# Patient Record
Sex: FEMALE | Race: WHITE | NOT HISPANIC OR LATINO | Employment: OTHER | URBAN - METROPOLITAN AREA
[De-identification: names, ages, dates, MRNs, and addresses within clinical notes are randomized per-mention and may not be internally consistent; named-entity substitution may affect disease eponyms.]

---

## 2017-12-09 ENCOUNTER — OFFICE VISIT - HEALTHEAST (OUTPATIENT)
Dept: FAMILY MEDICINE | Facility: CLINIC | Age: 67
End: 2017-12-09

## 2017-12-09 DIAGNOSIS — J01.90 ACUTE SINUSITIS: ICD-10-CM

## 2017-12-09 DIAGNOSIS — J20.9 ACUTE BRONCHITIS: ICD-10-CM

## 2017-12-20 ENCOUNTER — RECORDS - HEALTHEAST (OUTPATIENT)
Dept: ADMINISTRATIVE | Facility: OTHER | Age: 67
End: 2017-12-20

## 2018-11-08 ENCOUNTER — OFFICE VISIT - HEALTHEAST (OUTPATIENT)
Dept: FAMILY MEDICINE | Facility: CLINIC | Age: 68
End: 2018-11-08

## 2018-11-08 ENCOUNTER — RECORDS - HEALTHEAST (OUTPATIENT)
Dept: GENERAL RADIOLOGY | Facility: CLINIC | Age: 68
End: 2018-11-08

## 2018-11-08 DIAGNOSIS — J20.9 ACUTE BRONCHITIS, UNSPECIFIED: ICD-10-CM

## 2018-11-08 DIAGNOSIS — J32.9 SINUSITIS: ICD-10-CM

## 2018-11-08 DIAGNOSIS — J20.9 ACUTE BRONCHITIS: ICD-10-CM

## 2018-11-20 ENCOUNTER — AMBULATORY - HEALTHEAST (OUTPATIENT)
Dept: FAMILY MEDICINE | Facility: CLINIC | Age: 68
End: 2018-11-20

## 2018-11-20 ENCOUNTER — AMBULATORY - HEALTHEAST (OUTPATIENT)
Dept: NURSING | Facility: CLINIC | Age: 68
End: 2018-11-20

## 2018-11-20 DIAGNOSIS — Z23 NEED FOR VACCINATION: ICD-10-CM

## 2018-12-04 ENCOUNTER — OFFICE VISIT - HEALTHEAST (OUTPATIENT)
Dept: FAMILY MEDICINE | Facility: CLINIC | Age: 68
End: 2018-12-04

## 2018-12-04 DIAGNOSIS — R19.7 DIARRHEA, UNSPECIFIED TYPE: ICD-10-CM

## 2018-12-04 DIAGNOSIS — Z12.31 VISIT FOR SCREENING MAMMOGRAM: ICD-10-CM

## 2018-12-04 RX ORDER — CLOTRIMAZOLE AND BETAMETHASONE DIPROPIONATE 10; .64 MG/G; MG/G
CREAM TOPICAL
Qty: 15 G | Refills: 1 | Status: SHIPPED | OUTPATIENT
Start: 2018-12-04 | End: 2023-03-08

## 2018-12-05 ENCOUNTER — AMBULATORY - HEALTHEAST (OUTPATIENT)
Dept: LAB | Facility: CLINIC | Age: 68
End: 2018-12-05

## 2018-12-05 DIAGNOSIS — R19.7 DIARRHEA, UNSPECIFIED TYPE: ICD-10-CM

## 2018-12-05 LAB
C DIFF TOX B STL QL: POSITIVE
RIBOTYPE 027/NAP1/BI: ABNORMAL

## 2018-12-06 ENCOUNTER — COMMUNICATION - HEALTHEAST (OUTPATIENT)
Dept: FAMILY MEDICINE | Facility: CLINIC | Age: 68
End: 2018-12-06

## 2018-12-06 LAB
O+P STL MICRO: NORMAL
SHIGA TOXIN 1: NEGATIVE
SHIGA TOXIN 2: NEGATIVE

## 2018-12-08 LAB — BACTERIA SPEC CULT: NORMAL

## 2018-12-24 ENCOUNTER — HOSPITAL ENCOUNTER (OUTPATIENT)
Dept: MAMMOGRAPHY | Facility: CLINIC | Age: 68
Discharge: HOME | End: 2018-12-24
Attending: FAMILY MEDICINE

## 2018-12-24 DIAGNOSIS — Z12.31 VISIT FOR SCREENING MAMMOGRAM: ICD-10-CM

## 2019-03-04 ENCOUNTER — OFFICE VISIT - HEALTHEAST (OUTPATIENT)
Dept: FAMILY MEDICINE | Facility: CLINIC | Age: 69
End: 2019-03-04

## 2019-03-04 ENCOUNTER — HOSPITAL ENCOUNTER (OUTPATIENT)
Dept: LAB | Age: 69
Setting detail: SPECIMEN
Discharge: HOME | End: 2019-03-04

## 2019-03-04 DIAGNOSIS — Z01.818 PREOP GENERAL PHYSICAL EXAM: ICD-10-CM

## 2019-03-04 DIAGNOSIS — Z00.00 MEDICARE ANNUAL WELLNESS VISIT, INITIAL: ICD-10-CM

## 2019-03-04 DIAGNOSIS — Z80.3 FAMILY HISTORY OF BREAST CANCER: ICD-10-CM

## 2019-03-04 DIAGNOSIS — H26.9 CATARACT, UNSPECIFIED CATARACT TYPE, UNSPECIFIED LATERALITY: ICD-10-CM

## 2019-03-04 DIAGNOSIS — R73.01 IMPAIRED FASTING GLUCOSE: ICD-10-CM

## 2019-03-04 LAB
CHOLEST SERPL-MCNC: 231 MG/DL
FASTING STATUS PATIENT QL REPORTED: YES
FASTING STATUS PATIENT QL REPORTED: YES
GLUCOSE BLD-MCNC: 102 MG/DL (ref 70–125)
HBA1C MFR BLD: 5.6 % (ref 3.5–6)
HDLC SERPL-MCNC: 68 MG/DL
LDLC SERPL CALC-MCNC: 144 MG/DL
TRIGL SERPL-MCNC: 96 MG/DL

## 2019-03-04 ASSESSMENT — MIFFLIN-ST. JEOR: SCORE: 1340.08

## 2019-12-05 ENCOUNTER — AMBULATORY - HEALTHEAST (OUTPATIENT)
Dept: NURSING | Facility: CLINIC | Age: 69
End: 2019-12-05

## 2019-12-05 DIAGNOSIS — Z23 NEEDS FLU SHOT: ICD-10-CM

## 2021-05-31 VITALS — WEIGHT: 196 LBS | BODY MASS INDEX: 33.64 KG/M2

## 2021-06-02 VITALS — WEIGHT: 183 LBS | BODY MASS INDEX: 31.24 KG/M2 | HEIGHT: 64 IN

## 2021-06-02 VITALS — BODY MASS INDEX: 31.07 KG/M2 | WEIGHT: 181 LBS

## 2021-06-02 VITALS — WEIGHT: 187 LBS | BODY MASS INDEX: 32.1 KG/M2

## 2021-06-17 NOTE — PATIENT INSTRUCTIONS - HE
Patient Instructions by Sammie Pérez MD at 3/4/2019 12:50 PM     Author: Sammie Pérez MD Service: -- Author Type: Physician    Filed: 3/4/2019  1:20 PM Encounter Date: 3/4/2019 Status: Signed    : Sammie Pérez MD (Physician)         Patient Education   Understanding USDA MyPlate  The USDA (US Department of Agriculture) has guidelines to help you make healthy food choices. These are called MyPlate. MyPlate shows the food groups that make up healthy meals using the image of a place setting. Before you eat, think about the healthiest choices for what to put onto your plate or into your cup or bowl. To learn more about building a healthy plate, visit www.chooseCrossbordersplate.gov.       The Food Groups    Fruits: Any fruit or 100% fruit juice counts as part of the Fruit Group. Fruits may be fresh, canned, frozen, or dried, and may be whole, cut-up, or pureed. Make half your plate fruits and vegetables.    Vegetables: Any vegetable or 100% vegetable juice counts as a member of the Vegetable Group. Vegetables may be fresh, frozen, canned, or dried. They can be served raw or cooked and may be whole, cut-up, or mashed. Make half your plate fruits and vegetables.     Grains: All foods made from grains are part of the Grains Group. These include wheat, rice, oats, cornmeal, and barley such as bread, pasta, oatmeal, cereal, tortillas, and grits. Grains should be no more than a quarter of your plate. At least half of your grains should be whole grains.    Protein: This group includes meat, poultry, seafood, beans and peas, eggs, processed soy products (like tofu), nuts (including nut butters), and seeds. Make protein choices no more than a quarter of your plate. Meat and poultry choices should be lean or low fat.    Dairy: All fluid milk products and foods made from milk that contain calcium, like yogurt and cheese are part of the Dairy Group. (Foods that have little calcium, such as cream, butter, and cream  cheese, are not part of the group.) Most dairy choices should be low-fat or fat-free.    Oils: These are fats that are liquid at room temperature. They include canola, corn, olive, soybean, and sunflower oil. Foods that are mainly oil include mayonnaise, certain salad dressings, and soft margarines. You should have only 5 to 7 teaspoons of oils a day. You probably already get this much from the food you eat.  Use [employer] to Help Build Your Meals  The [employer] can help you plan and track your meals and activity. You can look up individual foods to see or compare their nutritional value. You can get guidelines for what and how much you should eat. You can compare your food choices. And you can assess personal physical activities and see ways you can improve. Go to www.wufoo.gov/[employer]/.    6043-8749 [employer]. [address] [place] [address]. All rights reserved. This information is not intended as a substitute for professional medical care. Always follow your healthcare professional's instructions.             Advance Directive  Patients advance directive was discussed and I am comfortable with the patients wishes.  Patient Education   Personalized Prevention Plan  You are due for the preventive services outlined below.  Your care team is available to assist you in scheduling these services.  If you have already completed any of these items, please share that information with your care team to update in your medical record.  Health Maintenance   Topic Date Due   ? ADVANCE DIRECTIVES DISCUSSED WITH PATIENT  07/28/1968   ? ZOSTER VACCINES (2 of 3) 04/09/2015   ? DXA SCAN  07/28/2016   ? PNEUMOCOCCAL POLYSACCHARIDE VACCINE AGE 65 AND OVER  07/28/2016   ? FALL RISK ASSESSMENT  12/04/2019   ? MAMMOGRAM  12/24/2020   ? TD 18+ HE  02/12/2025   ? COLONOSCOPY  04/14/2025   ? INFLUENZA VACCINE RULE BASED  Completed   ? PNEUMOCOCCAL CONJUGATE VACCINE FOR ADULTS (PCV13 OR PREVNAR)   Completed

## 2021-06-21 NOTE — PROGRESS NOTES
Assessment:     1. Acute bronchitis  XR Chest 2 Views    ipratropium-albuterol 0.5-2.5 mg/3 mL nebulizer solution 3 mL (DUO-NEB)    benzonatate (TESSALON) 200 MG capsule    albuterol (PROAIR HFA;PROVENTIL HFA;VENTOLIN HFA) 90 mcg/actuation inhaler   2. Sinusitis  amoxicillin-clavulanate (AUGMENTIN) 875-125 mg per tablet     Xr Chest 2 Views    Result Date: 11/8/2018  XR CHEST 2 VIEWS 11/8/2018 3:40 PM INDICATION: Cough x 2 weeks, r/o pneumonia. COMPARISON: None. FINDINGS: Negative chest. Subtle scoliosis.        Plan:     Differential diagnosis include but not limited to acute bronchitis, bacterial bronchitis, sinusitis.  X-ray was done to rule out pneumonia, negative chest x-ray, no infiltration noted.  I personally reviewed the x-ray.  Discussed with patient that we will treat her for sinusitis with Augmentin twice daily times 10 days.  For the acute bronchitis we will treat with DuoNeb in the clinic.  Lung sounds clear after the DuoNeb.  Will also use albuterol inhaler every 6 hours as needed for cough or wheezing.  Will also add Tessalon Perles to the treatment plan.  Patient is aware to take ibuprofen or Tylenol for pain or discomfort.  Monitor for worsening symptoms.  May follow-up with PCP if her symptoms does not resolve after treatment.  Patient verbalized understanding the plan of care.    Subjective:       68 y.o. female presents for evaluation of a cough times 2 weeks.  Patient reports that initially it was all in her chest now she feels like it seen in the sinus area.  She admits to feeling like her ears are stuffed and filled with pressure.  She admits to pain in the maxillary sinus area.  She has been using Robitussin 2-3 times a day for a few days which has not given her any symptom relief.  She is using an inhaler that she was prescribed last year for similar symptoms which gives her some minimal relief.  She denies a fever, no nausea, vomiting, diarrhea, shortness of breath.  She admits to recently  being exposed, her  is also here with similar symptoms.  She also has had recent air travel within the past 3 weeks.    The following portions of the patient's history were reviewed and updated as appropriate: allergies, current medications, past family history, past medical history, past social history, past surgical history and problem list.    Review of Systems  A 12 point comprehensive review of systems was negative except as noted.     Objective:      /84  Pulse 92  Temp 98  F (36.7  C) (Oral)   Resp 18  Wt 187 lb (84.8 kg)  SpO2 97%  BMI 32.1 kg/m2  General appearance: alert, appears stated age, cooperative and moderate distress  Head: Normocephalic, without obvious abnormality, atraumatic, sinuses tender to percussion  Eyes: conjunctivae/corneas clear. PERRL, EOM's intact. Fundi benign.  Ears: abnormal TM right ear - erythematous, bulging and air-fluid level and abnormal TM left ear - erythematous, bulging and air-fluid level  Nose: Nares normal. Septum midline. Mucosa normal. No drainage or sinus tenderness., moderate congestion, sinus tenderness bilateral, moderate maxillary sinus tenderness bilateral  Throat: abnormal findings: marked oropharyngeal erythema  Lungs: rhonchi bilaterally  Heart: regular rate and rhythm, S1, S2 normal, no murmur, click, rub or gallop  Extremities: extremities normal, atraumatic, no cyanosis or edema  Pulses: 2+ and symmetric  Skin: Skin color, texture, turgor normal. No rashes or lesions  Lymph nodes: Cervical, supraclavicular, and axillary nodes normal.  Neurologic: Grossly normal     This note has been dictated using voice recognition software. Any grammatical or context distortions are unintentional and inherent to the software

## 2021-06-22 NOTE — PROGRESS NOTES
ASSESSMENT/PLAN:  1. Diarrhea, unspecified type  Diarrhea with blood and mucus after taking a recent course of antibiotics.  She was also [facility] friend onto a history of C. difficile.  We will check C. difficile as well as stool cultures.  Recommend she avoid Imodium until we have the C. difficile back.  Encouraged her to start probiotics.  Further recommendations pending stool cultures.  - C. difficile Toxigenic by PCR; Future  - Culture, Stool; Future  - Ova and Parasite, Stool; Future    2. Visit for screening mammogram  - Mammo Screening Bilateral; Future      Patient Instructions   Start taking probiotics daily.     Complete the stool kits and return them to our clinic at your earliest convenience.    Avoid using Imodium until we get the stool cultures back.     Try to use nasal saline rinses and Flonase nasal spray to improve your sinus symptoms.       Orders Placed This Encounter   Procedures     C. difficile Toxigenic by PCR     Standing Status:   Future     Standing Expiration Date:   12/4/2019     Culture, Stool     Standing Status:   Future     Standing Expiration Date:   12/4/2019     Ova and Parasite, Stool     Standing Status:   Future     Standing Expiration Date:   12/4/2019     Mammo Screening Bilateral     Standing Status:   Future     Standing Expiration Date:   3/4/2020     Order Specific Question:   Patient's previous breast density:     Answer:   Scattered fibroglandular density [2]     Order Specific Question:   Can the procedure be changed per Radiologist protocol?     Answer:   Yes     Medications Discontinued During This Encounter   Medication Reason     albuterol (PROAIR HFA;PROVENTIL HFA;VENTOLIN HFA) 90 mcg/actuation inhaler Therapy completed     calcium carbonate-vitamin D2 500 mg(1,250mg) -200 unit tablet Therapy completed       Return in about 3 months (around 3/4/2019) for annual physical with PCP.    CHIEF COMPLAINT;  Chief Complaint   Patient presents with     Diarrhea      since start of abx, check ears and spot on leg       HISTORY OF PRESENT ILLNESS:  Nia is a 68 y.o. female presenting to the clinic today for diarrhea. She was in [place] three times in a 2 month time period. Her most recent trip was in October and she did return 10/25/18. Her  notes that they got pretty ill after returning from [place]. Her and her  both had acute bronchitis about 10-14 days after returning from their trip. She was put on amoxicillin, tessalon Perles, and an inhaler. She has previously taken amoxicillin and has not had any issues with it. She developed diarrhea 3 days after starting the antibiotics. She is going to the bathroom upwards of 8 times per day. She endorses cramping and chills when she has a bowel movement. She did try Imodium last week for 2 days, which did help improve the diarrhea, however the diarrhea did return. She denies nausea, vomiting, or hematochezia. She does note that she will pass mucous after she has had 7-8 episodes of diarrhea in one day. When she does pass mucous, there will be a little blood tinge present. She is trying to stay hydrated. She notes that whenever she eats something, she will have diarrhea almost immediately. She did visit a nursing home while she was in [place].     REVIEW OF SYSTEMS:  HEENT positive for sinus pressure. Skin is positive for erythematous area on upper right leg. All other systems are negative.    PFSH:  Her aunt passed away recently. Reviewed, as below.    TOBACCO USE:  Social History     Tobacco Use   Smoking Status Never Smoker   Smokeless Tobacco Never Used       VITALS:  Vitals:    12/04/18 1333   BP: 110/78   Pulse: 72   Resp: 20   Temp: 98  F (36.7  C)   Weight: 181 lb (82.1 kg)     Wt Readings from Last 3 Encounters:   12/04/18 181 lb (82.1 kg)   11/08/18 187 lb (84.8 kg)   12/09/17 196 lb (88.9 kg)     Body mass index is 31.07 kg/m .    PHYSICAL EXAM:  GENERAL APPEARANCE: Alert, cooperative, no  distress, appears stated age  HEAD: Normocephalic, without obvious abnormality, atraumatic  LUNGS: Clear to auscultation bilaterally, respirations unlabored  HEART: Regular rate and rhythm, S1 and S2 normal, no murmur, rub or gallop  ABDOMEN: Soft, non-tender, bowel sounds active all four quadrants,     no masses, no organomegaly  SKIN: 2 cm round scaling erythematous lesion with raised edges on mid right upper thigh  NEUROLOGIC: CNII-XII intact.     RECENT RESULTS  No results found for this or any previous visit (from the past 48 hour(s)).    ADDITIONAL HISTORY SUMMARIZED (2): None.  DECISION TO OBTAIN EXTRA INFORMATION (1): None.  RADIOLOGY TESTS (1): Mammogram ordered.  LABS (1): Labs ordered today.  MEDICINE TESTS (1): None.  INDEPENDENT REVIEW (2 each): None.    The visit lasted a total of 17 minutes face to face with the patient. Over 50% of the time was spent counseling and educating the patient about diarrhea.    ICass, am scribing for and in the presence of, Dr. Pabon.    I, Dr. Pabon, personally performed the services described in this documentation, as scribed by Cass Liu in my presence, and it is both accurate and complete.    Dragon dictation was used for this note.  Speech recognition errors are a possibility.    MEDICATIONS:  Current Outpatient Medications   Medication Sig Dispense Refill     clotrimazole-betamethasone (LOTRISONE) cream Apply to affected area 3 times daily. 15 g 1     No current facility-administered medications for this visit.        Total data points: 2

## 2021-06-24 NOTE — PROGRESS NOTES
Assessment and Plan:     1. Medicare annual wellness visit, initial  At today's visit, we discussed lifestyle interventions to promote self-management and wellness, including maintenance of a healthy weight, healthy diet, regular physical activity and exercise, and falls prevention.  We will obtain fasting lipids today.  Up-to-date with routine cancer screening, recommend mammogram yearly.  Encouraged her to forward as her advanced healthcare directive.  She will be due for PPS V 23 in November.  Consider Shingrix.  - Lipid Otoe FASTING    2. Impaired fasting glucose  Encourage healthy lifestyle habits.  We will check fasting glucose and fasting A1c today.  - Glucose  - Glycosylated Hemoglobin A1c    3. Family history of breast cancer  She would like to consider seeing a genetic counselor for consideration of BRCA gene testing.  She will let me know if she desires referral.    4. Preop general physical exam  No significant surgical cultures or contraindications identified.  She may proceed with cataract surgery as scheduled without further clinical clarification or evaluation and may proceed with choice of anesthesia.    5. Cataract, unspecified cataract type, unspecified laterality  Proceed with surgical treatment.    The patient's current medical problems were reviewed.    The following health maintenance schedule was reviewed with the patient and provided in printed form in the after visit summary:   Health Maintenance   Topic Date Due     ADVANCE DIRECTIVES DISCUSSED WITH PATIENT  07/28/1969     ZOSTER VACCINES (2 of 3) 04/09/2015     DXA SCAN  07/28/2016     PNEUMOCOCCAL POLYSACCHARIDE VACCINE AGE 65 AND OVER  07/28/2016     FALL RISK ASSESSMENT  12/04/2019     MAMMOGRAM  12/24/2020     TD 18+ HE  02/12/2025     COLONOSCOPY  04/14/2025     INFLUENZA VACCINE RULE BASED  Completed     PNEUMOCOCCAL CONJUGATE VACCINE FOR ADULTS (PCV13 OR PREVNAR)  Completed        Subjective:   Chief Complaint: Nia REEVES  Conner is an 68 y.o. female here for an Annual Wellness visit.   HPI: She is been doing quite well.  She will be needing cataract surgery later this month.  She is fasting today for follow-up of impaired fasting glucose.  She has been struggling to exercise this winter due to the weather and fear of falling when exercising outside by walking.  Overall feeling she is doing okay with diet but needing to increase her fruits and vegetables.  Unfortunately struggled with bronchitis earlier in the fall, followed by C. difficile colitis.  That has resolved.  Has had some intermittent ear plugging since the bronchitis, waxing and waning.  No family history of breast cancer in her paternal grandmother in her late 60s or 70s and recently in paternal aunt in her 90s.  Patient is wondering if she could have a breast cancer gene and therefore may qualify for increased surveillance or potentially even prophylactic mastectomy, interested in potentially pursuing this further.     Patient has had worsening of her cataract, requiring surgical treatment.  She has no previous history of anesthesia reactions or bleeding or clotting disorders and has no family history of same.  She would be agreeable to blood transfusion should one be required the surgery is incredibly real low risk for this.    Review of Systems:    Please see above.  The rest of the review of systems are negative for all systems.    Patient Care Team:  Sammie Pérez MD as PCP - General (Family Medicine)     Patient Active Problem List   Diagnosis     Impaired fasting glucose     Cataract     No past medical history on file.   No past surgical history on file.   Family History   Problem Relation Age of Onset     Breast cancer Paternal Grandmother      Cancer Mother      Kidney cancer Mother      Hypertension Father      Anxiety disorder Sister      Anxiety disorder Brother       Social History     Socioeconomic History     Marital status:      Spouse name:  "Not on file     Number of children: Not on file     Years of education: Not on file     Highest education level: Not on file   Occupational History     Not on file   Social Needs     Financial resource strain: Not on file     Food insecurity:     Worry: Not on file     Inability: Not on file     Transportation needs:     Medical: Not on file     Non-medical: Not on file   Tobacco Use     Smoking status: Never Smoker     Smokeless tobacco: Never Used   Substance and Sexual Activity     Alcohol use: Not on file     Drug use: Not on file     Sexual activity: Not on file   Lifestyle     Physical activity:     Days per week: Not on file     Minutes per session: Not on file     Stress: Not on file   Relationships     Social connections:     Talks on phone: Not on file     Gets together: Not on file     Attends Buddhism service: Not on file     Active member of club or organization: Not on file     Attends meetings of clubs or organizations: Not on file     Relationship status: Not on file     Intimate partner violence:     Fear of current or ex partner: Not on file     Emotionally abused: Not on file     Physically abused: Not on file     Forced sexual activity: Not on file   Other Topics Concern     Not on file   Social History Narrative     Not on file      Current Outpatient Medications   Medication Sig Dispense Refill     clotrimazole-betamethasone (LOTRISONE) cream Apply to affected area 3 times daily. 15 g 1     L. rhamnosus GG/inulin (CULTURELLE PROBIOTICS ORAL) Take by mouth.       No current facility-administered medications for this visit.       Objective:   Vital Signs:   Visit Vitals  /84   Pulse 88   Temp 97.9  F (36.6  C) (Oral)   Resp 20   Ht 5' 4\" (1.626 m)   Wt 183 lb (83 kg)   LMP 12/24/1999   SpO2 99%   BMI 31.41 kg/m         VisionScreening:  No exam data present     PHYSICAL EXAM  Physical Examination: General appearance - alert, well appearing, and in no distress, oriented to person, place, " and time and normal appearing weight  Mental status - alert, oriented to person, place, and time, normal mood, behavior, speech, dress, motor activity, and thought processes  Eyes - pupils equal and reactive, extraocular eye movements intact  Ears - bilateral TM's and external ear canals normal  Nose - normal and patent, no erythema, discharge or polyps  Mouth - mucous membranes moist, pharynx normal without lesions  Neck - supple, no significant adenopathy  Lymphatics - no palpable lymphadenopathy, no hepatosplenomegaly  Chest - clear to auscultation, no wheezes, rales or rhonchi, symmetric air entry  Heart - normal rate, regular rhythm, normal S1, S2, no murmurs, rubs, clicks or gallops  Abdomen - soft, nontender, nondistended, no masses or organomegaly  Breasts - breasts appear normal, no suspicious masses, no skin or nipple changes or axillary nodes  Neurological - alert, oriented, normal speech, no focal findings or movement disorder noted  Musculoskeletal - no joint tenderness, deformity or swelling  Extremities - peripheral pulses normal, no pedal edema, no clubbing or cyanosis  Skin - normal coloration and turgor, no rashes, no suspicious skin lesions noted      Assessment Results 3/4/2019   Activities of Daily Living No help needed   Instrumental Activities of Daily Living No help needed   Mini Cog Total Score 5   Some recent data might be hidden     A Mini-Cog score of 0-2 suggests the possibility of dementia, score of 3-5 suggests no dementia    Identified Health Risks:     The patient was counseled and encouraged to consider modifying their diet and eating habits. She was provided with information on recommended healthy diet options.  Patient's advanced directive was discussed and I am comfortable with the patient's wishes.

## 2021-06-24 NOTE — PROGRESS NOTES
Your fasting blood sugar is elevated into the prediabetes range.  This means that your body is beginning to have difficulty in managing sugar, but you do not yet have diabetes.  Your A1c, which measures your fasting blood sugar over the last 3 months, remains in the normal range.  Regular exercise, healthy eating (lots of fresh veggies and fruits, more lean meats and protein, and fewer sweets, baked goods, and bread products), and maintenance of a healthy weight can improve your blood sugar and reduce your risk of developing diabetes in the future.  We will follow these tests yearly.

## 2021-06-25 NOTE — PROGRESS NOTES
Progress Notes by Lin Li MD at 12/9/2017  1:50 PM     Author: Lin Li MD Service: -- Author Type: Physician    Filed: 12/9/2017  2:53 PM Encounter Date: 12/9/2017 Status: Signed    : Lin Li MD (Physician)       Provider wore a mask during this visit.   Subjective:   Nia Prieto is a 67 y.o. female  Roomed by: Heather S    Accompanied by Spouse    Refills needed? No    Do you have any forms that need to be filled out? No      Chief Complaint   Patient presents with   ? Cough     Cold/cough x 12 days    Started out in the chest and has moved to the nose and ears. Denies recent fever, CP, SOB. But admits a little frontal headache and sense of smell is down. Admits fatigue, and appetite has been down. Denies nausea, vomiting, diarrhea or belly pain. Admits congestion in her chest. Has tried Robittussin, Airborne, and advil. Says her breathing feels tight.   Review of Systems  Const - Resp - see HPI  No Known Allergies    Current Outpatient Prescriptions:   ?  calcium carbonate-vitamin D2 500 mg(1,250mg) -200 unit tablet, Take 1 tablet by mouth 2 (two) times a day., Disp: , Rfl:   Patient Active Problem List   Diagnosis   ? Impaired fasting glucose   ? Cataract     Medical History Reviewed  Objective:     Vitals:    12/09/17 1356   BP: 128/80   Patient Site: Right Arm   Patient Position: Sitting   Cuff Size: Adult Regular   Pulse: 90   Resp: 18   Temp: 98  F (36.7  C)   TempSrc: Oral   SpO2: 96%   Weight: 196 lb (88.9 kg)   Gen - Pt in NAD  Eyes - Conjunctiva non injected, no drainage  Face - non TTP over frontal sinus areas; non TTP over maxillary area  Ears - external canals - no induration, Right TM - not injected, Left TM - not injected   Nose - not congested, no nasal drainage  Pharynx - non injected, tonsils 1+ size  Neck - supple, no cervical adenopathy, no masses  Cor - RRR w/o murmur  Lungs - Fair -good air entry; diffuse expiratory wheezes, but no crackles noted on  auscultation - coarse  coughing noted on request  Skin - no lesions, no rashes noted     Assessment - Plan   Medical Decision Making -67-year-old woman presents with 12 days of URI symptoms that have gotten not improved.  Also admits some shortness of breath and chest slight chest tightness.  On exam patient did have some diffuse expiratory wheezes and coarse coughing.  After the albuterol nebulizer she said she could breathe a little better.  We will treat for an acute sinusitis because of patient's prolonged symptoms of a URI and albuterol inhaler with spacer for acute bronchitis.    1. Acute sinusitis  - amoxicillin-clavulanate (AUGMENTIN) 875-125 mg per tablet; Take 1 tablet by mouth 2 (two) times a day for 7 days.  Dispense: 14 tablet; Refill: 0    2. Acute bronchitis  - albuterol nebulizer solution 2.5 mg (PROVENTIL); Take 3 mL (2.5 mg total) by nebulization once.  - albuterol (PROAIR HFA;PROVENTIL HFA;VENTOLIN HFA) 90 mcg/actuation inhaler; Inhale 1-2 puffs every 4 (four) hours as needed for wheezing or shortness of breath (or coughing).  Dispense: 1 Inhaler; Refill: 0  - Spacer W/O Mask    At the conclusion of the encounter, assessment and plan were discussed.   All questions were answered.   The patient or guardian acknowledged understanding and was involved in the decision making regarding the overall care plan.    Patient Instructions     1. Keep well hydrated  2.  May alternate Tylenol every 6 hours with ibuprofen every 6 hours as needed for fever or pain  3. If symptoms not improved after completing antibiotics, follow up with primary  4. If are getting worse after 48 hours of antibiotics or you have any questions, call the clinic number - it's answered 24/7      Acute Bacterial Rhinosinusitis (ABRS)  Acute bacterial rhinosinusitis (ABRS) is an infection of your nasal cavity and sinuses. Its caused by bacteria. Acute means that youve had symptoms for less than 12 weeks.  Understanding your sinuses  The  nasal cavity is the large air-filled space behind your nose. The sinuses are a group of spaces formed by the bones of your face. They connect with your nasal cavity. ABRS causes the tissue lining these spaces to become inflamed. Mucus may not drain normally. This leads to facial pain and other symptoms.  What causes ABRS?  ABRS most often follows an upper respiratory infection caused by a virus. Bacteria then infect the lining of your nasal cavity and sinuses. But you can also get ABRS if you have:    Nasal allergies    Long-term nasal swelling and congestion not caused by allergies    Blockage in the nose  Symptoms of ABRS  The symptoms of ABRS may be different for each person, and can include:    Nasal congestion    Runny nose    Fluid draining from the nose down the throat (postnasal drip)    Headache    Cough    Pain in the sinuses    Thick, colored fluid from the nose (mucus)    Fever  Diagnosing ABRS  ABRS may be diagnosed if youve had an upper respiratory infection like a cold and cough for longer than 10 to 14 days. Your health care provider will ask about your symptoms and your medical history. The provider will check your vital signs, including your temperature. Youll have a physical exam. The health care provider will check your ears, nose, and throat. You likely wont need any tests. If ABRS comes back, you may have a culture or other tests.  Treatment for ABRS  Treatment may include:    Antibiotic medicine. This is for symptoms that last for at least 10 to 14 days.    Nasal corticosteroid medicine. Drops or spray used in the nose can lessen swelling and congestion.    Over-the-counter pain medicine. This is to lessen sinus pain and pressure.    Nasal decongestant medicine. Spray or drops may help to lessen congestion. Do not use them for more than a few days.    Salt wash (saline irrigation). This can help to loosen mucus.  Possible complications of ABRS  ABRS may come back or become long-term  (chronic).  In rare cases, ABRS may cause complications such as:     Inflamed tissue around the brain and spinal cord (meningitis)    Inflamed tissue around the eyes (orbital cellulitis)    Inflamed bones around the sinuses (osteitis)  These problems may need to be treated in a hospital with intravenous (IV) antibiotic medicine or surgery.  When to call the health care provider  Call your health care provider if you have any of the following:    Symptoms that dont get better, or get worse    Symptoms that dont get better after 3 to 5 days on antibiotics    Trouble seeing    Swelling around your eyes    Confusion or trouble staying awake   Date Last Reviewed: 3/3/2015    1975-6263 "[employer]. [address] [place] [address], [place] [address]. All rights reserved. This information is not intended as a substitute for professional medical care. Always follow your healthcare professional's instructions.      What Is Acute Bronchitis?  Acute or short-term bronchitis last for days or weeks. It occurs when the bronchial tubes (airways in the lungs) are irritated by a virus, bacteria, or allergen. This causes a cough that produces yellow or greenish mucus.  Inside healthy lungs    Air travels in and out of the lungs through the airways. The linings of these airways produce sticky mucus. This mucus traps particles that enter the lungs. Tiny structures called cilia then sweep the particles out of the airways.     Healthy airway: Airways are normally open. Air moves in and out easily.      Healthy cilia: Tiny, hairlike cilia sweep mucus and particles up and out of the airways.   Lungs with bronchitis  Bronchitis often occurs with a cold or the flu virus. The airways become inflamed (red and swollen). There is a deep hacking cough from the extra mucus. Other symptoms may include:    Wheezing    Chest discomfort    Shortness of breath    Mild fever  A second infection, this time due to bacteria, may then occur. And airways  irritated by allergens or smoke are more likely to get infected.        Inflamed airway: Inflammation and extra mucus narrow the airway, causing shortness of breath.      Impaired cilia: Extra mucus impairs cilia, causing congestion and wheezing. Smoking makes the problem worse.   Making a diagnosis  A physical exam, health history, and certain tests help your healthcare provider make the diagnosis.  Health history  Your healthcare provider will ask you about your symptoms.  The exam  Your provider listens to your chest for signs of congestion. He or she may also check your ears, nose, and throat.  Possible tests    A sputum test for bacteria. This requires a sample of mucus from the lungs.    A nasal or throat swab for bacterial infection.    A chest X-ray if your healthcare provider thinks you have pneumonia.    Tests to check for an underlying condition, such as allergies, asthma, or COPD. You may need to see a specialist for more lung function testing.  Treating a cough  The main treatment for bronchitis is easing symptoms. Avoiding smoke, allergens, and other things that trigger coughing can often help. If the infection is bacterial, you may be given antibiotics. During the illness, it's important to get plenty of sleep. To ease symptoms:    Dont smoke, and avoid secondhand smoke.    Use a humidifier, or breathe in steam from a hot shower. This may help loosen mucus.    Drink a lot of water and juice. They can soothe the throat and may help thin mucus.    Sit up or use extra pillows when in bed to help lessen coughing and congestion.    Ask your provider about using cough medicine, pain and fever medicine, or a decongestant.  Antibiotics  Most cases of bronchitis are caused by cold or flu viruses. Antibiotics dont treat viral illness. Taking antibiotics when they are not needed increases your risk of getting an infection later that is antibiotic-resistant. Your provider will prescribe antibiotics if the  infection is caused by bacteria. If they are prescribed:    Take the medicine until it is used up, even if symptoms have improved. If you dont, the bronchitis may come back.    Take them as directed. For instance, some medicines should be taken with food.    Ask your provider or pharmacist what side effects are common, and what to do about them.  Follow-up care  You should see your provider again in 2 to 3 weeks. By this time, symptoms should have improved. An infection that lasts longer may mean you have a more serious problem.  Prevention    Avoid tobacco smoke. If you smoke, quit. Stay away from smoky places. Ask friends and family not to smoke around you, or in your home or car.    Get checked for allergies.    Ask your provider about getting a yearly flu shot, and pneumococcal or pneumonia shots.    Wash your hands often. This helps reduce the chance of picking up viruses that cause colds and flu.  Call your healthcare provider if:    Symptoms worsen, or new symptoms develop.    Breathing problems worsen or  become severe.    Symptoms dont get better within a week, or within 3 days of taking antibiotics.   Date Last Reviewed: 6/18/2014 2000-2016 [employer] [address] [place] [address], [place] [address]. All rights reserved. This information is not intended as a substitute for professional medical care. Always follow your healthcare professional's instructions.

## 2021-06-26 ENCOUNTER — HEALTH MAINTENANCE LETTER (OUTPATIENT)
Age: 71
End: 2021-06-26

## 2021-10-16 ENCOUNTER — HEALTH MAINTENANCE LETTER (OUTPATIENT)
Age: 71
End: 2021-10-16

## 2022-07-17 ENCOUNTER — HEALTH MAINTENANCE LETTER (OUTPATIENT)
Age: 72
End: 2022-07-17

## 2022-09-25 ENCOUNTER — HEALTH MAINTENANCE LETTER (OUTPATIENT)
Age: 72
End: 2022-09-25

## 2023-01-17 ENCOUNTER — OFFICE VISIT (OUTPATIENT)
Dept: FAMILY MEDICINE | Facility: CLINIC | Age: 73
End: 2023-01-17
Payer: MEDICARE

## 2023-01-17 VITALS
BODY MASS INDEX: 29.52 KG/M2 | WEIGHT: 172 LBS | HEART RATE: 98 BPM | OXYGEN SATURATION: 95 % | DIASTOLIC BLOOD PRESSURE: 100 MMHG | RESPIRATION RATE: 16 BRPM | SYSTOLIC BLOOD PRESSURE: 148 MMHG | TEMPERATURE: 97.3 F

## 2023-01-17 DIAGNOSIS — L85.3 XEROSIS CUTIS: Primary | ICD-10-CM

## 2023-01-17 PROCEDURE — 99203 OFFICE O/P NEW LOW 30 MIN: CPT | Performed by: PHYSICIAN ASSISTANT

## 2023-01-17 RX ORDER — TRIAMCINOLONE ACETONIDE 1 MG/G
CREAM TOPICAL 2 TIMES DAILY
Qty: 45 G | Refills: 1 | Status: SHIPPED | OUTPATIENT
Start: 2023-01-17 | End: 2023-01-31

## 2023-01-17 NOTE — PROGRESS NOTES
Assessment & Plan:      Problem List Items Addressed This Visit    None  Visit Diagnoses     Xerosis cutis    -  Primary    Relevant Medications    triamcinolone (KENALOG) 0.1 % external cream        Medical Decision Making  Patient presents with on and off itchy rash of the waist, arms, and hands bilaterally for 2 weeks.  Symptoms appear consistent with dry skin.  No signs of cellulitis, tinea corporis, or allergic reaction.  Discussed treatment and symptomatic care.  Allergies and medication interactions reviewed.  Discussed signs of worsening symptoms and when to follow-up with PCP if no symptom improvement.     Subjective:      Nia Prieto is a 72 year old female here for evaluation of dry, itchy skin.  Onset of symptoms was 2 weeks ago.  Patient was recently treated with oral amoxicillin for a dental procedure.  Last dose of antibiotics was 10 days ago.  Symptoms started 2 days after patient completed her antibiotics.  Rash initially developed on the lower posterior waist bilaterally.  Patient has also had rash on the hands and arms bilaterally.  Itchiness will come and go.  Itchiness does seem to subside slightly after applying topical moisturizer.  No fevers.  No new soaps, lotions, or detergents.     The following portions of the patient's history were reviewed and updated as appropriate: allergies, current medications, and problem list.     Review of Systems  Pertinent items are noted in HPI.    Allergies  No Known Allergies    Family History   Problem Relation Age of Onset     Breast Cancer Paternal Grandmother      Cancer Mother      Kidney Cancer Mother      Hypertension Father      Anxiety Disorder Sister      Anxiety Disorder Brother        Social History     Tobacco Use     Smoking status: Never     Smokeless tobacco: Never   Substance Use Topics     Alcohol use: Not on file        Objective:      BP (!) 148/100   Pulse 98   Temp 97.3  F (36.3  C) (Oral)   Resp 16   Wt 78 kg (172 lb)    SpO2 95%   BMI 29.52 kg/m    General appearance - alert, well appearing, and in no distress and non-toxic  Skin - Dry/scaled erythematous tissue affecting the posterior arms, posterior waist, and hands bilaterally    The use of Dragon/[employer] dictation services was used to construct the content of this note; any grammatical errors are non-intentional. Please contact the author directly if you are in need of any clarification.

## 2023-03-07 ASSESSMENT — ENCOUNTER SYMPTOMS
BREAST MASS: 0
CONSTIPATION: 0
COUGH: 0
NAUSEA: 0
HEADACHES: 0
JOINT SWELLING: 0
PARESTHESIAS: 0
SHORTNESS OF BREATH: 0
FEVER: 0
ARTHRALGIAS: 0
ABDOMINAL PAIN: 0
FREQUENCY: 0
NERVOUS/ANXIOUS: 0
PALPITATIONS: 0
MYALGIAS: 0
HEARTBURN: 1
WEAKNESS: 0
CHILLS: 0
DYSURIA: 0
DIARRHEA: 0
DIZZINESS: 0
EYE PAIN: 0
HEMATURIA: 0
SORE THROAT: 0
HEMATOCHEZIA: 0

## 2023-03-07 ASSESSMENT — ACTIVITIES OF DAILY LIVING (ADL): CURRENT_FUNCTION: NO ASSISTANCE NEEDED

## 2023-03-09 ENCOUNTER — OFFICE VISIT (OUTPATIENT)
Dept: FAMILY MEDICINE | Facility: CLINIC | Age: 73
End: 2023-03-09
Payer: MEDICARE

## 2023-03-09 VITALS
DIASTOLIC BLOOD PRESSURE: 94 MMHG | BODY MASS INDEX: 29.16 KG/M2 | HEART RATE: 90 BPM | WEIGHT: 170.8 LBS | HEIGHT: 64 IN | SYSTOLIC BLOOD PRESSURE: 140 MMHG | TEMPERATURE: 97.2 F | RESPIRATION RATE: 16 BRPM | OXYGEN SATURATION: 99 %

## 2023-03-09 DIAGNOSIS — Z11.59 NEED FOR HEPATITIS C SCREENING TEST: ICD-10-CM

## 2023-03-09 DIAGNOSIS — Z00.00 MEDICARE ANNUAL WELLNESS VISIT, SUBSEQUENT: Primary | ICD-10-CM

## 2023-03-09 DIAGNOSIS — L50.3 DERMATOGRAPHIA: ICD-10-CM

## 2023-03-09 DIAGNOSIS — R73.01 IMPAIRED FASTING GLUCOSE: ICD-10-CM

## 2023-03-09 DIAGNOSIS — Z78.0 ASYMPTOMATIC POSTMENOPAUSAL STATUS: ICD-10-CM

## 2023-03-09 DIAGNOSIS — L29.9 ITCHING: ICD-10-CM

## 2023-03-09 DIAGNOSIS — Z13.220 LIPID SCREENING: ICD-10-CM

## 2023-03-09 LAB
ALBUMIN SERPL BCG-MCNC: 4.6 G/DL (ref 3.5–5.2)
ALP SERPL-CCNC: 59 U/L (ref 35–104)
ALT SERPL W P-5'-P-CCNC: 7 U/L (ref 10–35)
ANION GAP SERPL CALCULATED.3IONS-SCNC: 14 MMOL/L (ref 7–15)
AST SERPL W P-5'-P-CCNC: 21 U/L (ref 10–35)
BASOPHILS # BLD AUTO: 0 10E3/UL (ref 0–0.2)
BASOPHILS NFR BLD AUTO: 0 %
BILIRUB SERPL-MCNC: 0.3 MG/DL
BUN SERPL-MCNC: 12.3 MG/DL (ref 8–23)
CALCIUM SERPL-MCNC: 9.9 MG/DL (ref 8.8–10.2)
CHLORIDE SERPL-SCNC: 103 MMOL/L (ref 98–107)
CHOLEST SERPL-MCNC: 217 MG/DL
CREAT SERPL-MCNC: 0.81 MG/DL (ref 0.51–0.95)
DEPRECATED HCO3 PLAS-SCNC: 24 MMOL/L (ref 22–29)
EOSINOPHIL # BLD AUTO: 0.1 10E3/UL (ref 0–0.7)
EOSINOPHIL NFR BLD AUTO: 1 %
ERYTHROCYTE [DISTWIDTH] IN BLOOD BY AUTOMATED COUNT: 13.4 % (ref 10–15)
GFR SERPL CREATININE-BSD FRML MDRD: 77 ML/MIN/1.73M2
GLUCOSE SERPL-MCNC: 112 MG/DL (ref 70–99)
HBA1C MFR BLD: 5.5 % (ref 0–5.6)
HCT VFR BLD AUTO: 41.3 % (ref 35–47)
HDLC SERPL-MCNC: 72 MG/DL
HGB BLD-MCNC: 13.9 G/DL (ref 11.7–15.7)
IMM GRANULOCYTES # BLD: 0 10E3/UL
IMM GRANULOCYTES NFR BLD: 0 %
LDLC SERPL CALC-MCNC: 129 MG/DL
LYMPHOCYTES # BLD AUTO: 1.2 10E3/UL (ref 0.8–5.3)
LYMPHOCYTES NFR BLD AUTO: 20 %
MCH RBC QN AUTO: 29.8 PG (ref 26.5–33)
MCHC RBC AUTO-ENTMCNC: 33.7 G/DL (ref 31.5–36.5)
MCV RBC AUTO: 88 FL (ref 78–100)
MONOCYTES # BLD AUTO: 0.4 10E3/UL (ref 0–1.3)
MONOCYTES NFR BLD AUTO: 7 %
NEUTROPHILS # BLD AUTO: 4.5 10E3/UL (ref 1.6–8.3)
NEUTROPHILS NFR BLD AUTO: 72 %
NONHDLC SERPL-MCNC: 145 MG/DL
PLATELET # BLD AUTO: 282 10E3/UL (ref 150–450)
POTASSIUM SERPL-SCNC: 5 MMOL/L (ref 3.4–5.3)
PROT SERPL-MCNC: 7.6 G/DL (ref 6.4–8.3)
RBC # BLD AUTO: 4.67 10E6/UL (ref 3.8–5.2)
SODIUM SERPL-SCNC: 141 MMOL/L (ref 136–145)
TRIGL SERPL-MCNC: 78 MG/DL
WBC # BLD AUTO: 6.3 10E3/UL (ref 4–11)

## 2023-03-09 PROCEDURE — G0439 PPPS, SUBSEQ VISIT: HCPCS | Performed by: FAMILY MEDICINE

## 2023-03-09 PROCEDURE — 83036 HEMOGLOBIN GLYCOSYLATED A1C: CPT | Performed by: FAMILY MEDICINE

## 2023-03-09 PROCEDURE — 36415 COLL VENOUS BLD VENIPUNCTURE: CPT | Performed by: FAMILY MEDICINE

## 2023-03-09 PROCEDURE — 80053 COMPREHEN METABOLIC PANEL: CPT | Performed by: FAMILY MEDICINE

## 2023-03-09 PROCEDURE — 86803 HEPATITIS C AB TEST: CPT | Performed by: FAMILY MEDICINE

## 2023-03-09 PROCEDURE — 99213 OFFICE O/P EST LOW 20 MIN: CPT | Mod: 25 | Performed by: FAMILY MEDICINE

## 2023-03-09 PROCEDURE — 80061 LIPID PANEL: CPT | Performed by: FAMILY MEDICINE

## 2023-03-09 PROCEDURE — 85025 COMPLETE CBC W/AUTO DIFF WBC: CPT | Performed by: FAMILY MEDICINE

## 2023-03-09 ASSESSMENT — ENCOUNTER SYMPTOMS
CONSTIPATION: 0
HEADACHES: 0
DIARRHEA: 0
COUGH: 0
BREAST MASS: 0
DIZZINESS: 0
SHORTNESS OF BREATH: 0
SORE THROAT: 0
FEVER: 0
HEARTBURN: 1
ABDOMINAL PAIN: 0
PALPITATIONS: 0
DYSURIA: 0
WEAKNESS: 0
FREQUENCY: 0
NERVOUS/ANXIOUS: 0
MYALGIAS: 0
CHILLS: 0
HEMATURIA: 0
ARTHRALGIAS: 0
PARESTHESIAS: 0
JOINT SWELLING: 0
EYE PAIN: 0
HEMATOCHEZIA: 0
NAUSEA: 0

## 2023-03-09 ASSESSMENT — PAIN SCALES - GENERAL: PAINLEVEL: NO PAIN (0)

## 2023-03-09 ASSESSMENT — ACTIVITIES OF DAILY LIVING (ADL): CURRENT_FUNCTION: NO ASSISTANCE NEEDED

## 2023-03-09 NOTE — PATIENT INSTRUCTIONS
Begin cetirizine (Zyrtec) 10 mg daily to help with skin itching and dermatographia some.  Continue to use a good lotion regularly.  Check your water softener to ensure it is functioning properly.  Let us know if you are having new symptoms or things are not improving.  Zyrtec may also help with the postnasal drainage you are experiencing.    I do not feel you need further evaluation of the episode of syncope (fainting) that you experienced in September, but be sure to let us know if you develop additional symptoms.    I recommend that you receive the pneumonia shot, PCV 20.  Check your insurance coverage as this is often best covered at a pharmacy.      Patient Education   Personalized Prevention Plan  You are due for the preventive services outlined below.  Your care team is available to assist you in scheduling these services.  If you have already completed any of these items, please share that information with your care team to update in your medical record.  Health Maintenance Due   Topic Date Due    Osteoporosis Screening  Never done    ANNUAL REVIEW OF HM ORDERS  Never done    Hepatitis C Screening  Never done    Pneumococcal Vaccine (2 - PPSV23 if available, else PCV20) 11/20/2019    Annual Wellness Visit  03/04/2020    Mammogram  12/24/2020    Zoster (Shingles) Vaccine (3 of 3) 11/01/2022

## 2023-03-09 NOTE — PROGRESS NOTES
"SUBJECTIVE:   Nia is a 72 year old who presents for Preventive Visit.    Patient has been advised of split billing requirements and indicates understanding: Yes  Are you in the first 12 months of your Medicare coverage?  No    Here with her  José Miguel.  Bothered by significantly itchy skin this winter, seen in urgent care and diagnosed with dry skin.  She is using good lotion, using a humidifier, does not seem to be helping a whole lot.  Triamcinolone was not helpful.  Also noting some postnasal drainage in the back of her throat that sometimes will cause gurgling in her ears.  Notes in September she was at a restaurant, it was very hot, she had an alcoholic beverage which is rare for her, suddenly felt lightheaded, passed out briefly, then vomited.  EMS brought her to [facility] where she received IV fluids and had negative evaluation and was fine.  We reviewed these records today.  She has not had any ongoing symptoms.  No regular exercise.  Overall eating healthy.  Immunizations reviewed, she is due for PCV 20.  She is agreeable to hepatitis C screening.  She is due for follow-up of impaired fasting glucose.  She has a mammogram scheduled, has not previously had a bone density scan, up-to-date with colonoscopy.    Healthy Habits:     In general, how would you rate your overall health?  Good    Frequency of exercise:  2-3 days/week    Duration of exercise:  15-30 minutes    Do you usually eat at least 4 servings of fruit and vegetables a day, include whole grains    & fiber and avoid regularly eating high fat or \"junk\" foods?  Yes    Taking medications regularly:  Yes    Medication side effects:  None    Ability to successfully perform activities of daily living:  No assistance needed    Home Safety:  No safety concerns identified    Hearing Impairment:  No hearing concerns    In the past 6 months, have you been bothered by leaking of urine?  No    In general, how would you rate your overall mental or " emotional health?  Excellent      PHQ-2 Total Score: 0    Additional concerns today:  No      Have you ever done Advance Care Planning? (For example, a Health Directive, POLST, or a discussion with a medical provider or your loved ones about your wishes): Yes, advance care planning is on file.      Fall risk  Fallen 2 or more times in the past year?: No  Any fall with injury in the past year?: No    Cognitive Screening   1) Repeat 3 items (Leader, Season, Table)      2) Clock draw:   NORMAL  3) 3 item recall: Recalls 3 objects  Results: 3 items recalled: COGNITIVE IMPAIRMENT LESS LIKELY    Mini-CogTM Copyright S Elis. Licensed by the author for use in [facility]; reprinted with permission (soob@[facility]). All rights reserved.        Reviewed and updated as needed this visit by clinical staff    Allergies  Meds              Reviewed and updated as needed this visit by Provider                 Social History     Tobacco Use     Smoking status: Never     Smokeless tobacco: Never   Substance Use Topics     Alcohol use: Not on file         Alcohol Use 3/7/2023   Prescreen: >3 drinks/day or >7 drinks/week? No         Current providers sharing in care for this patient include:   Patient Care Team:  Sammie Pérez MD as PCP - General (Family Practice)    The following health maintenance items are reviewed in Epic and correct as of today:  Health Maintenance   Topic Date Due     DEXA  Never done     ANNUAL REVIEW OF HM ORDERS  Never done     HEPATITIS C SCREENING  Never done     Pneumococcal Vaccine: 65+ Years (2 - PPSV23 if available, else PCV20) 11/20/2019     MEDICARE ANNUAL WELLNESS VISIT  03/04/2020     MAMMO SCREENING  12/24/2020     ZOSTER IMMUNIZATION (3 of 3) 11/01/2022     LIPID  03/04/2024     ADVANCE CARE PLANNING  03/04/2024     FALL RISK ASSESSMENT  03/09/2024     DTAP/TDAP/TD IMMUNIZATION (2 - Td or Tdap) 02/12/2025     COLORECTAL CANCER SCREENING  04/15/2025     PHQ-2 (once per calendar  year)  Completed     INFLUENZA VACCINE  Completed     COVID-19 Vaccine  Completed     IPV IMMUNIZATION  Aged Out     MENINGITIS IMMUNIZATION  Aged Out     Lab work is in process  Labs reviewed in EPIC  BP Readings from Last 3 Encounters:   03/09/23 (!) 140/94   01/17/23 (!) 148/100    Wt Readings from Last 3 Encounters:   03/09/23 77.5 kg (170 lb 12.8 oz)   01/17/23 78 kg (172 lb)   03/04/19 83 kg (183 lb)                  Patient Active Problem List   Diagnosis     Impaired fasting glucose     No past surgical history on file.    Social History     Tobacco Use     Smoking status: Never     Smokeless tobacco: Never   Substance Use Topics     Alcohol use: Not on file     Family History   Problem Relation Age of Onset     Breast Cancer Paternal Grandmother      Cancer Mother      Kidney Cancer Mother      Hypertension Father      Anxiety Disorder Sister      Anxiety Disorder Brother          Current Outpatient Medications   Medication Sig Dispense Refill     L. rhamnosus GG/inulin (CULTURELLE PROBIOTICS ORAL) [L. RHAMNOSUS GG/INULIN (CULTURELLE PROBIOTICS ORAL)] Take by mouth.       No Known Allergies  Recent Labs   Lab Test 03/04/19  1358 02/18/15  0840   A1C 5.6  --    * 127   HDL 68 66   TRIG 96 73            Review of Systems   Constitutional: Negative for chills and fever.   HENT: Negative for congestion, ear pain, hearing loss and sore throat.    Eyes: Negative for pain and visual disturbance.   Respiratory: Negative for cough and shortness of breath.    Cardiovascular: Negative for chest pain, palpitations and peripheral edema.   Gastrointestinal: Positive for heartburn. Negative for abdominal pain, constipation, diarrhea, hematochezia and nausea.   Breasts:  Negative for tenderness, breast mass and discharge.   Genitourinary: Negative for dysuria, frequency, genital sores, hematuria, pelvic pain, urgency, vaginal bleeding and vaginal discharge.   Musculoskeletal: Negative for arthralgias, joint swelling  "and myalgias.   Skin: Positive for rash.   Neurological: Negative for dizziness, weakness, headaches and paresthesias.   Psychiatric/Behavioral: Negative for mood changes. The patient is not nervous/anxious.        OBJECTIVE:   BP (!) 148/92   Pulse 90   Temp 97.2  F (36.2  C) (Oral)   Resp 16   Ht 1.619 m (5' 3.75\")   Wt 77.5 kg (170 lb 12.8 oz)   SpO2 99%   BMI 29.55 kg/m   Estimated body mass index is 29.55 kg/m  as calculated from the following:    Height as of this encounter: 1.619 m (5' 3.75\").    Weight as of this encounter: 77.5 kg (170 lb 12.8 oz).  Physical Exam  Physical Examination: General appearance - alert, well appearing, and in no distress, oriented to person, place, and time and normal appearing weight  Mental status - alert, oriented to person, place, and time, normal mood, behavior, speech, dress, motor activity, and thought processes  Eyes - pupils equal and reactive, extraocular eye movements intact  Ears - bilateral TM's and external ear canals normal  Nose - normal and patent, no erythema, discharge or polyps  Mouth - mucous membranes moist, pharynx normal without lesions  Neck - supple, no significant adenopathy  Lymphatics - no palpable lymphadenopathy, no hepatosplenomegaly  Chest - clear to auscultation, no wheezes, rales or rhonchi, symmetric air entry  Heart - normal rate, regular rhythm, normal S1, S2, no murmurs, rubs, clicks or gallops  Abdomen - soft, nontender, nondistended, no masses or organomegaly  Breasts - breasts appear normal, no suspicious masses, no skin or nipple changes or axillary nodes  Neurological - alert, oriented, normal speech, no focal findings or movement disorder noted  Musculoskeletal - no joint tenderness, deformity or swelling  Extremities - peripheral pulses normal, no pedal edema, no clubbing or cyanosis  Skin - normal coloration and turgor, skin is normal in appearance and texture.  I note a few linear welts on her upper extremities bilaterally.  " "Dermatographia is him is demonstrated during visit.        ASSESSMENT / PLAN:     Medicare annual wellness visit, subsequent  At today's visit, we discussed lifestyle interventions to promote self-management and wellness, including maintenance of a healthy weight, healthy diet, regular physical activity and exercise, and falls prevention.  We will screen for diabetes and dyslipidemia.  Order placed for bone density scan to screen for osteoporosis.  We will screen for hepatitis C infection.  Advised PCV 20, she will check insurance coverage.  - Lipid panel reflex to direct LDL Fasting; Future    Need for hepatitis C screening test  - Hepatitis C Screen Reflex to HCV RNA Quant and Genotype; Future    Lipid screening  - Lipid panel reflex to direct LDL Fasting; Future    Asymptomatic postmenopausal status  - DEXA HIP/PELVIS/SPINE - Future; Future    Impaired fasting glucose  Encourage efforts at healthy lifestyle habits especially reintroducing regular exercise.  We will check fasting glucose and A1c.  - Hemoglobin A1c; Future  - Comprehensive metabolic panel; Future    Dermatographia  We will check for underlying abnormalities.  Advised initiation of Zyrtec.  - Comprehensive metabolic panel; Future  - CBC with Platelets & Differential; Future    Itching  We will check for underlying contributing factors like liver or kidney dysfunction, will assess for hematologic abnormalities.  Advised use of good lotion, initiation of Zyrtec as above, and encouraged him to have the new water softener checked.  - Comprehensive metabolic panel; Future  - CBC with Platelets & Differential; Future     Patient has been advised of split billing requirements and indicates understanding: Yes      COUNSELING:  Reviewed preventive health counseling, as reflected in patient instructions      BMI:   Estimated body mass index is 29.55 kg/m  as calculated from the following:    Height as of this encounter: 1.619 m (5' 3.75\").    Weight as of " this encounter: 77.5 kg (170 lb 12.8 oz).   Weight management plan: Discussed healthy diet and exercise guidelines      She reports that she has never smoked. She has never used smokeless tobacco.      Appropriate preventive services were discussed with this patient, including applicable screening as appropriate for cardiovascular disease, diabetes, osteopenia/osteoporosis, and glaucoma.  As appropriate for age/gender, discussed screening for colorectal cancer, prostate cancer, breast cancer, and cervical cancer. Checklist reviewing preventive services available has been given to the patient.    Reviewed patients plan of care and provided an AVS. The Basic Care Plan (routine screening as documented in Health Maintenance) for Nia meets the Care Plan requirement. This Care Plan has been established and reviewed with the Patient and spouse.          Sammie Pérez MD  [facility]    Identified Health Risks:

## 2023-03-10 LAB — HCV AB SERPL QL IA: NONREACTIVE

## 2023-03-15 ENCOUNTER — HOSPITAL ENCOUNTER (OUTPATIENT)
Dept: MAMMOGRAPHY | Facility: CLINIC | Age: 73
Discharge: HOME | End: 2023-03-15
Attending: FAMILY MEDICINE | Admitting: FAMILY MEDICINE
Payer: MEDICARE

## 2023-03-15 DIAGNOSIS — Z12.31 VISIT FOR SCREENING MAMMOGRAM: ICD-10-CM

## 2023-03-15 PROCEDURE — 77067 SCR MAMMO BI INCL CAD: CPT

## 2023-03-20 ENCOUNTER — ANCILLARY PROCEDURE (OUTPATIENT)
Dept: BONE DENSITY | Facility: CLINIC | Age: 73
End: 2023-03-20
Attending: FAMILY MEDICINE
Payer: MEDICARE

## 2023-03-20 DIAGNOSIS — Z78.0 ASYMPTOMATIC POSTMENOPAUSAL STATUS: ICD-10-CM

## 2023-03-20 PROCEDURE — 77080 DXA BONE DENSITY AXIAL: CPT | Mod: TC | Performed by: RADIOLOGY

## 2023-03-23 ENCOUNTER — HOSPITAL ENCOUNTER (OUTPATIENT)
Dept: MAMMOGRAPHY | Facility: CLINIC | Age: 73
Discharge: HOME | End: 2023-03-23
Attending: FAMILY MEDICINE
Payer: MEDICARE

## 2023-03-23 DIAGNOSIS — N64.89 BREAST ASYMMETRY: ICD-10-CM

## 2023-03-23 PROCEDURE — 77061 BREAST TOMOSYNTHESIS UNI: CPT | Mod: RT

## 2023-03-23 PROCEDURE — 76642 ULTRASOUND BREAST LIMITED: CPT | Mod: RT

## 2023-03-23 NOTE — PROGRESS NOTES
Radiologist, Dr Blanca Martinez, recommends right breast ultrasound guided biopsy.     Pt is requesting appt after she returns from vacation and Dr Martinez agreed pt can wait until she returns. I scheduled pt at [facility], [address] , [address], [place], [place]. [phone].  Appt: Thursday, 4/20/23, arrival at 12:45pm for 1:00pm appt.     I reviewed the procedure with patient and her , who accompanied pt to today's appointment, and gave patient written pre and post biopsy handouts to take home.     Pt and  verbalized understanding of procedure and appt location, date, and time. Calls welcomed.    Genie Mckeon, RN, BSN, [facility]  [facility] [place]

## 2023-03-23 NOTE — LETTER
Nia Prieto  [address] [address]            March 23, 2023      Date of Exam: 3/23/23      Dear Nia:    Thank you for your recent visit.    Breast Imaging Result: Based on your recent breast imaging, you have a suspicious area that usually requires a biopsy, at which time a small tissue sample would be taken from your breast.      If you have already made these arrangements, please disregard this letter.    A report of your breast imaging results was sent to: Sammie Pérez    Your breast imaging will become part of your medical file here at [facility] for at least 10 years. You are responsible for informing any new health care team or breast imaging facility of the date and location of this examination.    We appreciate the opportunity to participate in your health care.    Sincerely,  Blanca Martinez DO   [facility]

## 2023-04-20 ENCOUNTER — HOSPITAL ENCOUNTER (OUTPATIENT)
Dept: MAMMOGRAPHY | Facility: CLINIC | Age: 73
Discharge: HOME | End: 2023-04-20
Attending: FAMILY MEDICINE
Payer: MEDICARE

## 2023-04-20 DIAGNOSIS — R92.8 OTHER ABNORMAL AND INCONCLUSIVE FINDINGS ON DIAGNOSTIC IMAGING OF BREAST: ICD-10-CM

## 2023-04-20 DIAGNOSIS — N64.89 BREAST ASYMMETRY: ICD-10-CM

## 2023-04-20 PROCEDURE — 999N000065 MA POST PROCEDURE RIGHT

## 2023-04-20 PROCEDURE — 250N000009 HC RX 250: Performed by: FAMILY MEDICINE

## 2023-04-20 PROCEDURE — 19083 BX BREAST 1ST LESION US IMAG: CPT | Mod: RT

## 2023-04-20 PROCEDURE — 88305 TISSUE EXAM BY PATHOLOGIST: CPT | Mod: TC | Performed by: FAMILY MEDICINE

## 2023-04-20 RX ORDER — CETIRIZINE HYDROCHLORIDE 10 MG/1
10 TABLET ORAL DAILY
COMMUNITY

## 2023-04-20 RX ADMIN — LIDOCAINE HYDROCHLORIDE 10 ML: 10 SOLUTION INTRAVENOUS at 13:22

## 2023-04-20 NOTE — PROGRESS NOTES
Nia arrived at [facility], accompanied by her . I escorted pt to the Breast Center consult room. Pt was identified using full name and . Wristband was placed on pt wrist. Pt was able to state which procedure and correct side breast biopsy was occurring today. RN reviewed the consent form with the pt and pt was given the consent form to review before signing.     RN reviewed post breast biopsy care. Pt acknowledged understanding of post biopsy care. Pt was given written post breast biopsy care handout to take home.    I explained results are expected in 3-5 business days and Breast Center RN will call pt at number pt provided today.     Pt had no questions or concerns.     I escorted pt to the changing room and pt changed into gown. Pt placed personal belongings in a locker and pt has the reyes. I escorted pt to US room and pt sat on chair. I offered pt the aroma therapy of Calming Blend oil, and a warm blanket, and pt accepted both. I notified Order Mapper Tech, Brett Byers, pt was ready and waiting in US room and that pt reported to me that she has fainted in the past when she is nervous.     Genie Mckeon, RN, BSN, CBCN  [facility]

## 2023-04-27 ENCOUNTER — TELEPHONE (OUTPATIENT)
Dept: MAMMOGRAPHY | Facility: CLINIC | Age: 73
End: 2023-04-27
Payer: MEDICARE

## 2023-04-27 NOTE — TELEPHONE ENCOUNTER
"I spoke with Dr Carvajal, Pathologist, today, regarding pt's 4/20/23 pending pathology from breast biopsy performed at [facility]. The specimens are being sent to [facility] today for further consultation. When this occurs results are usually available from [place] in approx 7-10 business days.     I called Nia regarding the above status of pathology results. I reassured pt the Breast Center RNs monitor for return of results and will get results to pt once they become available. Pt appreciative of call and the reassurance it's being monitored for return of results.     Pt reports she is healing well from breast biopsy, stating \"no problems\". Mild tenderness, no swelling, and wearing supportive bra. I reassured pt and instructed pt to see PCP if symptoms worsen or new symptoms of concern appear. Pt verbalizes understanding and agrees with plan.    Genie Mckeon, RN, BSN, [facility]  [facility]  "

## 2023-05-03 ENCOUNTER — TELEPHONE (OUTPATIENT)
Dept: MAMMOGRAPHY | Facility: CLINIC | Age: 73
End: 2023-05-03
Payer: MEDICARE

## 2023-05-03 LAB
PATH REPORT.COMMENTS IMP SPEC: ABNORMAL
PATH REPORT.COMMENTS IMP SPEC: YES
PATH REPORT.FINAL DX SPEC: ABNORMAL
PATH REPORT.GROSS SPEC: ABNORMAL
PATH REPORT.MICROSCOPIC SPEC OTHER STN: ABNORMAL
PATH REPORT.MICROSCOPIC SPEC OTHER STN: ABNORMAL
PATH REPORT.RELEVANT HX SPEC: ABNORMAL
PHOTO IMAGE: ABNORMAL

## 2023-05-03 PROCEDURE — 88342 IMHCHEM/IMCYTCHM 1ST ANTB: CPT | Mod: 26 | Performed by: PATHOLOGY

## 2023-05-03 PROCEDURE — 88305 TISSUE EXAM BY PATHOLOGIST: CPT | Mod: 26 | Performed by: PATHOLOGY

## 2023-05-03 PROCEDURE — 88341 IMHCHEM/IMCYTCHM EA ADD ANTB: CPT | Mod: 26 | Performed by: PATHOLOGY

## 2023-05-03 PROCEDURE — 88360 TUMOR IMMUNOHISTOCHEM/MANUAL: CPT | Mod: 26 | Performed by: PATHOLOGY

## 2023-05-03 NOTE — TELEPHONE ENCOUNTER
Following review of pathology and breast imaging by Dr Blanca Martinez, I telephoned patient to inform patient of malignant pathology from 4/20/23, right breast biopsy performed at [facility]. Pt placed call on speaker phone so pt's  could participate in call. We discussed breast anatomy, pathology findings, and next steps. Patient's questions were answered to the best of my ability.     Patient is scheduled to meet with Dr Lillian Marie -breast surgeon, and Dr Lennie Herrera- medical oncologist, on Tuesday 5/9/23, arriving at 1:30pm for 1:45pm/2:15pm appts respectively, at [facility]. Patient verbalized understanding of appointment details, location, and visitor policy of two people being allowed to accompany pt to appt.     I provided emotional support and encouragement. Pt verbalized understanding of information given and acceptance of plan. Calls welcomed.    I will route this note to ordering provider, Dr Sammie Pérez.    Genie Mckeon, RN, BSN, [facility]

## 2023-05-08 NOTE — PROGRESS NOTES
History:  Christel is a 72 year old female who I'm asked to see by Dr. Pérez for evaluation of a right breast cancer.  She presents with her , José Miguel.  This was picked up by screening mammogram.  This was a new asymmetry compared to mammogram from 2018.  She underwent diagnostic work-up.  A needle biopsy was done which shows at least a grade I microinvasive carcinoma.  It is estrogen receptor positive, progesterone receptor positive, and HER-2 negative.  She denies having any breast symptoms such as palpable masses, skin changes, or nipple discharge.  Since her needle biopsy, she has had bruising and fullness deep to the bruise.    Past medical history:  Denies    Past surgical history:  Bilateral cataract extraction    Medications:     cetirizine (ZYRTEC) 10 MG tablet, Take 10 mg by mouth daily, Disp: , Rfl:      L. rhamnosus GG/inulin (CULTURELLE PROBIOTICS ORAL), [L. RHAMNOSUS GG/INULIN (CULTURELLE PROBIOTICS ORAL)] Take by mouth. (Patient not taking: Reported on 5/9/2023), Disp: , Rfl:     Allergies:  No Known Allergies    Social History:  Denies alcohol, tobacco, illicit drug use.  Is a retired .  No children.    Family History:  She has a paternal aunt and paternal grandmother with history of breast cancer.  Mother had kidney cancer.  She has 2 siblings without a history of cancer.    Review of systems:  General ROS: No complaints or constitutional symptoms  Skin: No complaints or symptoms   Hematologic/Lymphatic: No symptoms or complaints  Psychiatric: No symptoms or complaints  Endocrine: No excessive fatigue, no hypermetabolic symptoms reported  Respiratory ROS: No cough, shortness of breath, or wheezing  Cardiovascular ROS: No chest pain or dyspnea on exertion  Breast ROS: Bruising since biopsy  Gastrointestinal ROS: No abdominal pain, nausea, diarrhea, or constipation  Musculoskeletal ROS: No recent injuries reported  Neurological ROS: No focal neurologic defects reported.      Physical  "exam:  Ht 1.619 m (5' 3.74\")   BMI 29.56 kg/m    General: Alert, cooperative, appears stated age   Skin: Skin color, texture, turgor normal, no rashes or lesions   Lymphatic: No obvious adenopathy, no swelling   Eyes: No scleral icterus, pupils equal  HENT: No traumatic injury to the head or face, no gross abnormalities  Lungs: Normal respiratory effort, breath sounds equal bilaterally  Heart: Regular rate and rhythm  Breasts: Right breast smaller than the left.  Right breast 12:00 zone 2 with 1 cm fullness deep to the bruise.  Otherwise no visible or palpable abnormality bilaterally.  Abdomen: Soft, non-distended and non-tender to palpation  Neurologic: Grossly intact    Imaging:  Pertinent images personally reviewed by myself and discussed with the patient.    Radiology reports:  EXAM: MA DIAGNOSTIC RIGHT W/ TELMA, US BREAST RIGHT LIMITED 1-3 QUADRANTS  LOCATION: [facility]  DATE/TIME: 3/23/2023 10:46 AM     INDICATION: Abnormal screening mammogram. 72-year-old female presents for imaging follow-up for right breast asymmetry demonstrated on recent screening mammography.  COMPARISON: 03/15/2023, 12/24/2018, 04/26/2016, 09/22/2015, 03/13/2015, 03/09/2015     MAMMOGRAPHIC FINDINGS: Right full-field digital diagnostic mammogram performed. There are scattered areas of fibroglandular density.Breast tomosynthesis was used in interpretation.   There is an irregular mass with obscured margins within the right breast at the 1:00 position, posterior depth. This finding correlates with the asymmetry demonstrated on recent screening mammography. Recommend targeted right breast ultrasound for further evaluation. The remaining right breast parenchyma is unremarkable.     ULTRASOUND FINDINGS: Sonographic evaluation of the right upper breast was performed. At the 11:00 position, 4 cm from the nipple there is a subtle, 0.6 x 0.5 x 0.8 cm irregular, hypoechoic mass with indistinct margins and mild " associated internal   vascularity. This finding correlates with the mass demonstrated on mammography.  Sonographic evaluation of the right axilla was performed demonstrating a few nonenlarged and morphologically normal lymph nodes demonstrating retained fatty damaris and thin cortices.                                                                    IMPRESSION:  1.  Irregular, hypoechoic mass within the right breast at the 11:00 position correlates with the mammographic finding of concern. Recommend ultrasound-guided biopsy of this mass for definitive pathologic diagnosis.  2.  No right axillary lymphadenopathy.     ACR BI-RADS Category 4: Suspicious.    My interpretation:  Mass seen within right breast upper outer quadrant.  Clip in good location.    Pathology:  RIGHT BREAST, 11 O'CLOCK POSITION, 4 CM FROM NIPPLE, ULTRASOUND-GUIDED NEEDLE CORE BIOPSY:  1.          MICROINVASIVE DUCTAL CARCINOMA (LESS THAN 1 MM IN GREATEST DIMENSION:                (TOO SMALL TO GRADE)  2.          DUCTAL CARCINOMA IN-SITU (DCIS): PRESENT                a.           NUCLEAR ndGndRndAndDndEnd:nd nd2nd OF 3                b.          PATTERNS: SOLID PAPILLARY TYPE  3.          ADDITIONAL FINDINGS: MICROINVASIVE COMPONENT DEMONSTRATED ON                   P63 AND CALPONIN IMMUNOSTAINS (ABSENCE OF MYOEPITHELIAL CELLS)  4.          BREAST CANCER PROGNOSTIC MARKER ANALYSIS:                a.           ESTROGEN RECEPTOR: POSITIVE (95% OF CELLS, STRONG STAINING)                b.          PROGESTERONE RECEPTOR: POSITIVE (95% OF CELLS, STRONG STAINING)                c.           HER-2/GEORGE: NEGATIVE (ABSENCE OF MEMBRANE STAINING)    IMPRESSION:  Right breast microinvasive ductal carcinoma    - Grade I, T1, N0, ER/NV+, HER2-    PLAN:   Discussed the surgical options for treatment of breast cancer which generally are a lumpectomy (partial mastectomy) combined with radiation versus a mastectomy.  Explained that the survival benefit is the same for both.  The  difference is in local recurrence risk.  The patient is a good candidate for a lumpectomy.  It would require preoperative localization.  Discussed SLN biopsy.  The procedure and rationale were explained.  Discussed that at this point we do not know yet whether or not she will need chemotherapy and we may not know until we get all of the results of surgery back.  Sometimes the need for chemotherapy is dependent upon an Oncotype score.  Since the tumor is estrogen receptor positive, she will be a candidate for endocrine therapy.    After our discussion, all questions were answered to satisfaction.  She is interested in pursuing genetic testing.  If she had a positive result, she would be interested in pursuing bilateral mastectomy without reconstruction.  If it came back negative then she would be interested in lumpectomy.  Therefore, orders have been placed for the breast actionable panel.  We will have her blood drawn today.  I will call her in about 3 weeks once I have the results.  In anticipation of undergoing surgery, I discussed the risks and benefits of surgery along with the expected recovery time and restrictions.  She would then follow-up with myself about 1-2 weeks after surgery to review final pathology and next steps.    Lillian Marie DO  General Surgeon  [facility]  [facility]  [address] [address]  Office: [phone]  Employed by - [facility] [facility]

## 2023-05-08 NOTE — TELEPHONE ENCOUNTER
RECORDS STATUS - BREAST    RECORDS REQUESTED FROM: Epic   DATE REQUESTED:    NOTES DETAILS STATUS   OFFICE NOTE from referring provider Epic 03/09/23: Dr. Sammie Pérez   MEDICATION LIST [facility]    LABS     PATHOLOGY REPORTS  (Tissue diagnosis, Stage, ER/SD percentage positive and intensity of staining, HER2 IHC, FISH, and all biopsies from breast and any distant metastasis)                 Report in Epic 04/20/23: [id number]   IMAGING (NEED IMAGES & REPORT)     MAMMO PACS 04/20/23-03/09/15   ULTRASOUND PACS 04/20/23-03/13/15: US Breast   BONE SCAN PACS 03/20/23

## 2023-05-09 ENCOUNTER — PATIENT OUTREACH (OUTPATIENT)
Dept: ONCOLOGY | Facility: CLINIC | Age: 73
End: 2023-05-09
Payer: MEDICARE

## 2023-05-09 ENCOUNTER — LAB (OUTPATIENT)
Dept: INFUSION THERAPY | Facility: CLINIC | Age: 73
End: 2023-05-09
Attending: INTERNAL MEDICINE
Payer: MEDICARE

## 2023-05-09 ENCOUNTER — OFFICE VISIT (OUTPATIENT)
Dept: SURGERY | Facility: CLINIC | Age: 73
End: 2023-05-09
Attending: SURGERY
Payer: MEDICARE

## 2023-05-09 ENCOUNTER — OFFICE VISIT (OUTPATIENT)
Dept: ONCOLOGY | Facility: CLINIC | Age: 73
End: 2023-05-09
Attending: INTERNAL MEDICINE
Payer: MEDICARE

## 2023-05-09 ENCOUNTER — PRE VISIT (OUTPATIENT)
Dept: ONCOLOGY | Facility: CLINIC | Age: 73
End: 2023-05-09
Payer: MEDICARE

## 2023-05-09 VITALS — BODY MASS INDEX: 29.56 KG/M2 | HEIGHT: 64 IN

## 2023-05-09 VITALS — HEIGHT: 64 IN | BODY MASS INDEX: 29.56 KG/M2

## 2023-05-09 DIAGNOSIS — C50.911 DUCTAL CARCINOMA IN SITU (DCIS) OF RIGHT BREAST WITH MICROINVASIVE COMPONENT (H): Primary | ICD-10-CM

## 2023-05-09 DIAGNOSIS — C50.911: ICD-10-CM

## 2023-05-09 DIAGNOSIS — C50.911 DUCTAL CARCINOMA IN SITU (DCIS) OF RIGHT BREAST WITH MICROINVASIVE COMPONENT (H): ICD-10-CM

## 2023-05-09 LAB
INTERPRETATION: NORMAL
LAB PDF RESULT: NORMAL
SIGNIFICANT RESULTS: NORMAL
SPECIMEN DESCRIPTION: NORMAL
TEST DETAILS, MDL: NORMAL

## 2023-05-09 PROCEDURE — G0463 HOSPITAL OUTPT CLINIC VISIT: HCPCS

## 2023-05-09 PROCEDURE — G0463 HOSPITAL OUTPT CLINIC VISIT: HCPCS | Mod: 27 | Performed by: INTERNAL MEDICINE

## 2023-05-09 PROCEDURE — 36415 COLL VENOUS BLD VENIPUNCTURE: CPT

## 2023-05-09 PROCEDURE — 99205 OFFICE O/P NEW HI 60 MIN: CPT | Performed by: INTERNAL MEDICINE

## 2023-05-09 PROCEDURE — 99204 OFFICE O/P NEW MOD 45 MIN: CPT | Performed by: SURGERY

## 2023-05-09 ASSESSMENT — PAIN SCALES - GENERAL: PAINLEVEL: NO PAIN (0)

## 2023-05-09 NOTE — PROGRESS NOTES
Writer received referral to Cancer Risk Management/Genetic Counseling.    Referred for: breast cancer     Referral reviewed for appropriate plan, and sent to New Patient Scheduling for completion.    Mery Garcia, RN, BSN  Oncology New Patient Nurse Navigator   [facility]  [phone]

## 2023-05-09 NOTE — LETTER
5/9/2023         RE: Nia Prieto  [address] [facility] [place] [address]        Dear Colleague,    Thank you for referring your patient, Nia Prieto, to the [facility] CANCER [facility]. Please see a copy of my visit note below.    [facility] Hematology and Oncology Consult Note    Patient: Nia Prieto  MRN: [id number]  Date of Service: May 9, 2023       Reason for Visit    Chief Complaint   Patient presents with     Oncology Clinic Visit     New Consult,  Breast Cancer, R,         Assessment/Plan    #.  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  ER positive, LA positive, HER2 negative.       Discussed about clinical course and the biposy result in detail.    I discussed that microinvasive ductal carcinoma is defined that the invasion of less than 1 mm to the basement membrane.  It is determined as stage I early stage breast cancer, however, its prognosis is similar to DCIS and treatment is similar to DCIS.  I explained to her that final pathology might change based on larger area or to evaluate.  I discussed the general treatment goal of early-stage breast cancer which is for cure.  I discussed overview of early stage breast cancer treatment including surgery, +/- adjuvant radiation therapy depending on the type of surgery and final pathology, and adjuvant endocrine therapy.  Adjuvant chemotherapy is not indicated if the final pathology showed DCIS and microinvasive cancer only.  Oncotype test will not be necessary if the final pathology is the same.  However if there is invasive cancer, Oncotype testing can be considered to evaluate the indication for adjuvant chemotherapy.    She will be meeting with Dr. Marie to discuss about surgical options.    I discussed that adjuvant endocrine therapy will be indicated given estrogen receptor positive breast cancer.  I discussed the overview of adjuvant endocrine therapy options,  side effects and duration of therapy.                Follow-up with me in a couple weeks after completion of surgery.    Discussed and coordinated plan of care with Dr. Marie in the multidisciplinary breast clinic.      ECOG Performance 0 - Independent    Encounter Diagnoses:    Problem List Items Addressed This Visit        Oncology Diagnoses    Ductal carcinoma in situ of breast with microinvasive component, right (H)         ______________________________________________________________________________    Staging History   Cancer Staging   No matching staging information was found for the patient.      History    Ms. Nia Prieto is a very pleasant 72 year old female presented today accompanied by her , José Miguel.    She was found a new symmetry by screening mammogram compared to the one prior from 2018.  Subsequent work-up showed microinvasive carcinoma, ER positive, MT positive and HER2 negative.  She does not have any palpable masses, pain.  She does not have headache.  She does not have appetite changes.  No unintentional weight loss.    She is very healthy.  No chronic medical issues.    She does not smoke.  She does not drink alcohol.  She is a retired schoolteacher.  She is .  No children.  Menopause in her late 50s    Family history is significant for breast cancer in her paternal grandmother and paternal grand aunt.  Her mother had metastatic cancer suspected from renal cell but details were unknown.    Review of systems.  Apart from describing in history, the remainder of comprehensive ROS was negative.    Past History    No past medical history on file.  No past surgical history on file.  Family History   Problem Relation Age of Onset     Breast Cancer Paternal Grandmother      Cancer Mother      Kidney Cancer Mother      Hypertension Father      Anxiety Disorder Sister      Anxiety Disorder Brother      Social History     Socioeconomic History     Marital status:    Tobacco Use  "    Smoking status: Never     Smokeless tobacco: Never   Vaping Use     Vaping status: Never Used       Allergies    No Known Allergies    Physical Exam    Ht 1.619 m (5' 3.74\")   BMI 29.56 kg/m      General: alert, awake, not in acute distress  HEENT: Head: Normal, normocephalic, atraumatic.  Eye: Normal external eye, conjunctiva, lids cornea, MOY.  Nose: Normal external nose, mucus membranes and septum.  Pharynx: Normal buccal mucosa. Normal pharynx.  Neck / Thyroid: Supple, no masses, nodes, nodules or enlargement.  Lymphatics: No abnormally enlarged lymph nodes.  Chest: Normal chest wall and respirations. Clear to auscultation.  Heart: S1 S2 RRR, no murmur.   Abdomen: abdomen is soft without significant tenderness, masses, organomegaly or guarding  Extremities: normal strength, tone, and muscle mass  Skin: normal. no rash or abnormalities  CNS: non focal.    Lab Results    No results found for this or any previous visit (from the past 168 hour(s)).    Imaging Results    US Breast Biopsy Core Needle Right    Addendum Date: 5/3/2023    Pathology shows MICROINVASIVE DUCTAL CARCINOMA and DCIS. This is consistent with imaging findings. A verbal report was given to the patient. Surgical consultation is recommended.     Result Date: 5/3/2023  US Breast Biopsy Core Needle Right MA Post Procedure Right INDICATION: Right breast mass. PROCEDURE: Informed consent was obtained from the patient. Ultrasound was used to localize the mass at the 11 o'clock position of the right breast, 4 cm from the nipple.  The skin was marked, then prepped and draped in a sterile fashion. 1% lidocaine was used for local anesthesia. Under direct sonographic guidance, a 12-gauge coaxial needle was used to obtain 3 core biopsies.  A biopsy marking clip was then placed.  Digital mammograms performed in a separate room, using separate equipment, to document clip placement. The mammogram showed the clip to be in good position. The patient " "tolerated this well; there are no immediate complications.    IMPRESSION: 1. Ultrasound-guided biopsy of mass in the right breast. Pathology pending. 2. Post procedure mammogram for marker placement.     MA Post Procedure Right    Addendum Date: 5/3/2023    Pathology shows MICROINVASIVE DUCTAL CARCINOMA and DCIS. This is consistent with imaging findings. A verbal report was given to the patient. Surgical consultation is recommended.     Result Date: 5/3/2023  US Breast Biopsy Core Needle Right MA Post Procedure Right INDICATION: Right breast mass. PROCEDURE: Informed consent was obtained from the patient. Ultrasound was used to localize the mass at the 11 o'clock position of the right breast, 4 cm from the nipple.  The skin was marked, then prepped and draped in a sterile fashion. 1% lidocaine was used for local anesthesia. Under direct sonographic guidance, a 12-gauge coaxial needle was used to obtain 3 core biopsies.  A biopsy marking clip was then placed.  Digital mammograms performed in a separate room, using separate equipment, to document clip placement. The mammogram showed the clip to be in good position. The patient tolerated this well; there are no immediate complications.    IMPRESSION: 1. Ultrasound-guided biopsy of mass in the right breast. Pathology pending. 2. Post procedure mammogram for marker placement.     60 minutes spent on the date of the encounter doing chart review, history and exam, documentation and further activities as noted above.    Signed by: Lennie Herrera MD       Oncology Rooming Note    May 9, 2023 1:46 PM   Nia Prieto is a 72 year old female who presents for:    Chief Complaint   Patient presents with     Oncology Clinic Visit     New Consult,  Breast Cancer, R,     Initial Vitals: Ht 1.619 m (5' 3.74\")   BMI 29.56 kg/m   Estimated body mass index is 29.56 kg/m  as calculated from the following:    Height as of this encounter: 1.619 m (5' 3.74\").    Weight as of 3/9/23: " 77.5 kg (170 lb 12.8 oz). Body surface area is 1.87 meters squared.  No Pain (0) Comment: Data Unavailable   No LMP recorded. Patient is premenopausal.  Allergies reviewed: Yes  Medications reviewed: Yes    Medications: Medication refills not needed today.  Pharmacy name entered into [facility]:    [id number] - [place], [place] - [address] AT [facility] [address]  [facility] [id number] [place] [facility] [place] [place] - [address]    Clinical concerns: New Consult      Darlin Caal MA                        Again, thank you for allowing me to participate in the care of your patient.        Sincerely,        Lennie Herrera MD

## 2023-05-09 NOTE — LETTER
5/9/2023         RE: Nia Prieto  [address] [facility] [address]        Dear Colleague,    Thank you for referring your patient, Nia Prieto, to the [facility] BREAST CENTER. Please see a copy of my visit note below.    History:  Christel is a 72 year old female who I'm asked to see by Dr. Pérez for evaluation of a right breast cancer.  She presents with her , José Miguel.  This was picked up by screening mammogram.  This was a new asymmetry compared to mammogram from 2018.  She underwent diagnostic work-up.  A needle biopsy was done which shows at least a grade I microinvasive carcinoma.  It is estrogen receptor positive, progesterone receptor positive, and HER-2 negative.  She denies having any breast symptoms such as palpable masses, skin changes, or nipple discharge.  Since her needle biopsy, she has had bruising and fullness deep to the bruise.    Past medical history:  Denies    Past surgical history:  Bilateral cataract extraction    Medications:     cetirizine (ZYRTEC) 10 MG tablet, Take 10 mg by mouth daily, Disp: , Rfl:      L. rhamnosus GG/inulin (CULTURELLE PROBIOTICS ORAL), [L. RHAMNOSUS GG/INULIN (CULTURELLE PROBIOTICS ORAL)] Take by mouth. (Patient not taking: Reported on 5/9/2023), Disp: , Rfl:     Allergies:  No Known Allergies    Social History:  Denies alcohol, tobacco, illicit drug use.  Is a retired .  No children.    Family History:  She has a paternal aunt and paternal grandmother with history of breast cancer.  Mother had kidney cancer.  She has 2 siblings without a history of cancer.    Review of systems:  General ROS: No complaints or constitutional symptoms  Skin: No complaints or symptoms   Hematologic/Lymphatic: No symptoms or complaints  Psychiatric: No symptoms or complaints  Endocrine: No excessive fatigue, no hypermetabolic symptoms reported  Respiratory ROS: No cough, shortness of breath, or wheezing  Cardiovascular ROS: No  "chest pain or dyspnea on exertion  Breast ROS: Bruising since biopsy  Gastrointestinal ROS: No abdominal pain, nausea, diarrhea, or constipation  Musculoskeletal ROS: No recent injuries reported  Neurological ROS: No focal neurologic defects reported.      Physical exam:  Ht 1.619 m (5' 3.74\")   BMI 29.56 kg/m    General: Alert, cooperative, appears stated age   Skin: Skin color, texture, turgor normal, no rashes or lesions   Lymphatic: No obvious adenopathy, no swelling   Eyes: No scleral icterus, pupils equal  HENT: No traumatic injury to the head or face, no gross abnormalities  Lungs: Normal respiratory effort, breath sounds equal bilaterally  Heart: Regular rate and rhythm  Breasts: Right breast smaller than the left.  Right breast 12:00 zone 2 with 1 cm fullness deep to the bruise.  Otherwise no visible or palpable abnormality bilaterally.  Abdomen: Soft, non-distended and non-tender to palpation  Neurologic: Grossly intact    Imaging:  Pertinent images personally reviewed by myself and discussed with the patient.    Radiology reports:  EXAM: MA DIAGNOSTIC RIGHT W/ TELMA, US BREAST RIGHT LIMITED 1-3 QUADRANTS  LOCATION: [facility]  DATE/TIME: 3/23/2023 10:46 AM     INDICATION: Abnormal screening mammogram. 72-year-old female presents for imaging follow-up for right breast asymmetry demonstrated on recent screening mammography.  COMPARISON: 03/15/2023, 12/24/2018, 04/26/2016, 09/22/2015, 03/13/2015, 03/09/2015     MAMMOGRAPHIC FINDINGS: Right full-field digital diagnostic mammogram performed. There are scattered areas of fibroglandular density.Breast tomosynthesis was used in interpretation.   There is an irregular mass with obscured margins within the right breast at the 1:00 position, posterior depth. This finding correlates with the asymmetry demonstrated on recent screening mammography. Recommend targeted right breast ultrasound for further evaluation. The remaining right breast " parenchyma is unremarkable.     ULTRASOUND FINDINGS: Sonographic evaluation of the right upper breast was performed. At the 11:00 position, 4 cm from the nipple there is a subtle, 0.6 x 0.5 x 0.8 cm irregular, hypoechoic mass with indistinct margins and mild associated internal   vascularity. This finding correlates with the mass demonstrated on mammography.  Sonographic evaluation of the right axilla was performed demonstrating a few nonenlarged and morphologically normal lymph nodes demonstrating retained fatty damaris and thin cortices.                                                                    IMPRESSION:  1.  Irregular, hypoechoic mass within the right breast at the 11:00 position correlates with the mammographic finding of concern. Recommend ultrasound-guided biopsy of this mass for definitive pathologic diagnosis.  2.  No right axillary lymphadenopathy.     ACR BI-RADS Category 4: Suspicious.    My interpretation:  Mass seen within right breast upper outer quadrant.  Clip in good location.    Pathology:  RIGHT BREAST, 11 O'CLOCK POSITION, 4 CM FROM NIPPLE, ULTRASOUND-GUIDED NEEDLE CORE BIOPSY:  1.          MICROINVASIVE DUCTAL CARCINOMA (LESS THAN 1 MM IN GREATEST DIMENSION:                (TOO SMALL TO GRADE)  2.          DUCTAL CARCINOMA IN-SITU (DCIS): PRESENT                a.           NUCLEAR ndGndRndAndDndEnd:nd nd2nd OF 3                b.          PATTERNS: SOLID PAPILLARY TYPE  3.          ADDITIONAL FINDINGS: MICROINVASIVE COMPONENT DEMONSTRATED ON                   P63 AND CALPONIN IMMUNOSTAINS (ABSENCE OF MYOEPITHELIAL CELLS)  4.          BREAST CANCER PROGNOSTIC MARKER ANALYSIS:                a.           ESTROGEN RECEPTOR: POSITIVE (95% OF CELLS, STRONG STAINING)                b.          PROGESTERONE RECEPTOR: POSITIVE (95% OF CELLS, STRONG STAINING)                c.           HER-2/GEORGE: NEGATIVE (ABSENCE OF MEMBRANE STAINING)    IMPRESSION:  Right breast microinvasive ductal carcinoma    - Grade I, T1,  "N0, ER/WV+, HER2-    PLAN:   Discussed the surgical options for treatment of breast cancer which generally are a lumpectomy (partial mastectomy) combined with radiation versus a mastectomy.  Explained that the survival benefit is the same for both.  The difference is in local recurrence risk.  The patient is a good candidate for a lumpectomy.  It would require preoperative localization.  Discussed SLN biopsy.  The procedure and rationale were explained.  Discussed that at this point we do not know yet whether or not she will need chemotherapy and we may not know until we get all of the results of surgery back.  Sometimes the need for chemotherapy is dependent upon an Oncotype score.  Since the tumor is estrogen receptor positive, she will be a candidate for endocrine therapy.    After our discussion, all questions were answered to satisfaction.  She is interested in pursuing genetic testing.  If she had a positive result, she would be interested in pursuing bilateral mastectomy without reconstruction.  If it came back negative then she would be interested in lumpectomy.  Therefore, orders have been placed for the breast actionable panel.  We will have her blood drawn today.  I will call her in about 3 weeks once I have the results.  In anticipation of undergoing surgery, I discussed the risks and benefits of surgery along with the expected recovery time and restrictions.  She would then follow-up with myself about 1-2 weeks after surgery to review final pathology and next steps.    Lillian Marie DO  General Surgeon  [facility]  Breast [address] [address]  Office: [phone]  Employed by - [employer]          Urology Rooming Note    May 9, 2023 1:39 PM   Nia Prieto is a 72 year old female who presents for:    Chief Complaint   Patient presents with     Oncology Clinic Visit     Initial Vitals: Ht 1.619 m (5' 3.74\")   " "BMI 29.56 kg/m   Estimated body mass index is 29.56 kg/m  as calculated from the following:    Height as of this encounter: 1.619 m (5' 3.74\").    Weight as of 3/9/23: 77.5 kg (170 lb 12.8 oz). Body surface area is 1.87 meters squared.  Data Unavailable Comment: Data Unavailable     Allergies reviewed: Yes  Medications reviewed: Yes    Medications: Medication refills not needed today.  Pharmacy name entered into [facility]:    [id number] - [place], [place] - [address] AT [facility] & [facility] [address] [address] [place], [place] - [address] MARILYN Caal MA      Again, thank you for allowing me to participate in the care of your patient.        Sincerely,        Lillian Marie, DO    "

## 2023-05-09 NOTE — PROGRESS NOTES
"Oncology Rooming Note    May 9, 2023 1:46 PM   Nia Prieto is a 72 year old female who presents for:    Chief Complaint   Patient presents with     Oncology Clinic Visit     New Consult,  Breast Cancer, R,     Initial Vitals: Ht 1.619 m (5' 3.74\")   BMI 29.56 kg/m   Estimated body mass index is 29.56 kg/m  as calculated from the following:    Height as of this encounter: 1.619 m (5' 3.74\").    Weight as of 3/9/23: 77.5 kg (170 lb 12.8 oz). Body surface area is 1.87 meters squared.  No Pain (0) Comment: Data Unavailable   No LMP recorded. Patient is premenopausal.  Allergies reviewed: Yes  Medications reviewed: Yes    Medications: Medication refills not needed today.  Pharmacy name entered into [facility]:    [id number] - [place], [place] - [address] AT [facility] [address] [facility] [address]  [facility] [address] [place] [facility] [place], [place] - [address]    Clinical concerns: New Consult      Darlin Caal MA                    "

## 2023-05-09 NOTE — PROGRESS NOTES
[facility] Hematology and Oncology Consult Note    Patient: Nia Prieto  MRN: [id number]  Date of Service: May 9, 2023       Reason for Visit    Chief Complaint   Patient presents with     Oncology Clinic Visit     New Consult,  Breast Cancer, R,         Assessment/Plan    #.  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  ER positive, MN positive, HER2 negative.       Discussed about clinical course and the biposy result in detail.    I discussed that microinvasive ductal carcinoma is defined that the invasion of less than 1 mm to the basement membrane.  It is determined as stage I early stage breast cancer, however, its prognosis is similar to DCIS and treatment is similar to DCIS.  I explained to her that final pathology might change based on larger area or to evaluate.  I discussed the general treatment goal of early-stage breast cancer which is for cure.  I discussed overview of early stage breast cancer treatment including surgery, +/- adjuvant radiation therapy depending on the type of surgery and final pathology, and adjuvant endocrine therapy.  Adjuvant chemotherapy is not indicated if the final pathology showed DCIS and microinvasive cancer only.  Oncotype test will not be necessary if the final pathology is the same.  However if there is invasive cancer, Oncotype testing can be considered to evaluate the indication for adjuvant chemotherapy.    She will be meeting with Dr. Marie to discuss about surgical options.    I discussed that adjuvant endocrine therapy will be indicated given estrogen receptor positive breast cancer.  I discussed the overview of adjuvant endocrine therapy options, side effects and duration of therapy.                Follow-up with me in a couple weeks after completion of surgery.    Discussed and coordinated plan of care with Dr. Marie in the multidisciplinary breast clinic.      ECOG Performance 0 - Independent    Encounter  "Diagnoses:    Problem List Items Addressed This Visit        Oncology Diagnoses    Ductal carcinoma in situ of breast with microinvasive component, right (H)         ______________________________________________________________________________    Staging History   Cancer Staging   No matching staging information was found for the patient.      History    Ms. Nia Prieto is a very pleasant 72 year old female presented today accompanied by her , José Miguel.    She was found a new symmetry by screening mammogram compared to the one prior from 2018.  Subsequent work-up showed microinvasive carcinoma, ER positive, AK positive and HER2 negative.  She does not have any palpable masses, pain.  She does not have headache.  She does not have appetite changes.  No unintentional weight loss.    She is very healthy.  No chronic medical issues.    She does not smoke.  She does not drink alcohol.  She is a retired schoolteacher.  She is .  No children.  Menopause in her late 50s    Family history is significant for breast cancer in her paternal grandmother and paternal grand aunt.  Her mother had metastatic cancer suspected from renal cell but details were unknown.    Review of systems.  Apart from describing in history, the remainder of comprehensive ROS was negative.    Past History    No past medical history on file.  No past surgical history on file.  Family History   Problem Relation Age of Onset     Breast Cancer Paternal Grandmother      Cancer Mother      Kidney Cancer Mother      Hypertension Father      Anxiety Disorder Sister      Anxiety Disorder Brother      Social History     Socioeconomic History     Marital status:    Tobacco Use     Smoking status: Never     Smokeless tobacco: Never   Vaping Use     Vaping status: Never Used       Allergies    No Known Allergies    Physical Exam    Ht 1.619 m (5' 3.74\")   BMI 29.56 kg/m      General: alert, awake, not in acute distress  HEENT: Head: Normal, " normocephalic, atraumatic.  Eye: Normal external eye, conjunctiva, lids cornea, MOY.  Nose: Normal external nose, mucus membranes and septum.  Pharynx: Normal buccal mucosa. Normal pharynx.  Neck / Thyroid: Supple, no masses, nodes, nodules or enlargement.  Lymphatics: No abnormally enlarged lymph nodes.  Chest: Normal chest wall and respirations. Clear to auscultation.  Heart: S1 S2 RRR, no murmur.   Abdomen: abdomen is soft without significant tenderness, masses, organomegaly or guarding  Extremities: normal strength, tone, and muscle mass  Skin: normal. no rash or abnormalities  CNS: non focal.    Lab Results    No results found for this or any previous visit (from the past 168 hour(s)).    Imaging Results    US Breast Biopsy Core Needle Right    Addendum Date: 5/3/2023    Pathology shows MICROINVASIVE DUCTAL CARCINOMA and DCIS. This is consistent with imaging findings. A verbal report was given to the patient. Surgical consultation is recommended.     Result Date: 5/3/2023  US Breast Biopsy Core Needle Right MA Post Procedure Right INDICATION: Right breast mass. PROCEDURE: Informed consent was obtained from the patient. Ultrasound was used to localize the mass at the 11 o'clock position of the right breast, 4 cm from the nipple.  The skin was marked, then prepped and draped in a sterile fashion. 1% lidocaine was used for local anesthesia. Under direct sonographic guidance, a 12-gauge coaxial needle was used to obtain 3 core biopsies.  A biopsy marking clip was then placed.  Digital mammograms performed in a separate room, using separate equipment, to document clip placement. The mammogram showed the clip to be in good position. The patient tolerated this well; there are no immediate complications.    IMPRESSION: 1. Ultrasound-guided biopsy of mass in the right breast. Pathology pending. 2. Post procedure mammogram for marker placement.     MA Post Procedure Right    Addendum Date: 5/3/2023    Pathology shows  MICROINVASIVE DUCTAL CARCINOMA and DCIS. This is consistent with imaging findings. A verbal report was given to the patient. Surgical consultation is recommended.     Result Date: 5/3/2023  US Breast Biopsy Core Needle Right MA Post Procedure Right INDICATION: Right breast mass. PROCEDURE: Informed consent was obtained from the patient. Ultrasound was used to localize the mass at the 11 o'clock position of the right breast, 4 cm from the nipple.  The skin was marked, then prepped and draped in a sterile fashion. 1% lidocaine was used for local anesthesia. Under direct sonographic guidance, a 12-gauge coaxial needle was used to obtain 3 core biopsies.  A biopsy marking clip was then placed.  Digital mammograms performed in a separate room, using separate equipment, to document clip placement. The mammogram showed the clip to be in good position. The patient tolerated this well; there are no immediate complications.    IMPRESSION: 1. Ultrasound-guided biopsy of mass in the right breast. Pathology pending. 2. Post procedure mammogram for marker placement.     60 minutes spent on the date of the encounter doing chart review, history and exam, documentation and further activities as noted above.    Signed by: Lennie Herrera MD

## 2023-05-09 NOTE — PROGRESS NOTES
"Urology Rooming Note    May 9, 2023 1:39 PM   Nia Priteo is a 72 year old female who presents for:    Chief Complaint   Patient presents with     Oncology Clinic Visit     Initial Vitals: Ht 1.619 m (5' 3.74\")   BMI 29.56 kg/m   Estimated body mass index is 29.56 kg/m  as calculated from the following:    Height as of this encounter: 1.619 m (5' 3.74\").    Weight as of 3/9/23: 77.5 kg (170 lb 12.8 oz). Body surface area is 1.87 meters squared.  Data Unavailable Comment: Data Unavailable     Allergies reviewed: Yes  Medications reviewed: Yes    Medications: Medication refills not needed today.  Pharmacy name entered into [facility]:    [id number] - [place], [place] - [address] AT [facility] [address] [facility] [address] [address] [place], [place] - [address] [place]  "

## 2023-05-15 LAB — INTERPRETATION: NORMAL

## 2023-05-23 ENCOUNTER — LAB (OUTPATIENT)
Dept: LAB | Facility: CLINIC | Age: 73
End: 2023-05-23
Payer: MEDICARE

## 2023-05-23 ENCOUNTER — APPOINTMENT (OUTPATIENT)
Dept: LAB | Facility: CLINIC | Age: 73
End: 2023-05-23
Payer: MEDICARE

## 2023-05-23 DIAGNOSIS — C50.911 DUCTAL CARCINOMA IN SITU (DCIS) OF RIGHT BREAST WITH MICROINVASIVE COMPONENT (H): Primary | ICD-10-CM

## 2023-05-23 PROCEDURE — 81162 BRCA1&2 GEN FULL SEQ DUP/DEL: CPT | Performed by: SURGERY

## 2023-05-23 PROCEDURE — G0452 MOLECULAR PATHOLOGY INTERPR: HCPCS | Mod: 26 | Performed by: PATHOLOGY

## 2023-05-24 ENCOUNTER — PREP FOR PROCEDURE (OUTPATIENT)
Dept: SURGERY | Facility: CLINIC | Age: 73
End: 2023-05-24
Payer: MEDICARE

## 2023-05-24 DIAGNOSIS — C50.911 DUCTAL CARCINOMA IN SITU (DCIS) OF RIGHT BREAST WITH MICROINVASIVE COMPONENT (H): Primary | ICD-10-CM

## 2023-05-24 RX ORDER — CEFAZOLIN SODIUM/WATER 2 G/20 ML
2 SYRINGE (ML) INTRAVENOUS
Status: CANCELLED | OUTPATIENT
Start: 2023-06-08

## 2023-05-25 ENCOUNTER — TELEPHONE (OUTPATIENT)
Dept: SURGERY | Facility: CLINIC | Age: 73
End: 2023-05-25
Payer: MEDICARE

## 2023-06-02 NOTE — TELEPHONE ENCOUNTER
I talked with Nia and finalized all details for surgery on June 8th with Dr. Marie. I let her know I will send a confirmation letter to her MyChart. She's in agreement with the plan.

## 2023-06-05 ENCOUNTER — OFFICE VISIT (OUTPATIENT)
Dept: FAMILY MEDICINE | Facility: CLINIC | Age: 73
End: 2023-06-05
Payer: MEDICARE

## 2023-06-05 VITALS
BODY MASS INDEX: 28.12 KG/M2 | SYSTOLIC BLOOD PRESSURE: 137 MMHG | WEIGHT: 164.7 LBS | OXYGEN SATURATION: 99 % | DIASTOLIC BLOOD PRESSURE: 88 MMHG | TEMPERATURE: 98.1 F | HEART RATE: 91 BPM | HEIGHT: 64 IN | RESPIRATION RATE: 12 BRPM

## 2023-06-05 DIAGNOSIS — C50.911 DUCTAL CARCINOMA IN SITU (DCIS) OF RIGHT BREAST WITH MICROINVASIVE COMPONENT (H): ICD-10-CM

## 2023-06-05 DIAGNOSIS — Z01.818 PREOP GENERAL PHYSICAL EXAM: Primary | ICD-10-CM

## 2023-06-05 DIAGNOSIS — L30.9 ECZEMA, UNSPECIFIED TYPE: ICD-10-CM

## 2023-06-05 DIAGNOSIS — J30.1 SEASONAL ALLERGIC RHINITIS DUE TO POLLEN: ICD-10-CM

## 2023-06-05 DIAGNOSIS — R73.01 IMPAIRED FASTING GLUCOSE: ICD-10-CM

## 2023-06-05 LAB — HGB BLD-MCNC: 14 G/DL (ref 11.7–15.7)

## 2023-06-05 PROCEDURE — 36415 COLL VENOUS BLD VENIPUNCTURE: CPT | Performed by: FAMILY MEDICINE

## 2023-06-05 PROCEDURE — 99214 OFFICE O/P EST MOD 30 MIN: CPT | Performed by: FAMILY MEDICINE

## 2023-06-05 PROCEDURE — 85018 HEMOGLOBIN: CPT | Performed by: FAMILY MEDICINE

## 2023-06-05 RX ORDER — HYDROCORTISONE VALERATE CREAM 2 MG/G
CREAM TOPICAL 2 TIMES DAILY PRN
Qty: 30 G | Refills: 1 | Status: SHIPPED | OUTPATIENT
Start: 2023-06-05 | End: 2023-07-12

## 2023-06-05 ASSESSMENT — PAIN SCALES - GENERAL: PAINLEVEL: NO PAIN (0)

## 2023-06-05 NOTE — PROGRESS NOTES
[facility]  [address] [facility] [address]  [facility] [address]  Phone: [phone]  Fax: [phone]  Primary Provider: Pamela Calix  Pre-op Performing Provider: PAMELA CALIX      PREOPERATIVE EVALUATION:  Today's date: 6/5/2023    Nia Prieto is a 72 year old female who presents for a preoperative evaluation.       View : No data to display.              Surgical Information:  Surgery/Procedure: LUMPECTOMY  Surgery Location: [facility]   Surgeon: DR MEEKS  Surgery Date: 06/08/2023  Time of Surgery: 9:00AM   Where patient plans to recover: At home with family  Fax number for surgical facility: Note does not need to be faxed, will be available electronically in Epic.    Assessment & Plan     The proposed surgical procedure is considered LOW risk.    Preop general physical exam  No significant surgical risks or anesthesia risks identified.  She may proceed with surgery as scheduled without further clinical clarification or evaluation and may proceed with choice of anesthesia.  - Hemoglobin; Future    Ductal carcinoma in situ (DCIS) of right breast with microinvasive component (H)  To proceed with lumpectomy as scheduled.    Impaired fasting glucose  A1c normal at 5.5% in March.  Not requiring additional intervention or monitoring.    Eczema, unspecified type  Prescription provided for hydrocortisone.  This is very mild, no open sores.  May proceed with surgery.  - hydrocortisone (WESTCORT) 0.2 % external cream; Apply topically 2 times daily as needed (eczema)    Seasonal allergic rhinitis due to pollen  She will continue cetirizine before and after surgery.          - No identified additional risk factors other than previously addressed    Antiplatelet or Anticoagulation Medication Instructions:   - Patient is on no antiplatelet or anticoagulation medications.    Additional Medication Instructions:  Patient is to take all scheduled medications on the day of  surgery    RECOMMENDATION:  APPROVAL GIVEN to proceed with proposed procedure, without further diagnostic evaluation.            Subjective     HPI related to upcoming procedure: Abnormality identified on routine screening mammogram, biopsy indicating right breast DCIS with microinvasion, requiring surgical resection.  ER positive, anticipating endocrine therapy postoperatively.  She is having some struggles with mild rash on her left wrist that is itchy, no open sores.  Triamcinolone was not helpful in the past.  Taking Zyrtec for mild allergic rhinitis.  History of impaired fasting glucose with baseline A1c at 5.5% in March.        6/1/2023     8:13 PM   Preop Questions   1. Have you ever had a heart attack or stroke? No   2. Have you ever had surgery on your heart or blood vessels, such as a stent placement, a coronary artery bypass, or surgery on an artery in your head, neck, heart, or legs? No   3. Do you have chest pain with activity? No   4. Do you have a history of  heart failure? No   5. Do you currently have a cold, bronchitis or symptoms of other infection? No   6. Do you have a cough, shortness of breath, or wheezing? No   7. Do you or anyone in your family have previous history of blood clots? No   8. Do you or does anyone in your family have a serious bleeding problem such as prolonged bleeding following surgeries or cuts? No   9. Have you ever had problems with anemia or been told to take iron pills? No   10. Have you had any abnormal blood loss such as black, tarry or bloody stools, or abnormal vaginal bleeding? No   11. Have you ever had a blood transfusion? No   12. Are you willing to have a blood transfusion if it is medically needed before, during, or after your surgery? Yes   13. Have you or any of your relatives ever had problems with anesthesia? No   14. Do you have sleep apnea, excessive snoring or daytime drowsiness? No   15. Do you have any artifical heart valves or other implanted medical  devices like a pacemaker, defibrillator, or continuous glucose monitor? No   16. Do you have artificial joints? No   17. Are you allergic to latex? No       Health Care Directive:  Patient does not have a Health Care Directive or Living Will: Patient states has Advance Directive and will bring in a copy to clinic.    Preoperative Review of :   reviewed - no record of controlled substances prescribed.        Review of Systems  Constitutional, neuro, ENT, endocrine, pulmonary, cardiac, gastrointestinal, genitourinary, musculoskeletal, integument and psychiatric systems are negative, except as otherwise noted.    Patient Active Problem List    Diagnosis Date Noted     Ductal carcinoma in situ (DCIS) of right breast with microinvasive component (H) 05/09/2023     Priority: Medium     Impaired fasting glucose 02/19/2015     Priority: Medium      No past medical history on file.  Past Surgical History:   Procedure Laterality Date     CATARACT EXTRACTION Bilateral      Current Outpatient Medications   Medication Sig Dispense Refill     cetirizine (ZYRTEC) 10 MG tablet Take 10 mg by mouth daily       hydrocortisone (WESTCORT) 0.2 % external cream Apply topically 2 times daily as needed (eczema) 30 g 1     L. rhamnosus GG/inulin (CULTURELLE PROBIOTICS ORAL)          No Known Allergies     Social History     Tobacco Use     Smoking status: Never     Smokeless tobacco: Never   Vaping Use     Vaping status: Never Used   Substance Use Topics     Alcohol use: Not on file     Family History   Problem Relation Age of Onset     Cancer Mother      Kidney Cancer Mother      Hypertension Father      Anxiety Disorder Sister      Anxiety Disorder Brother      Breast Cancer Paternal Grandmother      Anesthesia Reaction No family hx of      History   Drug Use Not on file         Objective     /88 (BP Location: Right arm, Patient Position: Sitting, Cuff Size: Adult Regular)   Pulse 91   Temp 98.1  F (36.7  C) (Oral)   Resp 12  "  Ht 1.616 m (5' 3.62\")   Wt 74.7 kg (164 lb 11.2 oz)   SpO2 99%   BMI 28.61 kg/m      Physical Exam    GENERAL APPEARANCE: healthy, alert and no distress     EYES: EOMI, PERRL     HENT: ear canals and TM's normal and nose and mouth without ulcers or lesions     NECK: no adenopathy, no asymmetry, masses, or scars and thyroid normal to palpation     RESP: lungs clear to auscultation - no rales, rhonchi or wheezes     CV: regular rates and rhythm, normal S1 S2, no S3 or S4 and no murmur, click or rub     ABDOMEN:  soft, nontender, no HSM or masses and bowel sounds normal     MS: extremities normal- no gross deformities noted, no evidence of inflammation in joints, FROM in all extremities.     SKIN: Mild eczematous changes left wrist, no open sores     NEURO: Normal strength and tone, sensory exam grossly normal, mentation intact and speech normal     PSYCH: mentation appears normal. and affect normal/bright     LYMPHATICS: No cervical adenopathy    Recent Labs   Lab Test 03/09/23  0914   HGB 13.9         POTASSIUM 5.0   CR 0.81   A1C 5.5        Diagnostics:  Labs pending at this time.  Results will be reviewed when available.   No EKG required for low risk surgery (cataract, skin procedure, breast biopsy, etc).  No EKG required, no history of coronary heart disease, significant arrhythmia, peripheral arterial disease or other structural heart disease.    Revised Cardiac Risk Index (RCRI):  The patient has the following serious cardiovascular risks for perioperative complications:   - No serious cardiac risks = 0 points     RCRI Interpretation: 0 points: Class I (very low risk - 0.4% complication rate)           Signed Electronically by: Sammie Pérez MD  Copy of this evaluation report is provided to requesting physician.      "

## 2023-06-05 NOTE — H&P (VIEW-ONLY)
[facility]  [address] [facility] [address]  [facility] [address]  Phone: [phone]  Fax: [phone]  Primary Provider: Pamela Calix  Pre-op Performing Provider: PAMELA CALIX      PREOPERATIVE EVALUATION:  Today's date: 6/5/2023    Nia Prieto is a 72 year old female who presents for a preoperative evaluation.       View : No data to display.              Surgical Information:  Surgery/Procedure: LUMPECTOMY  Surgery Location: [facility]   Surgeon: DR MEEKS  Surgery Date: 06/08/2023  Time of Surgery: 9:00AM   Where patient plans to recover: At home with family  Fax number for surgical facility: Note does not need to be faxed, will be available electronically in Epic.    Assessment & Plan     The proposed surgical procedure is considered LOW risk.    Preop general physical exam  No significant surgical risks or anesthesia risks identified.  She may proceed with surgery as scheduled without further clinical clarification or evaluation and may proceed with choice of anesthesia.  - Hemoglobin; Future    Ductal carcinoma in situ (DCIS) of right breast with microinvasive component (H)  To proceed with lumpectomy as scheduled.    Impaired fasting glucose  A1c normal at 5.5% in March.  Not requiring additional intervention or monitoring.    Eczema, unspecified type  Prescription provided for hydrocortisone.  This is very mild, no open sores.  May proceed with surgery.  - hydrocortisone (WESTCORT) 0.2 % external cream; Apply topically 2 times daily as needed (eczema)    Seasonal allergic rhinitis due to pollen  She will continue cetirizine before and after surgery.          - No identified additional risk factors other than previously addressed    Antiplatelet or Anticoagulation Medication Instructions:   - Patient is on no antiplatelet or anticoagulation medications.    Additional Medication Instructions:  Patient is to take all scheduled medications on the day of  surgery    RECOMMENDATION:  APPROVAL GIVEN to proceed with proposed procedure, without further diagnostic evaluation.            Subjective     HPI related to upcoming procedure: Abnormality identified on routine screening mammogram, biopsy indicating right breast DCIS with microinvasion, requiring surgical resection.  ER positive, anticipating endocrine therapy postoperatively.  She is having some struggles with mild rash on her left wrist that is itchy, no open sores.  Triamcinolone was not helpful in the past.  Taking Zyrtec for mild allergic rhinitis.  History of impaired fasting glucose with baseline A1c at 5.5% in March.        6/1/2023     8:13 PM   Preop Questions   1. Have you ever had a heart attack or stroke? No   2. Have you ever had surgery on your heart or blood vessels, such as a stent placement, a coronary artery bypass, or surgery on an artery in your head, neck, heart, or legs? No   3. Do you have chest pain with activity? No   4. Do you have a history of  heart failure? No   5. Do you currently have a cold, bronchitis or symptoms of other infection? No   6. Do you have a cough, shortness of breath, or wheezing? No   7. Do you or anyone in your family have previous history of blood clots? No   8. Do you or does anyone in your family have a serious bleeding problem such as prolonged bleeding following surgeries or cuts? No   9. Have you ever had problems with anemia or been told to take iron pills? No   10. Have you had any abnormal blood loss such as black, tarry or bloody stools, or abnormal vaginal bleeding? No   11. Have you ever had a blood transfusion? No   12. Are you willing to have a blood transfusion if it is medically needed before, during, or after your surgery? Yes   13. Have you or any of your relatives ever had problems with anesthesia? No   14. Do you have sleep apnea, excessive snoring or daytime drowsiness? No   15. Do you have any artifical heart valves or other implanted medical  devices like a pacemaker, defibrillator, or continuous glucose monitor? No   16. Do you have artificial joints? No   17. Are you allergic to latex? No       Health Care Directive:  Patient does not have a Health Care Directive or Living Will: Patient states has Advance Directive and will bring in a copy to clinic.    Preoperative Review of :   reviewed - no record of controlled substances prescribed.        Review of Systems  Constitutional, neuro, ENT, endocrine, pulmonary, cardiac, gastrointestinal, genitourinary, musculoskeletal, integument and psychiatric systems are negative, except as otherwise noted.    Patient Active Problem List    Diagnosis Date Noted     Ductal carcinoma in situ (DCIS) of right breast with microinvasive component (H) 05/09/2023     Priority: Medium     Impaired fasting glucose 02/19/2015     Priority: Medium      No past medical history on file.  Past Surgical History:   Procedure Laterality Date     CATARACT EXTRACTION Bilateral      Current Outpatient Medications   Medication Sig Dispense Refill     cetirizine (ZYRTEC) 10 MG tablet Take 10 mg by mouth daily       hydrocortisone (WESTCORT) 0.2 % external cream Apply topically 2 times daily as needed (eczema) 30 g 1     L. rhamnosus GG/inulin (CULTURELLE PROBIOTICS ORAL)          No Known Allergies     Social History     Tobacco Use     Smoking status: Never     Smokeless tobacco: Never   Vaping Use     Vaping status: Never Used   Substance Use Topics     Alcohol use: Not on file     Family History   Problem Relation Age of Onset     Cancer Mother      Kidney Cancer Mother      Hypertension Father      Anxiety Disorder Sister      Anxiety Disorder Brother      Breast Cancer Paternal Grandmother      Anesthesia Reaction No family hx of      History   Drug Use Not on file         Objective     /88 (BP Location: Right arm, Patient Position: Sitting, Cuff Size: Adult Regular)   Pulse 91   Temp 98.1  F (36.7  C) (Oral)   Resp 12  "  Ht 1.616 m (5' 3.62\")   Wt 74.7 kg (164 lb 11.2 oz)   SpO2 99%   BMI 28.61 kg/m      Physical Exam    GENERAL APPEARANCE: healthy, alert and no distress     EYES: EOMI, PERRL     HENT: ear canals and TM's normal and nose and mouth without ulcers or lesions     NECK: no adenopathy, no asymmetry, masses, or scars and thyroid normal to palpation     RESP: lungs clear to auscultation - no rales, rhonchi or wheezes     CV: regular rates and rhythm, normal S1 S2, no S3 or S4 and no murmur, click or rub     ABDOMEN:  soft, nontender, no HSM or masses and bowel sounds normal     MS: extremities normal- no gross deformities noted, no evidence of inflammation in joints, FROM in all extremities.     SKIN: Mild eczematous changes left wrist, no open sores     NEURO: Normal strength and tone, sensory exam grossly normal, mentation intact and speech normal     PSYCH: mentation appears normal. and affect normal/bright     LYMPHATICS: No cervical adenopathy    Recent Labs   Lab Test 03/09/23  0914   HGB 13.9         POTASSIUM 5.0   CR 0.81   A1C 5.5        Diagnostics:  Labs pending at this time.  Results will be reviewed when available.   No EKG required for low risk surgery (cataract, skin procedure, breast biopsy, etc).  No EKG required, no history of coronary heart disease, significant arrhythmia, peripheral arterial disease or other structural heart disease.    Revised Cardiac Risk Index (RCRI):  The patient has the following serious cardiovascular risks for perioperative complications:   - No serious cardiac risks = 0 points     RCRI Interpretation: 0 points: Class I (very low risk - 0.4% complication rate)           Signed Electronically by: Sammie Pérez MD  Copy of this evaluation report is provided to requesting physician.      "

## 2023-06-07 ENCOUNTER — ANCILLARY PROCEDURE (OUTPATIENT)
Dept: MAMMOGRAPHY | Facility: CLINIC | Age: 73
End: 2023-06-07
Attending: SURGERY
Payer: MEDICARE

## 2023-06-07 ENCOUNTER — ANESTHESIA EVENT (OUTPATIENT)
Dept: SURGERY | Facility: HOSPITAL | Age: 73
End: 2023-06-07
Payer: MEDICARE

## 2023-06-07 ENCOUNTER — DOCUMENTATION ONLY (OUTPATIENT)
Dept: OTHER | Facility: CLINIC | Age: 73
End: 2023-06-07
Payer: MEDICARE

## 2023-06-07 DIAGNOSIS — N63.0 BREAST MASS IN FEMALE: ICD-10-CM

## 2023-06-07 PROCEDURE — 999N000065 MA POST PROCEDURE RIGHT

## 2023-06-07 PROCEDURE — A4648 IMPLANTABLE TISSUE MARKER: HCPCS

## 2023-06-07 PROCEDURE — 250N000009 HC RX 250: Performed by: SURGERY

## 2023-06-07 PROCEDURE — 272N000431 US BREAST LOCALIZER PLACEMENT 1ST LESION RIGHT

## 2023-06-07 RX ADMIN — LIDOCAINE HYDROCHLORIDE 10 ML: 10 INJECTION, SOLUTION INFILTRATION; PERINEURAL at 08:55

## 2023-06-08 ENCOUNTER — HOSPITAL ENCOUNTER (OUTPATIENT)
Facility: HOSPITAL | Age: 73
Discharge: HOME | End: 2023-06-08
Attending: SURGERY | Admitting: SURGERY
Payer: MEDICARE

## 2023-06-08 ENCOUNTER — HOSPITAL ENCOUNTER (OUTPATIENT)
Dept: NUCLEAR MEDICINE | Facility: HOSPITAL | Age: 73
Discharge: HOME | End: 2023-06-08
Attending: SURGERY | Admitting: SURGERY
Payer: MEDICARE

## 2023-06-08 ENCOUNTER — ANESTHESIA (OUTPATIENT)
Dept: SURGERY | Facility: HOSPITAL | Age: 73
End: 2023-06-08
Payer: MEDICARE

## 2023-06-08 VITALS
WEIGHT: 165 LBS | HEIGHT: 64 IN | SYSTOLIC BLOOD PRESSURE: 156 MMHG | DIASTOLIC BLOOD PRESSURE: 79 MMHG | OXYGEN SATURATION: 98 % | TEMPERATURE: 97.5 F | RESPIRATION RATE: 16 BRPM | BODY MASS INDEX: 28.17 KG/M2 | HEART RATE: 75 BPM

## 2023-06-08 DIAGNOSIS — C50.911 DUCTAL CARCINOMA IN SITU (DCIS) OF RIGHT BREAST WITH MICROINVASIVE COMPONENT (H): ICD-10-CM

## 2023-06-08 DIAGNOSIS — G89.18 POSTOPERATIVE PAIN: Primary | ICD-10-CM

## 2023-06-08 PROCEDURE — 999N000141 HC STATISTIC PRE-PROCEDURE NURSING ASSESSMENT: Performed by: SURGERY

## 2023-06-08 PROCEDURE — 19301 PARTIAL MASTECTOMY: CPT | Mod: RT | Performed by: SURGERY

## 2023-06-08 PROCEDURE — 250N000009 HC RX 250: Performed by: NURSE ANESTHETIST, CERTIFIED REGISTERED

## 2023-06-08 PROCEDURE — 38525 BIOPSY/REMOVAL LYMPH NODES: CPT | Mod: 51 | Performed by: SURGERY

## 2023-06-08 PROCEDURE — 258N000003 HC RX IP 258 OP 636: Performed by: NURSE ANESTHETIST, CERTIFIED REGISTERED

## 2023-06-08 PROCEDURE — 250N000011 HC RX IP 250 OP 636: Performed by: NURSE ANESTHETIST, CERTIFIED REGISTERED

## 2023-06-08 PROCEDURE — 343N000001 HC RX 343: Performed by: SURGERY

## 2023-06-08 PROCEDURE — 710N000012 HC RECOVERY PHASE 2, PER MINUTE: Performed by: SURGERY

## 2023-06-08 PROCEDURE — 999N000127 HC STATISTIC PERIPHERAL IV START W US GUIDANCE

## 2023-06-08 PROCEDURE — A9520 TC99 TILMANOCEPT DIAG 0.5MCI: HCPCS | Performed by: SURGERY

## 2023-06-08 PROCEDURE — 250N000011 HC RX IP 250 OP 636: Performed by: SURGERY

## 2023-06-08 PROCEDURE — 38792 RA TRACER ID OF SENTINL NODE: CPT

## 2023-06-08 PROCEDURE — 370N000017 HC ANESTHESIA TECHNICAL FEE, PER MIN: Performed by: SURGERY

## 2023-06-08 PROCEDURE — 360N000075 HC SURGERY LEVEL 2, PER MIN: Performed by: SURGERY

## 2023-06-08 PROCEDURE — 272N000001 HC OR GENERAL SUPPLY STERILE: Performed by: SURGERY

## 2023-06-08 PROCEDURE — C1760 CLOSURE DEV, VASC: HCPCS | Performed by: SURGERY

## 2023-06-08 PROCEDURE — 88307 TISSUE EXAM BY PATHOLOGIST: CPT | Mod: TC | Performed by: SURGERY

## 2023-06-08 PROCEDURE — 250N000009 HC RX 250: Performed by: SURGERY

## 2023-06-08 PROCEDURE — 250N000013 HC RX MED GY IP 250 OP 250 PS 637: Performed by: ANESTHESIOLOGY

## 2023-06-08 PROCEDURE — 258N000003 HC RX IP 258 OP 636: Performed by: ANESTHESIOLOGY

## 2023-06-08 RX ORDER — DEXAMETHASONE SODIUM PHOSPHATE 4 MG/ML
INJECTION, SOLUTION INTRA-ARTICULAR; INTRALESIONAL; INTRAMUSCULAR; INTRAVENOUS; SOFT TISSUE PRN
Status: DISCONTINUED | OUTPATIENT
Start: 2023-06-08 | End: 2023-06-08

## 2023-06-08 RX ORDER — ONDANSETRON 4 MG/1
4 TABLET, ORALLY DISINTEGRATING ORAL EVERY 30 MIN PRN
Status: DISCONTINUED | OUTPATIENT
Start: 2023-06-08 | End: 2023-06-08 | Stop reason: HOSPADM

## 2023-06-08 RX ORDER — OXYCODONE HYDROCHLORIDE 5 MG/1
5 TABLET ORAL
Status: COMPLETED | OUTPATIENT
Start: 2023-06-08 | End: 2023-06-08

## 2023-06-08 RX ORDER — LIDOCAINE HYDROCHLORIDE 10 MG/ML
INJECTION, SOLUTION INFILTRATION; PERINEURAL PRN
Status: DISCONTINUED | OUTPATIENT
Start: 2023-06-08 | End: 2023-06-08

## 2023-06-08 RX ORDER — BUPIVACAINE HYDROCHLORIDE 2.5 MG/ML
INJECTION, SOLUTION INFILTRATION; PERINEURAL PRN
Status: DISCONTINUED | OUTPATIENT
Start: 2023-06-08 | End: 2023-06-08 | Stop reason: HOSPADM

## 2023-06-08 RX ORDER — ACETAMINOPHEN 325 MG/1
650 TABLET ORAL ONCE
Status: COMPLETED | OUTPATIENT
Start: 2023-06-08 | End: 2023-06-08

## 2023-06-08 RX ORDER — FENTANYL CITRATE 50 UG/ML
INJECTION, SOLUTION INTRAMUSCULAR; INTRAVENOUS PRN
Status: DISCONTINUED | OUTPATIENT
Start: 2023-06-08 | End: 2023-06-08

## 2023-06-08 RX ORDER — ONDANSETRON 2 MG/ML
4 INJECTION INTRAMUSCULAR; INTRAVENOUS EVERY 30 MIN PRN
Status: DISCONTINUED | OUTPATIENT
Start: 2023-06-08 | End: 2023-06-08 | Stop reason: HOSPADM

## 2023-06-08 RX ORDER — OXYCODONE HYDROCHLORIDE 5 MG/1
10 TABLET ORAL
Status: DISCONTINUED | OUTPATIENT
Start: 2023-06-08 | End: 2023-06-08 | Stop reason: HOSPADM

## 2023-06-08 RX ORDER — TRAMADOL HYDROCHLORIDE 50 MG/1
50 TABLET ORAL EVERY 6 HOURS PRN
Qty: 6 TABLET | Refills: 0 | Status: SHIPPED | OUTPATIENT
Start: 2023-06-08 | End: 2023-06-11

## 2023-06-08 RX ORDER — LIDOCAINE 40 MG/G
CREAM TOPICAL
Status: DISCONTINUED | OUTPATIENT
Start: 2023-06-08 | End: 2023-06-08 | Stop reason: HOSPADM

## 2023-06-08 RX ORDER — KETAMINE HYDROCHLORIDE 10 MG/ML
INJECTION INTRAMUSCULAR; INTRAVENOUS PRN
Status: DISCONTINUED | OUTPATIENT
Start: 2023-06-08 | End: 2023-06-08

## 2023-06-08 RX ORDER — CEFAZOLIN SODIUM/WATER 2 G/20 ML
2 SYRINGE (ML) INTRAVENOUS
Status: COMPLETED | OUTPATIENT
Start: 2023-06-08 | End: 2023-06-08

## 2023-06-08 RX ORDER — ONDANSETRON 2 MG/ML
INJECTION INTRAMUSCULAR; INTRAVENOUS PRN
Status: DISCONTINUED | OUTPATIENT
Start: 2023-06-08 | End: 2023-06-08

## 2023-06-08 RX ORDER — GLYCOPYRROLATE 0.2 MG/ML
INJECTION, SOLUTION INTRAMUSCULAR; INTRAVENOUS PRN
Status: DISCONTINUED | OUTPATIENT
Start: 2023-06-08 | End: 2023-06-08

## 2023-06-08 RX ORDER — SODIUM CHLORIDE, SODIUM LACTATE, POTASSIUM CHLORIDE, CALCIUM CHLORIDE 600; 310; 30; 20 MG/100ML; MG/100ML; MG/100ML; MG/100ML
INJECTION, SOLUTION INTRAVENOUS CONTINUOUS
Status: DISCONTINUED | OUTPATIENT
Start: 2023-06-08 | End: 2023-06-08 | Stop reason: HOSPADM

## 2023-06-08 RX ORDER — LIDOCAINE HYDROCHLORIDE 10 MG/ML
INJECTION, SOLUTION INFILTRATION; PERINEURAL PRN
Status: DISCONTINUED | OUTPATIENT
Start: 2023-06-08 | End: 2023-06-08 | Stop reason: HOSPADM

## 2023-06-08 RX ORDER — PROPOFOL 10 MG/ML
INJECTION, EMULSION INTRAVENOUS CONTINUOUS PRN
Status: DISCONTINUED | OUTPATIENT
Start: 2023-06-08 | End: 2023-06-08

## 2023-06-08 RX ADMIN — KETAMINE HYDROCHLORIDE 10 MG: 10 INJECTION, SOLUTION INTRAMUSCULAR; INTRAVENOUS at 11:32

## 2023-06-08 RX ADMIN — ONDANSETRON 4 MG: 2 INJECTION INTRAMUSCULAR; INTRAVENOUS at 11:33

## 2023-06-08 RX ADMIN — PHENYLEPHRINE HYDROCHLORIDE 100 MCG: 10 INJECTION INTRAVENOUS at 11:34

## 2023-06-08 RX ADMIN — Medication 2 G: at 11:15

## 2023-06-08 RX ADMIN — ACETAMINOPHEN 650 MG: 325 TABLET ORAL at 11:02

## 2023-06-08 RX ADMIN — SODIUM CHLORIDE, POTASSIUM CHLORIDE, SODIUM LACTATE AND CALCIUM CHLORIDE 100 ML/HR: 600; 310; 30; 20 INJECTION, SOLUTION INTRAVENOUS at 09:38

## 2023-06-08 RX ADMIN — DEXAMETHASONE SODIUM PHOSPHATE 10 MG: 4 INJECTION, SOLUTION INTRA-ARTICULAR; INTRALESIONAL; INTRAMUSCULAR; INTRAVENOUS; SOFT TISSUE at 11:18

## 2023-06-08 RX ADMIN — KETAMINE HYDROCHLORIDE 10 MG: 10 INJECTION, SOLUTION INTRAMUSCULAR; INTRAVENOUS at 11:51

## 2023-06-08 RX ADMIN — LIDOCAINE HYDROCHLORIDE 5 ML: 10 INJECTION, SOLUTION INFILTRATION; PERINEURAL at 11:08

## 2023-06-08 RX ADMIN — TILMANOCEPT 0.5 MILLICURIE: KIT at 09:15

## 2023-06-08 RX ADMIN — MIDAZOLAM 2 MG: 1 INJECTION INTRAMUSCULAR; INTRAVENOUS at 11:05

## 2023-06-08 RX ADMIN — KETAMINE HYDROCHLORIDE 10 MG: 10 INJECTION, SOLUTION INTRAMUSCULAR; INTRAVENOUS at 11:35

## 2023-06-08 RX ADMIN — GLYCOPYRROLATE 0.2 MG: 0.2 INJECTION INTRAMUSCULAR; INTRAVENOUS at 11:09

## 2023-06-08 RX ADMIN — PROPOFOL 125 MCG/KG/MIN: 10 INJECTION, EMULSION INTRAVENOUS at 11:09

## 2023-06-08 RX ADMIN — OXYCODONE HYDROCHLORIDE 5 MG: 5 TABLET ORAL at 13:07

## 2023-06-08 RX ADMIN — FENTANYL CITRATE 25 MCG: 50 INJECTION, SOLUTION INTRAMUSCULAR; INTRAVENOUS at 11:25

## 2023-06-08 ASSESSMENT — ACTIVITIES OF DAILY LIVING (ADL)
ADLS_ACUITY_SCORE: 35

## 2023-06-08 NOTE — INTERVAL H&P NOTE
Patient seen in preop with her .  LOCalizer tag placed by radiology yesterday.  Denies new medical history since she was last seen.  All questions answered regarding procedure.  Consent obtained.  To the OR for right breast tag localized lumpectomy and sentinel lymph node biopsy.    Lillian Marie DO  General Surgeon  [facility]  Breast Center - [address] [address]?  Office: [phone]  Employed by - [facility]

## 2023-06-08 NOTE — OR NURSING
Red rash developed after tourniquets was on patient  while placing iv starts. Noted on left lower am and upper right bicep. Pt reports excema history . This rash was immediatly after tourniquet removed. Pt will follow up with allergist and primary

## 2023-06-08 NOTE — OP NOTE
Name:  Niadayo Meyerjamel  PCP:  Sammie Pérez  Procedure Date:  6/8/2023      RIGHT BREAST TAG LOCALIZED LUMPECTOMY WITH SENTINEL LYMPH NODE BIOPSY       Pre-Procedure Diagnosis:  Ductal carcinoma in situ (DCIS) of right breast with microinvasive component     Post-Procedure Diagnosis:    Same    Anesthesia Type:    MAC with local    Estimated Blood Loss:   5 mL    Specimens:    Right lumpectomy  Right sentinel lymph node #1 and #2    Complications:    None apparent    Findings:  Tag and previous biopsy clip excised    Leckrone Node Biopsy for Breast Cancer - Right  Operation performed with curative intent Yes   Tracer(s) used to identify sentinel nodes in the upfront surgery (non-neoadjuvant) setting Radioactive tracer   Tracer(s) used to identify sentinel nodes in the neoadjuvant setting N/A   All nodes (colored or non-colored) present at the end of a dye-filled lymphatic channel were removed N/A   All significantly radioactive nodes were removed Yes   All palpably suspicious nodes were removed N/A   Biopsy-proven positive nodes marked with clips prior to chemotherapy were identified and removed N/A       Indication for Procedure:  This is a 72-year-old female who recently had a screening mammogram.  A new asymmetry was identified within the right breast.  Diagnostic work-up revealed a microinvasive carcinoma.  She has elected for breast preservation therapy for treatment.    Operative Report:    On the day prior to surgery, a LOCalizer tag was placed by breast radiology.  This imaging was reviewed.  The breast was injected in preop with Lymphoseek by nuclear medicine for sentinel lymph node injection.  Informed consent was obtained, and the risks and benefits of the procedure were discussed.  The patient was brought back to the operative suite and placed in the supine position.  MAC anesthesia was provided by the department of anesthesia.  The right breast and axilla were prepped and draped in the usual sterile  fashion.  After infiltration with a combination of 1% lidocaine and quarter percent Marcaine a curvilinear shaped incision was made over the lesion in the breast at 10:00 zone 2.  This was carried down through the subcutaneous tissues and the LOCalizer probe was utilized to maintain a good distance away from the tumor.  I started out about 1 cm away from the tag.  I went around the tag and clip widely with electrocautery.  The tag did end up being seen anteriorly.  Tissue ended up being removed over it and secured back to the lumpectomy specimen.  Posteriorly the margin was chest wall.  Once removed, the specimen was marked for orientation and the tag was confirmed to be out of the breast.  The specimen was then sent to pathology for permanent sectioning.  A new clip was left within the central aspect of the lumpectomy cavity.  A curvilinear incision was then made in the lower portion of the axilla, after local infiltration, and carried down deep into the axillary fat pad.  The clavipectoral fascia was incised and the axilla was palpated for abnormalities.  No abnormal lymph nodes were palpated.  Using the gamma probe I identified 2 sentinel lymph nodes, with a count of 901 100.  These were removed with electrocautery and sent for permanent sectioning.  There was no significant remaining gamma activity.  Both wounds are inspected for hemostasis and closed with 3-0 Vicryl to the subcutaneous tissues, followed by interrupted 4-0 Vicryl for the deep dermis, and a subcuticular stitch of 4-0 Monocryl to the skin.  Dermabond was then placed over the incisions.    Instrument, sponge, and needle counts were correct at closure and at the conclusion of the case.     Disposition:  The patient tolerated the procedure well.  They were transferred to the postanesthesia care unit in stable condition.      Lillian Marie DO  General Surgeon  [address]  [place] [address]  Office: [phone]  Employed by [facility]

## 2023-06-08 NOTE — ANESTHESIA PREPROCEDURE EVALUATION
Anesthesia Pre-Procedure Evaluation    Patient: Nia Prieto   MRN: [id number] : 1950        Procedure : Procedure(s):  BREAST TAG LOCALIZED LUMPECTOMY WITH SENTINEL LYMPH NODE BIOPSY          Past Medical History:   Diagnosis Date     Diabetes (H)      Ductal carcinoma in situ (DCIS) of breast with microinvasive component (H)     right     Eczema      Hypertension      Seasonal allergic rhinitis       Past Surgical History:   Procedure Laterality Date     CATARACT EXTRACTION Bilateral      COLONOSCOPY       EYE SURGERY        No Known Allergies   Social History     Tobacco Use     Smoking status: Never     Smokeless tobacco: Never   Vaping Use     Vaping status: Never Used   Substance Use Topics     Alcohol use: Not Currently      Wt Readings from Last 1 Encounters:   23 74.8 kg (165 lb)        Anesthesia Evaluation            ROS/MED HX  ENT/Pulmonary:  - neg pulmonary ROS     Neurologic:  - neg neurologic ROS     Cardiovascular:     (+) hypertension-----    METS/Exercise Tolerance: >4 METS    Hematologic:  - neg hematologic  ROS     Musculoskeletal:  - neg musculoskeletal ROS     GI/Hepatic:  - neg GI/hepatic ROS     Renal/Genitourinary:  - neg Renal ROS     Endo:     (+) type II DM,     Psychiatric/Substance Use:  - neg psychiatric ROS     Infectious Disease:  - neg infectious disease ROS     Malignancy: Comment: Ductal carcinoma in situ (DCIS) of right breast with microinvasive component   (+) Malignancy, History of Breast.    Other:  - neg other ROS          Physical Exam    Airway  airway exam normal      Mallampati: II       Respiratory Devices and Support         Dental           Cardiovascular   cardiovascular exam normal       Rhythm and rate: regular and normal     Pulmonary   pulmonary exam normal        breath sounds clear to auscultation           OUTSIDE LABS:  CBC:   Lab Results   Component Value Date    WBC 6.3 2023    HGB 14.0 2023    HGB 13.9 2023    HCT 41.3  03/09/2023     03/09/2023     BMP:   Lab Results   Component Value Date     03/09/2023    POTASSIUM 5.0 03/09/2023    CHLORIDE 103 03/09/2023    CO2 24 03/09/2023    BUN 12.3 03/09/2023    CR 0.81 03/09/2023     (H) 03/09/2023     03/04/2019     COAGS: No results found for: PTT, INR, FIBR  POC: No results found for: BGM, HCG, HCGS  HEPATIC:   Lab Results   Component Value Date    ALBUMIN 4.6 03/09/2023    PROTTOTAL 7.6 03/09/2023    ALT 7 (L) 03/09/2023    AST 21 03/09/2023    ALKPHOS 59 03/09/2023    BILITOTAL 0.3 03/09/2023     OTHER:   Lab Results   Component Value Date    A1C 5.5 03/09/2023    KRISTY 9.9 03/09/2023       Anesthesia Plan    ASA Status:  3      Anesthesia Type: MAC.   Induction: Intravenous, Propofol.   Maintenance: TIVA.        Consents    Anesthesia Plan(s) and associated risks, benefits, and realistic alternatives discussed. Questions answered and patient/representative(s) expressed understanding.    - Discussed:     - Discussed with:  Patient      - Extended Intubation/Ventilatory Support Discussed: No.      - Patient is DNR/DNI Status: No    Use of blood products discussed: No .     Postoperative Care    Pain management: IV analgesics, Oral pain medications.   PONV prophylaxis: Ondansetron (or other 5HT-3), Dexamethasone or Solumedrol     Comments:    Other Comments: Planned anesthetic: MAC  FiO2 less than 30%  Propofol ggt  Decadron/zofran            Kaushik Lindsay MD

## 2023-06-08 NOTE — ANESTHESIA POSTPROCEDURE EVALUATION
Patient: Nia Prieto    Procedure: Procedure(s):  BREAST TAG LOCALIZED LUMPECTOMY WITH SENTINEL LYMPH NODE BIOPSY       Anesthesia Type:  MAC    Note:  Disposition: Outpatient   Postop Pain Control: Uneventful            Sign Out: Well controlled pain   PONV: No   Neuro/Psych: Uneventful            Sign Out: Acceptable/Baseline neuro status   Airway/Respiratory: Uneventful            Sign Out: Acceptable/Baseline resp. status   CV/Hemodynamics: Uneventful            Sign Out: Acceptable CV status; No obvious hypovolemia; No obvious fluid overload   Other NRE: NONE   DID A NON-ROUTINE EVENT OCCUR? No           Last vitals:  Vitals Value Taken Time   /79 06/08/23 1315   Temp 36.4  C (97.5  F) 06/08/23 1310   Pulse 78 06/08/23 1318   Resp     SpO2 99 % 06/08/23 1318   Vitals shown include unvalidated device data.    Electronically Signed By: Kaushik Lindsay MD  June 8, 2023  2:37 PM

## 2023-06-08 NOTE — ANESTHESIA CARE TRANSFER NOTE
Patient: Nia Prieto    Procedure: Procedure(s):  BREAST TAG LOCALIZED LUMPECTOMY WITH SENTINEL LYMPH NODE BIOPSY       Diagnosis: Ductal carcinoma in situ (DCIS) of right breast with microinvasive component (H) [C50.911]  Diagnosis Additional Information: No value filed.    Anesthesia Type:   MAC     Note:    Oropharynx: oropharynx clear of all foreign objects and spontaneously breathing  Level of Consciousness: awake  Oxygen Supplementation: room air    Independent Airway: airway patency satisfactory and stable  Dentition: dentition unchanged  Vital Signs Stable: post-procedure vital signs reviewed and stable  Report to RN Given: handoff report given  Patient transferred to: Phase II  Comments: Patient is wide awake and alert in phase 2.   Room air. VSS.   Handoff Report: Identifed the Patient, Identified the Reponsible Provider, Reviewed the pertinent medical history, Discussed the surgical course, Reviewed Intra-OP anesthesia mangement and issues during anesthesia, Set expectations for post-procedure period and Allowed opportunity for questions and acknowledgement of understanding      Vitals:  Vitals Value Taken Time   /64 06/08/23 1230   Temp     Pulse 91 06/08/23 1231   Resp     SpO2 96 % 06/08/23 1231   Vitals shown include unvalidated device data.    Electronically Signed By: JUAN FRANCISCO Tariq CRNA  June 8, 2023  12:34 PM

## 2023-06-08 NOTE — OR NURSING
Discharge criteria met; patient alert and oriented x4; able to ambulate to bathroom and void with minimal assist; pain tolerable.  Discharge instructions reviewed with patient and ; both verbalized understanding.  Patient transported to main entrance via wheelchair to be picked up by  and taken home.

## 2023-06-13 PROCEDURE — 88341 IMHCHEM/IMCYTCHM EA ADD ANTB: CPT | Mod: 26 | Performed by: PATHOLOGY

## 2023-06-13 PROCEDURE — 88360 TUMOR IMMUNOHISTOCHEM/MANUAL: CPT | Mod: 26 | Performed by: PATHOLOGY

## 2023-06-13 PROCEDURE — 88307 TISSUE EXAM BY PATHOLOGIST: CPT | Mod: 26 | Performed by: PATHOLOGY

## 2023-06-13 PROCEDURE — 88342 IMHCHEM/IMCYTCHM 1ST ANTB: CPT | Mod: 26 | Performed by: PATHOLOGY

## 2023-06-13 NOTE — PROGRESS NOTES
"History:  Nia Prieto is s/p a right lumpectomy with SLN biopsy on June 8.  She is doing well.  Has been taking a little bit of Tylenol before bed to help her sleep.  She has been going without a bra because that is how she is most comfortable.    Physical exam:  BREAST: Ecchymosis to the right upper outer quadrant and axillary incisions.  Glue over each incision.  No signs of infection.  Firm healing ridge under each incision without significant hematoma or seroma.    Pathology:  A) BREAST, RIGHT, LUMPECTOMY:  -INVASIVE DUCTAL CARCINOMA, VALERIA GRADE 2  -TUMOR SIZE 5.3 x 5 MM   -MARGINS NEGATIVE FOR CARCINOMA (CLOSEST MARGIN, ANTERIOR AT LESS THAN 1 MM; NEXT CLOSEST MARGIN 7 MM, INFERIOR; GREATER THAN 10 MM REMAINING MARGINS)  -FOCAL DUCTAL CARCINOMA IN SITU, SOLID PAPILLARY TYPE, EIC NEGATIVE  -MARGINS NEGATIVE FOR DCIS (CLOSEST MARGIN, ANTERIOR AT 1 MM; NEXT CLOSEST MARGIN 9 MM, INFERIOR; GREATER THAN 10 MM REMAINING MARGINS)  -PREVIOUS BIOPSY SITE PRESENT  -COLUMNAR CELL CHANGE, USUAL AND FLORID DUCTAL HYPERPLASIA, NON-PROLIFERATIVE FIBROCYSTIC CHANGE  -ER/NM/HER2 IMMUNOHISTOCHEMICAL STAINS ARE PENDING ON BLOCK A23 AND RESULTS WILL BE REPORTED IN AN ADDENDUM  -SEE SYNOPTIC REPORT  -SEE COMMENT     B) SENTINEL LYMPH NODE, RIGHT #1, BIOPSY:  -ONE LYMPH NODE NEGATIVE FOR METASTATIC CARCINOMA (0/1)     C) SENTINEL LYMPH NODE, \"RIGHT\" #2, BIOPSY:  -ONE LYMPH NODE NEGATIVE FOR METASTATIC CARCINOMA (0/1)  -SEE COMMENT    ASSESSMENT:  Nia Prieto is s/p right lumpectomy with SLN for an invasive ductal carcinoma    - Grade II, T1, N0, ER/NM+, HER2- --> pathologic prognostic stage IA    PLAN:  Okay for all activities as tolerated.  Pathology was discussed.  Referral placed for radiation oncology.  She will be following back up with Dr. Herrera on June 22.  Continue with yearly mammograms.  150 minutes of aerobic exercise/week with 2 days of strength training is recommended     - This can improve survival and " decrease recurrence risk  Return to the breast clinic in 1 year, or earlier as needed    Lillian Marie DO  General Surgeon  [facility]  Breast [place] - [address] [address]  Office: [phone]  Employed by - [employer]

## 2023-06-14 ENCOUNTER — OFFICE VISIT (OUTPATIENT)
Dept: SURGERY | Facility: CLINIC | Age: 73
End: 2023-06-14
Attending: SURGERY
Payer: MEDICARE

## 2023-06-14 DIAGNOSIS — C50.911 INVASIVE DUCTAL CARCINOMA OF BREAST, RIGHT (H): Primary | ICD-10-CM

## 2023-06-14 LAB
PATH REPORT.ADDENDUM SPEC: ABNORMAL
PATH REPORT.COMMENTS IMP SPEC: ABNORMAL
PATH REPORT.COMMENTS IMP SPEC: YES
PATH REPORT.FINAL DX SPEC: ABNORMAL
PATH REPORT.GROSS SPEC: ABNORMAL
PATH REPORT.MICROSCOPIC SPEC OTHER STN: ABNORMAL
PATH REPORT.RELEVANT HX SPEC: ABNORMAL
PATHOLOGY SYNOPTIC REPORT: ABNORMAL
PHOTO IMAGE: ABNORMAL

## 2023-06-14 PROCEDURE — G0463 HOSPITAL OUTPT CLINIC VISIT: HCPCS | Performed by: SURGERY

## 2023-06-14 PROCEDURE — 99024 POSTOP FOLLOW-UP VISIT: CPT | Performed by: SURGERY

## 2023-06-14 NOTE — NURSING NOTE
Nia presents to [facility] for a post op appointment with Dr. Marie .  She is accompanied by her  for appointment.  RN assessment and EMR update. She states she is doing well, minimal pain.  She has good ROM, reviewed ROM exercises with her.  Patient met with Dr. Marie .  See dictation for details of visit.  She will plan to be referred onto Medical Oncology and Radiation Oncology and will plan to follow up with Dr. Marie  in one year with bilateral mammograms.  Invited calls sooner if she has any questions.  RN time 10 mins.

## 2023-06-14 NOTE — LETTER
"    6/14/2023         RE: Nia Prieto  [address] [facility] [address]        Dear Colleague,    Thank you for referring your patient, Nia Prieto, to the [facility] [place]. Please see a copy of my visit note below.    History:  Nia Prieto is s/p a right lumpectomy with SLN biopsy on June 8.  She is doing well.  Has been taking a little bit of Tylenol before bed to help her sleep.  She has been going without a bra because that is how she is most comfortable.    Physical exam:  BREAST: Ecchymosis to the right upper outer quadrant and axillary incisions.  Glue over each incision.  No signs of infection.  Firm healing ridge under each incision without significant hematoma or seroma.    Pathology:  A) BREAST, RIGHT, LUMPECTOMY:  -INVASIVE DUCTAL CARCINOMA, VALERIA GRADE 2  -TUMOR SIZE 5.3 x 5 MM   -MARGINS NEGATIVE FOR CARCINOMA (CLOSEST MARGIN, ANTERIOR AT LESS THAN 1 MM; NEXT CLOSEST MARGIN 7 MM, INFERIOR; GREATER THAN 10 MM REMAINING MARGINS)  -FOCAL DUCTAL CARCINOMA IN SITU, SOLID PAPILLARY TYPE, EIC NEGATIVE  -MARGINS NEGATIVE FOR DCIS (CLOSEST MARGIN, ANTERIOR AT 1 MM; NEXT CLOSEST MARGIN 9 MM, INFERIOR; GREATER THAN 10 MM REMAINING MARGINS)  -PREVIOUS BIOPSY SITE PRESENT  -COLUMNAR CELL CHANGE, USUAL AND FLORID DUCTAL HYPERPLASIA, NON-PROLIFERATIVE FIBROCYSTIC CHANGE  -ER/LA/HER2 IMMUNOHISTOCHEMICAL STAINS ARE PENDING ON BLOCK A23 AND RESULTS WILL BE REPORTED IN AN ADDENDUM  -SEE SYNOPTIC REPORT  -SEE COMMENT     B) SENTINEL LYMPH NODE, RIGHT #1, BIOPSY:  -ONE LYMPH NODE NEGATIVE FOR METASTATIC CARCINOMA (0/1)     C) SENTINEL LYMPH NODE, \"RIGHT\" #2, BIOPSY:  -ONE LYMPH NODE NEGATIVE FOR METASTATIC CARCINOMA (0/1)  -SEE COMMENT    ASSESSMENT:  Nia Prieto is s/p right lumpectomy with SLN for an invasive ductal carcinoma    - Grade II, T1, N0, ER/LA+, HER2- --> pathologic prognostic stage IA    PLAN:  Okay for all activities as tolerated.  Pathology was " discussed.  Referral placed for radiation oncology.  She will be following back up with Dr. Herrera on June 22.  Continue with yearly mammograms.  150 minutes of aerobic exercise/week with 2 days of strength training is recommended     - This can improve survival and decrease recurrence risk  Return to the breast clinic in 1 year, or earlier as needed    Lillian Marie DO  General Surgeon  [facility]  Breast Center [address] [address]  Office: [phone]  Employed by - [facility]        Again, thank you for allowing me to participate in the care of your patient.        Sincerely,        Lillian Marie, DO

## 2023-06-21 ENCOUNTER — PATIENT OUTREACH (OUTPATIENT)
Dept: ONCOLOGY | Facility: CLINIC | Age: 73
End: 2023-06-21
Payer: MEDICARE

## 2023-06-21 NOTE — PROGRESS NOTES
New Patient Oncology Nurse Navigator Note     Referring provider: Dr. Lillian Marie     Referring Clinic/Organization: [facility] Surgery     Referred to (specialty:) Radiation Oncology      Date Referral Received: June 14, 2023  Original order entered for [place] and not indicated until [place] staff called patient that she prefers [facility]     Evaluation for:  Breast cancer     Clinical History (per Nurse review of records provided):      Nia Prieto is s/p right lumpectomy with SLN for an invasive ductal carcinoma    - Grade II, T1, N0, ER/RI+, HER2- --> pathologic prognostic stage IA     Records Location: See Bookmarked material     Follow up appointment with Dr. Herrera on 6/22.   Surgery on 6/8/23.    Writer received referral, reviewed for appropriate plan, and referral transferred to New Patient Scheduling for completion.

## 2023-06-22 ENCOUNTER — ONCOLOGY VISIT (OUTPATIENT)
Dept: ONCOLOGY | Facility: CLINIC | Age: 73
End: 2023-06-22
Attending: INTERNAL MEDICINE
Payer: MEDICARE

## 2023-06-22 VITALS
HEART RATE: 93 BPM | OXYGEN SATURATION: 97 % | SYSTOLIC BLOOD PRESSURE: 128 MMHG | DIASTOLIC BLOOD PRESSURE: 88 MMHG | BODY MASS INDEX: 28.36 KG/M2 | TEMPERATURE: 98.3 F | RESPIRATION RATE: 16 BRPM | WEIGHT: 165.2 LBS

## 2023-06-22 DIAGNOSIS — C50.911 INVASIVE DUCTAL CARCINOMA OF BREAST, STAGE 1, RIGHT (H): Primary | ICD-10-CM

## 2023-06-22 PROCEDURE — 99214 OFFICE O/P EST MOD 30 MIN: CPT | Performed by: INTERNAL MEDICINE

## 2023-06-22 PROCEDURE — G0463 HOSPITAL OUTPT CLINIC VISIT: HCPCS | Performed by: INTERNAL MEDICINE

## 2023-06-22 RX ORDER — ANASTROZOLE 1 MG/1
1 TABLET ORAL DAILY
Qty: 30 TABLET | Refills: 1 | Status: SHIPPED | OUTPATIENT
Start: 2023-06-22 | End: 2023-10-02

## 2023-06-22 ASSESSMENT — PAIN SCALES - GENERAL: PAINLEVEL: NO PAIN (1)

## 2023-06-22 NOTE — PROGRESS NOTES
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 Invasive ductal carcinoma of the upper outer quadrant of the right breast.  Grade 2.  ER positive, MD positive, HER2 negative.    #.  Low bone density     Reviewed the pathology result in detail with the patient.  She has stage I invasive breast cancer.  Her tumor is just above 5 mm in size.  Histologic features did not show any high risk histologic features.  I do not think Oncotype DX will add any benefit in her care.  She will not consider chemotherapy either.  I recommended adjuvant endocrine therapy for hormone receptor positive, HER2 negative invasive breast cancer management.  She agreed.  I discussed anastrozole information, schedule and duration of therapy for 5 years.  I advised her to start anastrozole about 1-2 weeks after completion of radiation.   I advised her to continue to calcium/vitamin D and weightbearing exercises.  We will continue to monitor her bone density scan closely while she is on anastrozole/aromatase inhibitor therapy.   She is going to meet with radiation oncologist next week to determine risk, benefits and schedule of radiation therapy.   Follow-up with me about 2-3 months after starting anastrozole.    Encounter Diagnoses:    Problem List Items Addressed This Visit    None  Visit Diagnoses     Invasive ductal carcinoma of breast,  stage 1, right (H)    -  Primary    Relevant Medications    anastrozole (ARIMIDEX) 1 MG tablet             CC: Sammie Pérez MD   ______________________________________________________________________________  Diagnosis/ Treatment to date  6/2023- Invasive ductal carcinoma of the upper outer quadrant of the right breast.    Grade 2.   5.3 x 5 mm, Negative margins   There is associated DCIS with close margin.   pT1b (sn)N0   ER positive (%), DE positive (%), HER2 negative (1+ by IHC).      6/8/2023- right breast lumpectomy and right axillary sentinel lymph node biopsy (Dr. Marie)  7/2023-anticipate to start adjuvant radiation followed by adjuvant anastrozole.    History of Present Illness    Ms. Nia Prieto presented today for follow-up after surgery.  She has recovering from surgery expectantly.  She does not have any concerns today.    Review of systems  Apart from describing in HPI, the remainder of comprehensive ROS was negative.    Past History    Past Medical History:   Diagnosis Date     Diabetes (H)      Ductal carcinoma in situ (DCIS) of breast with microinvasive component (H)     right     Eczema      Hypertension      Seasonal allergic rhinitis        Past Surgical History:   Procedure Laterality Date     CATARACT EXTRACTION Bilateral      COLONOSCOPY       EYE SURGERY       LAPAROSCOPIC CHOLECYSTECTOMY N/A 7/5/2023    Procedure: CHOLECYSTECTOMY, LAPAROSCOPIC;  Surgeon: Giorgi Stephens MD;  Location: [facility] OR     LUMPECTOMY BREAST Right 6/8/2023    Procedure: BREAST TAG LOCALIZED LUMPECTOMY WITH SENTINEL LYMPH NODE BIOPSY;  Surgeon: Lillian Marie DO;  Location: [facility]       Physical Exam    /88   Pulse 93   Temp 98.3  F (36.8  C)   Resp 16   Wt 74.9 kg (165 lb 3.2 oz)   SpO2 97%   BMI 28.36 kg/m      General: alert, awake, not in acute distress  HEENT: Head: Normal, normocephalic, atraumatic.  Eye: Normal external eye, conjunctiva, lids cornea,  MOY.  Neck / Thyroid: Supple, no masses, nodes, nodules or enlargement.  Lymphatics: No abnormally enlarged lymph nodes.  Chest: Normal chest wall and respirations. Clear to auscultation.  Breasts: Healing postlumpectomy scar in the right breast.  Heart: S1 S2 RRR, no murmur.   Abdomen: abdomen is soft without significant tenderness, masses, organomegaly or guarding  Extremities: normal strength, tone, and muscle mass  Skin: normal. no rash or abnormalities  CNS: non focal.    Lab Results    Recent Results (from the past 168 hour(s))   ECG 12-LEAD WITH MUSE (LHE)   Result Value Ref Range    Systolic Blood Pressure 117 mmHg    Diastolic Blood Pressure 67 mmHg    Ventricular Rate 80 BPM    Atrial Rate 80 BPM    NE Interval 120 ms    QRS Duration 90 ms     ms    QTc 442 ms    P Axis 60 degrees    R AXIS 55 degrees    T Axis 57 degrees    Interpretation ECG       Sinus rhythm  Normal ECG  No previous ECGs available  Confirmed by SEE ED PROVIDER NOTE FOR, ECG INTERPRETATION ([id number]),  PETEY BROWN ([id number]) on 7/5/2023 7:57:36 AM     Comprehensive metabolic panel   Result Value Ref Range    Sodium 135 (L) 136 - 145 mmol/L    Potassium 4.4 3.5 - 5.0 mmol/L    Chloride 101 98 - 107 mmol/L    Carbon Dioxide (CO2) 24 22 - 31 mmol/L    Anion Gap 10 5 - 18 mmol/L    Urea Nitrogen 10 8 - 28 mg/dL    Creatinine 0.72 0.60 - 1.10 mg/dL    Calcium 9.9 8.5 - 10.5 mg/dL    Glucose 157 (H) 70 - 125 mg/dL    Alkaline Phosphatase 57 45 - 120 U/L    AST 12 0 - 40 U/L    ALT <9 0 - 45 U/L    Protein Total 7.5 6.0 - 8.0 g/dL    Albumin 4.0 3.5 - 5.0 g/dL    Bilirubin Total 0.5 0.0 - 1.0 mg/dL    GFR Estimate 89 >60 mL/min/1.73m2   Lipase   Result Value Ref Range    Lipase 11 0 - 52 U/L   Troponin I   Result Value Ref Range    Troponin I <0.01 0.00 - 0.29 ng/mL   CBC with platelets and differential   Result Value Ref Range    WBC Count 14.3 (H) 4.0 - 11.0 10e3/uL    RBC Count 4.68 3.80 - 5.20 10e6/uL    Hemoglobin 13.6 11.7 -  15.7 g/dL    Hematocrit 42.2 35.0 - 47.0 %    MCV 90 78 - 100 fL    MCH 29.1 26.5 - 33.0 pg    MCHC 32.2 31.5 - 36.5 g/dL    RDW 13.2 10.0 - 15.0 %    Platelet Count 254 150 - 450 10e3/uL    % Neutrophils 87 %    % Lymphocytes 4 %    % Monocytes 9 %    % Eosinophils 0 %    % Basophils 0 %    % Immature Granulocytes 0 %    NRBCs per 100 WBC 0 <1 /100    Absolute Neutrophils 12.3 (H) 1.6 - 8.3 10e3/uL    Absolute Lymphocytes 0.6 (L) 0.8 - 5.3 10e3/uL    Absolute Monocytes 1.3 0.0 - 1.3 10e3/uL    Absolute Eosinophils 0.0 0.0 - 0.7 10e3/uL    Absolute Basophils 0.0 0.0 - 0.2 10e3/uL    Absolute Immature Granulocytes 0.1 <=0.4 10e3/uL    Absolute NRBCs 0.0 10e3/uL   Comprehensive metabolic panel   Result Value Ref Range    Sodium 138 136 - 145 mmol/L    Potassium 4.6 3.5 - 5.0 mmol/L    Chloride 103 98 - 107 mmol/L    Carbon Dioxide (CO2) 25 22 - 31 mmol/L    Anion Gap 10 5 - 18 mmol/L    Urea Nitrogen 11 8 - 28 mg/dL    Creatinine 0.67 0.60 - 1.10 mg/dL    Calcium 9.6 8.5 - 10.5 mg/dL    Glucose 120 70 - 125 mg/dL    Alkaline Phosphatase 51 45 - 120 U/L    AST 35 0 - 40 U/L    ALT 18 0 - 45 U/L    Protein Total 6.4 6.0 - 8.0 g/dL    Albumin 3.0 (L) 3.5 - 5.0 g/dL    Bilirubin Total 0.4 0.0 - 1.0 mg/dL    GFR Estimate >90 >60 mL/min/1.73m2   CBC with platelets   Result Value Ref Range    WBC Count 12.1 (H) 4.0 - 11.0 10e3/uL    RBC Count 4.30 3.80 - 5.20 10e6/uL    Hemoglobin 12.5 11.7 - 15.7 g/dL    Hematocrit 38.5 35.0 - 47.0 %    MCV 90 78 - 100 fL    MCH 29.1 26.5 - 33.0 pg    MCHC 32.5 31.5 - 36.5 g/dL    RDW 13.4 10.0 - 15.0 %    Platelet Count 219 150 - 450 10e3/uL       Imaging    US Abdomen Limited (RUQ)    Result Date: 7/5/2023  EXAM: US ABDOMEN LIMITED LOCATION: [facility] DATE: 7/5/2023 INDICATION: ? cholecystitis.   abd pain COMPARISON: None. TECHNIQUE: Limited abdominal ultrasound. FINDINGS: GALLBLADDER: Multiple gallstones and sludge are seen within the gallbladder with a  positive sonographic Gould's sign reported during the exam. Pericholecystic fluid and gallbladder wall thickening are noted, with the gallbladder wall measuring 6 mm in diameter. BILE DUCTS: No biliary dilatation. The common duct measures 4 mm. LIVER: Normal parenchyma with smooth contour. No focal mass. RIGHT KIDNEY: No hydronephrosis. PANCREAS: The visualized portions are normal. No ascites.     IMPRESSION: 1.  Cholelithiasis with gallbladder wall thickening up to 6 mm in diameter, pericholecystic fluid and a positive sonographic Gould's sign reported during the exam, consistent with acute cholecystitis.     CTA Chest Abdomen Pelvis w Contrast    Result Date: 7/5/2023  EXAM: CTA CHEST ABDOMEN PELVIS W CONTRAST LOCATION: [facility] DATE: 7/5/2023 INDICATION: Chest pain. Evaluate for dissection. COMPARISON: Chest x-ray 2 views 11/08/2018 at 1539 hours. TECHNIQUE: CT angiogram chest abdomen pelvis during arterial phase of injection of IV contrast. 2D and 3D MIP reconstructions were performed by the CT technologist. Dose reduction techniques were used. CONTRAST: 90 mL Isovue 370 IV. FINDINGS: CT ANGIOGRAM CHEST, ABDOMEN, AND PELVIS: Mildly atherosclerotic thoracic arch. Ectatic ascending thoracic segment measuring 3.8 x 3.8 cm (image 39, series 6). No aneurysm or dissection in the chest, abdomen and pelvis. Normal caliber great vessels arising from the thoracic arch. Mesenteric and bilateral renal arteries are widely patent. Common iliac arteries and downstream branches are widely patent to the lesser trochanters. No pulmonary emboli on either side. LUNGS AND PLEURA: Mild scarring in the apices. Tiny calcified granuloma right upper lobe posteriorly. Mild diffuse thickening of the bronchi. Subtle mosaic attenuation bilaterally suggests small airways or small vessel disease. No pleural effusion on either side. MEDIASTINUM/AXILLAE: Normal cardiac size. No pericardial effusion. No suspicious  adenopathy. Moderate hiatal hernia behind the heart. Trachea is midline. CORONARY ARTERY CALCIFICATION: None. HEPATOBILIARY: Multiple gallstones within the gallbladder which is distended. Mild wall thickening and adjacent soft tissue stranding, worrisome for ongoing acute cholecystitis. Recommend targeted ultrasound of the gallbladder for further assessment. Diffusely fatty infiltrated liver without discrete lesion. Small amount of perihepatic ascites. PANCREAS: Normal. SPLEEN: Normal. ADRENAL GLANDS: Normal. KIDNEYS/BLADDER: No urinary tract calculi. Both kidneys are well perfused without hydronephrosis or hydroureter. Partially distended normal urinary bladder. BOWEL: Colonic diverticulosis, more apparent distally at the redundant rectosigmoid, without acute inflammation. Normal appendix. Moderate hiatal hernia. No mechanical bowel obstruction or free gas. LYMPH NODES: No suspicious abdominopelvic adenopathy. PELVIC ORGANS: Retroverted fibroid uterus. No adenopathy or free fluid. Rectosigmoid diverticulosis. Normal appendix. MUSCULOSKELETAL: Mild degenerative changes both shoulders, spine and joints of the pelvis. Mild right convex thoracic curve.     IMPRESSION: 1.  Mildly atherosclerotic thoracic arch. Ectatic ascending segment. No aortic aneurysm or dissection in the chest, abdomen and pelvis. Downstream branches are widely patent. 2.  No pulmonary emboli on either side. Mild scarring in the apices. Evidence of remote granulomatous disease. Mild diffuse thickening of the bronchi. Subtle mosaic attenuation bilaterally suggests small airways or small vessel disease. No adenopathy or effusion. 3.  The gallbladder is distended containing multiple stones. Wall thickening and adjacent soft tissue stranding, worrisome for ongoing acute cholecystitis. Recommend targeted ultrasound of the gallbladder for further assessment. Diffusely fatty infiltrated liver without discrete lesion. Small amount of perihepatic ascites.  4.  Colonic diverticulosis, more apparent distally at the rectosigmoid, without acute inflammation, mechanical obstruction, free gas or free fluid. Moderate hiatal hernia. Normal appendix. 5.  No urinary tract calculi or obstruction. Retroverted fibroid uterus. 6.  Mild degenerative changes both shoulders, spine and joints of the pelvis. Mild right convex thoracic curve. NOTE: ABNORMAL REPORT THE DICTATION ABOVE DESCRIBES AN ABNORMALITY FOR WHICH FOLLOW-UP IS NEEDED.     30 minutes spent on the date of the encounter doing chart review, history and exam, documentation, communication of the treatment plan with the care team and further activities as noted above.    Signed by: Lennie Herrera MD

## 2023-06-22 NOTE — PROGRESS NOTES
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    Weight as of this encounter: 74.9 kg (165 lb 3.2 oz). Body surface area is 1.84 meters squared.  No Pain (1) Comment: Data Unavailable   No LMP recorded. Patient is postmenopausal.  Allergies reviewed: Yes  Medications reviewed: Yes    Medications: Medication refills not needed today.  Pharmacy name entered into [facility]: [id number] [place] [address]    Clinical concerns: BP high with machine. Lower with manual.      Radha Berman RN              "

## 2023-06-22 NOTE — LETTER
"    6/22/2023         RE: Nia Prieto  [address]  [facility] [address]        Dear Colleague,    Thank you for referring your patient, Nia Prieto, to the [facility]. Please see a copy of my visit note below.    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    Weight as of this encounter: 74.9 kg (165 lb 3.2 oz). Body surface area is 1.84 meters squared.  No Pain (1) Comment: Data Unavailable   No LMP recorded. Patient is postmenopausal.  Allergies reviewed: Yes  Medications reviewed: Yes    Medications: Medication refills not needed today.  Pharmacy name entered into [employer]: [id number] [place] [facility] [place], [place] - [address]    Clinical concerns: BP high with machine. Lower with manual.      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 Invasive ductal carcinoma of the upper outer quadrant of the right breast.  Grade 2.  ER positive, UT positive, HER2 negative.    #.  Low bone density     Reviewed the pathology result in detail with the patient.  She has stage I invasive breast cancer.  Her tumor is just above 5 mm in size.  Histologic features did not show any high risk histologic features.  I do not think Oncotype DX will add any benefit in her care.  She will not consider chemotherapy either.  I recommended adjuvant endocrine therapy for hormone receptor positive, HER2 negative invasive breast cancer management.  She agreed.  I discussed anastrozole information, schedule and duration of therapy for 5 years.  I advised her to start anastrozole about 1-2 weeks after completion of radiation.   I advised her to continue to calcium/vitamin D and weightbearing exercises.  We will continue to monitor her bone density scan closely while she is on anastrozole/aromatase inhibitor therapy.   She is going to meet with radiation oncologist next week to determine risk, benefits and schedule of radiation therapy.   Follow-up with me about 2-3 months after starting anastrozole.    Encounter Diagnoses:    Problem List Items Addressed This Visit    None  Visit Diagnoses     Invasive ductal carcinoma of breast, stage 1, right (H)    -  Primary    Relevant Medications    anastrozole (ARIMIDEX) 1 MG tablet             CC: Sammie Pérez MD   ______________________________________________________________________________  Diagnosis/ Treatment to date  6/2023- Invasive ductal carcinoma of the upper outer quadrant of the right breast.    Grade 2.   5.3 x 5 mm, Negative margins   There is associated DCIS with close margin.   pT1b (sn)N0   ER positive (%), UT positive (%), HER2 negative (1+ by IHC).      6/8/2023- right breast lumpectomy and right axillary sentinel lymph node biopsy (Dr. Marie)  7/2023-anticipate to start adjuvant radiation  followed by adjuvant anastrozole.    History of Present Illness    Ms. Nia Prieto presented today for follow-up after surgery.  She has recovering from surgery expectantly.  She does not have any concerns today.    Review of systems  Apart from describing in HPI, the remainder of comprehensive ROS was negative.    Past History    Past Medical History:   Diagnosis Date     Diabetes (H)      Ductal carcinoma in situ (DCIS) of breast with microinvasive component (H)     right     Eczema      Hypertension      Seasonal allergic rhinitis        Past Surgical History:   Procedure Laterality Date     CATARACT EXTRACTION Bilateral      COLONOSCOPY       EYE SURGERY       LAPAROSCOPIC CHOLECYSTECTOMY N/A 7/5/2023    Procedure: CHOLECYSTECTOMY, LAPAROSCOPIC;  Surgeon: Giorgi Stephens MD;  Location: [facility] OR     LUMPECTOMY BREAST Right 6/8/2023    Procedure: BREAST TAG LOCALIZED LUMPECTOMY WITH SENTINEL LYMPH NODE BIOPSY;  Surgeon: Lillian Marie DO;  Location: [facility]       Physical Exam    /88   Pulse 93   Temp 98.3  F (36.8  C)   Resp 16   Wt 74.9 kg (165 lb 3.2 oz)   SpO2 97%   BMI 28.36 kg/m      General: alert, awake, not in acute distress  HEENT: Head: Normal, normocephalic, atraumatic.  Eye: Normal external eye, conjunctiva, lids cornea, MOY.  Neck / Thyroid: Supple, no masses, nodes, nodules or enlargement.  Lymphatics: No abnormally enlarged lymph nodes.  Chest: Normal chest wall and respirations. Clear to auscultation.  Breasts: Healing postlumpectomy scar in the right breast.  Heart: S1 S2 RRR, no murmur.   Abdomen: abdomen is soft without significant tenderness, masses, organomegaly or guarding  Extremities: normal strength, tone, and muscle mass  Skin: normal. no rash or abnormalities  CNS: non focal.    Lab Results    Recent Results (from the past 168 hour(s))   ECG 12-LEAD WITH MUSE (LHE)   Result Value Ref Range    Systolic Blood Pressure 117 mmHg    Diastolic Blood  Pressure 67 mmHg    Ventricular Rate 80 BPM    Atrial Rate 80 BPM    SD Interval 120 ms    QRS Duration 90 ms     ms    QTc 442 ms    P Axis 60 degrees    R AXIS 55 degrees    T Axis 57 degrees    Interpretation ECG       Sinus rhythm  Normal ECG  No previous ECGs available  Confirmed by SEE ED PROVIDER NOTE FOR, ECG INTERPRETATION ([id number]),  RAJANIPETEY PRITCHARD ([id number]) on 7/5/2023 7:57:36 AM     Comprehensive metabolic panel   Result Value Ref Range    Sodium 135 (L) 136 - 145 mmol/L    Potassium 4.4 3.5 - 5.0 mmol/L    Chloride 101 98 - 107 mmol/L    Carbon Dioxide (CO2) 24 22 - 31 mmol/L    Anion Gap 10 5 - 18 mmol/L    Urea Nitrogen 10 8 - 28 mg/dL    Creatinine 0.72 0.60 - 1.10 mg/dL    Calcium 9.9 8.5 - 10.5 mg/dL    Glucose 157 (H) 70 - 125 mg/dL    Alkaline Phosphatase 57 45 - 120 U/L    AST 12 0 - 40 U/L    ALT <9 0 - 45 U/L    Protein Total 7.5 6.0 - 8.0 g/dL    Albumin 4.0 3.5 - 5.0 g/dL    Bilirubin Total 0.5 0.0 - 1.0 mg/dL    GFR Estimate 89 >60 mL/min/1.73m2   Lipase   Result Value Ref Range    Lipase 11 0 - 52 U/L   Troponin I   Result Value Ref Range    Troponin I <0.01 0.00 - 0.29 ng/mL   CBC with platelets and differential   Result Value Ref Range    WBC Count 14.3 (H) 4.0 - 11.0 10e3/uL    RBC Count 4.68 3.80 - 5.20 10e6/uL    Hemoglobin 13.6 11.7 - 15.7 g/dL    Hematocrit 42.2 35.0 - 47.0 %    MCV 90 78 - 100 fL    MCH 29.1 26.5 - 33.0 pg    MCHC 32.2 31.5 - 36.5 g/dL    RDW 13.2 10.0 - 15.0 %    Platelet Count 254 150 - 450 10e3/uL    % Neutrophils 87 %    % Lymphocytes 4 %    % Monocytes 9 %    % Eosinophils 0 %    % Basophils 0 %    % Immature Granulocytes 0 %    NRBCs per 100 WBC 0 <1 /100    Absolute Neutrophils 12.3 (H) 1.6 - 8.3 10e3/uL    Absolute Lymphocytes 0.6 (L) 0.8 - 5.3 10e3/uL    Absolute Monocytes 1.3 0.0 - 1.3 10e3/uL    Absolute Eosinophils 0.0 0.0 - 0.7 10e3/uL    Absolute Basophils 0.0 0.0 - 0.2 10e3/uL    Absolute Immature Granulocytes 0.1 <=0.4 10e3/uL    Absolute  NRBCs 0.0 10e3/uL   Comprehensive metabolic panel   Result Value Ref Range    Sodium 138 136 - 145 mmol/L    Potassium 4.6 3.5 - 5.0 mmol/L    Chloride 103 98 - 107 mmol/L    Carbon Dioxide (CO2) 25 22 - 31 mmol/L    Anion Gap 10 5 - 18 mmol/L    Urea Nitrogen 11 8 - 28 mg/dL    Creatinine 0.67 0.60 - 1.10 mg/dL    Calcium 9.6 8.5 - 10.5 mg/dL    Glucose 120 70 - 125 mg/dL    Alkaline Phosphatase 51 45 - 120 U/L    AST 35 0 - 40 U/L    ALT 18 0 - 45 U/L    Protein Total 6.4 6.0 - 8.0 g/dL    Albumin 3.0 (L) 3.5 - 5.0 g/dL    Bilirubin Total 0.4 0.0 - 1.0 mg/dL    GFR Estimate >90 >60 mL/min/1.73m2   CBC with platelets   Result Value Ref Range    WBC Count 12.1 (H) 4.0 - 11.0 10e3/uL    RBC Count 4.30 3.80 - 5.20 10e6/uL    Hemoglobin 12.5 11.7 - 15.7 g/dL    Hematocrit 38.5 35.0 - 47.0 %    MCV 90 78 - 100 fL    MCH 29.1 26.5 - 33.0 pg    MCHC 32.5 31.5 - 36.5 g/dL    RDW 13.4 10.0 - 15.0 %    Platelet Count 219 150 - 450 10e3/uL       Imaging    US Abdomen Limited (RUQ)    Result Date: 7/5/2023  EXAM: US ABDOMEN LIMITED LOCATION: [facility] DATE: 7/5/2023 INDICATION: ? cholecystitis.   abd pain COMPARISON: None. TECHNIQUE: Limited abdominal ultrasound. FINDINGS: GALLBLADDER: Multiple gallstones and sludge are seen within the gallbladder with a positive sonographic Gould's sign reported during the exam. Pericholecystic fluid and gallbladder wall thickening are noted, with the gallbladder wall measuring 6 mm in diameter. BILE DUCTS: No biliary dilatation. The common duct measures 4 mm. LIVER: Normal parenchyma with smooth contour. No focal mass. RIGHT KIDNEY: No hydronephrosis. PANCREAS: The visualized portions are normal. No ascites.     IMPRESSION: 1.  Cholelithiasis with gallbladder wall thickening up to 6 mm in diameter, pericholecystic fluid and a positive sonographic Gould's sign reported during the exam, consistent with acute cholecystitis.     CTA Chest Abdomen Pelvis w  Contrast    Result Date: 7/5/2023  EXAM: CTA CHEST ABDOMEN PELVIS W CONTRAST LOCATION: [facility] DATE: 7/5/2023 INDICATION: Chest pain. Evaluate for dissection. COMPARISON: Chest x-ray 2 views 11/08/2018 at 1539 hours. TECHNIQUE: CT angiogram chest abdomen pelvis during arterial phase of injection of IV contrast. 2D and 3D MIP reconstructions were performed by the CT technologist. Dose reduction techniques were used. CONTRAST: 90 mL Isovue 370 IV. FINDINGS: CT ANGIOGRAM CHEST, ABDOMEN, AND PELVIS: Mildly atherosclerotic thoracic arch. Ectatic ascending thoracic segment measuring 3.8 x 3.8 cm (image 39, series 6). No aneurysm or dissection in the chest, abdomen and pelvis. Normal caliber great vessels arising from the thoracic arch. Mesenteric and bilateral renal arteries are widely patent. Common iliac arteries and downstream branches are widely patent to the lesser trochanters. No pulmonary emboli on either side. LUNGS AND PLEURA: Mild scarring in the apices. Tiny calcified granuloma right upper lobe posteriorly. Mild diffuse thickening of the bronchi. Subtle mosaic attenuation bilaterally suggests small airways or small vessel disease. No pleural effusion on either side. MEDIASTINUM/AXILLAE: Normal cardiac size. No pericardial effusion. No suspicious adenopathy. Moderate hiatal hernia behind the heart. Trachea is midline. CORONARY ARTERY CALCIFICATION: None. HEPATOBILIARY: Multiple gallstones within the gallbladder which is distended. Mild wall thickening and adjacent soft tissue stranding, worrisome for ongoing acute cholecystitis. Recommend targeted ultrasound of the gallbladder for further assessment. Diffusely fatty infiltrated liver without discrete lesion. Small amount of perihepatic ascites. PANCREAS: Normal. SPLEEN: Normal. ADRENAL GLANDS: Normal. KIDNEYS/BLADDER: No urinary tract calculi. Both kidneys are well perfused without hydronephrosis or hydroureter. Partially distended  normal urinary bladder. BOWEL: Colonic diverticulosis, more apparent distally at the redundant rectosigmoid, without acute inflammation. Normal appendix. Moderate hiatal hernia. No mechanical bowel obstruction or free gas. LYMPH NODES: No suspicious abdominopelvic adenopathy. PELVIC ORGANS: Retroverted fibroid uterus. No adenopathy or free fluid. Rectosigmoid diverticulosis. Normal appendix. MUSCULOSKELETAL: Mild degenerative changes both shoulders, spine and joints of the pelvis. Mild right convex thoracic curve.     IMPRESSION: 1.  Mildly atherosclerotic thoracic arch. Ectatic ascending segment. No aortic aneurysm or dissection in the chest, abdomen and pelvis. Downstream branches are widely patent. 2.  No pulmonary emboli on either side. Mild scarring in the apices. Evidence of remote granulomatous disease. Mild diffuse thickening of the bronchi. Subtle mosaic attenuation bilaterally suggests small airways or small vessel disease. No adenopathy or effusion. 3.  The gallbladder is distended containing multiple stones. Wall thickening and adjacent soft tissue stranding, worrisome for ongoing acute cholecystitis. Recommend targeted ultrasound of the gallbladder for further assessment. Diffusely fatty infiltrated liver without discrete lesion. Small amount of perihepatic ascites. 4.  Colonic diverticulosis, more apparent distally at the rectosigmoid, without acute inflammation, mechanical obstruction, free gas or free fluid. Moderate hiatal hernia. Normal appendix. 5.  No urinary tract calculi or obstruction. Retroverted fibroid uterus. 6.  Mild degenerative changes both shoulders, spine and joints of the pelvis. Mild right convex thoracic curve. NOTE: ABNORMAL REPORT THE DICTATION ABOVE DESCRIBES AN ABNORMALITY FOR WHICH FOLLOW-UP IS NEEDED.     30 minutes spent on the date of the encounter doing chart review, history and exam, documentation, communication of the treatment plan with the care team and further  activities as noted above.    Signed by: Lennie Herrera MD          Again, thank you for allowing me to participate in the care of your patient.        Sincerely,        Lennie Herrera MD

## 2023-06-22 NOTE — TELEPHONE ENCOUNTER
MEDICAL RECORDS REQUEST   Radiation Oncology  [address] [place] [address]  Fax: [phone]    Action 2023 10:32 AM    Action Taken Spoke to patient, no previous radiation therapy done before.            FUTURE VISIT INFORMATION                                                   Nia Prieto, : 1950 scheduled for future visit at [facility] Radiation Oncology    RECORDS REQUESTED FOR VISIT                                                     BREAST     OFFICE NOTE from medical oncologist [facility] 23: Dr. Lennie Hogue Thaw   OFFICE NOTE from surgeon/plastic surgeon [facility] 23: Dr. Lillian Marie   OPERATIVE/BREAST BIOPSY REPORTS Epic 23: Lumpectomy Right Breast Biopsy  23: US Right Breast Biopsy Core Needle   MEDICATION LIST [facility]    LABS     PATHOLOGY REPORTS [facility] 23: [id number]  23: [id number]    Gene testin23: [id number]   ANYTHING RELATED TO DIAGNOSIS Epic Most recent from 23   IMAGING (NEED IMAGES & REPORT)     MAMMO/SURGICAL BREAST IMGS/SPECIMEN RADIOGRAPH PACS 23-3/10/15   ULTRASOUND PACS 23-3/13/15: US Breast   BONE SCANS PACS 3/20/23: DX Hip/Pelvis/Spine

## 2023-06-27 ENCOUNTER — PRE VISIT (OUTPATIENT)
Dept: RADIATION ONCOLOGY | Facility: CLINIC | Age: 73
End: 2023-06-27
Payer: MEDICARE

## 2023-06-27 ENCOUNTER — OFFICE VISIT (OUTPATIENT)
Dept: RADIATION ONCOLOGY | Facility: CLINIC | Age: 73
End: 2023-06-27
Attending: SURGERY
Payer: MEDICARE

## 2023-06-27 VITALS
RESPIRATION RATE: 16 BRPM | SYSTOLIC BLOOD PRESSURE: 138 MMHG | OXYGEN SATURATION: 98 % | TEMPERATURE: 97.8 F | DIASTOLIC BLOOD PRESSURE: 87 MMHG | WEIGHT: 164.8 LBS | HEART RATE: 94 BPM | BODY MASS INDEX: 28.29 KG/M2

## 2023-06-27 DIAGNOSIS — C50.911 INVASIVE DUCTAL CARCINOMA OF BREAST, RIGHT (H): ICD-10-CM

## 2023-06-27 PROCEDURE — 99205 OFFICE O/P NEW HI 60 MIN: CPT | Mod: 25 | Performed by: RADIOLOGY

## 2023-06-27 PROCEDURE — G0463 HOSPITAL OUTPT CLINIC VISIT: HCPCS | Performed by: RADIOLOGY

## 2023-06-27 PROCEDURE — 77263 THER RADIOLOGY TX PLNG CPLX: CPT | Performed by: RADIOLOGY

## 2023-06-27 NOTE — PROGRESS NOTES
[facility] Radiation Oncology Consult Note     Patient: Nia Prieto  MRN: [id number]  Date of Service: 06/27/2023          Lillian Marie DO  [address] [id number]       Dear Dr. Marie:    Thank you very much for referring this patient for consideration of radiotherapy. As you know Ms. Prieto is a 72 year old female with a diagnosis of right breast cancer, pathologic stage T1bN0(sn)M0, ER/NY positive, HER2 negative, status post lumpectomy and sentinel lymph node resection with negative margin and closest margin measured less than 1 mm anterior for invasive cancer and DCIS.  The patient is referred to radiation oncology for evaluation and discussion of possible postop radiation therapy.    HISTORY OF PRESENT ILLNESS:   Ms. Prieto is a 72 year old female who has been in her usual state of health until recently.  The patient presented with abnormal finding by routine screening mammogram for which she was a seeking further evaluation.  The mammogram followed by ultrasound reviewed a 0.6 x 0.5 x 0.8 cm irregular hypoechoic mass at 11 o'clock position 4 cm from nipple of right breast suspicious for malignancy.  Patient underwent ultrasound-guided needle biopsy on 4/20/2023.  The pathology reviewed microinvasive ductal carcinoma less than 1 mm with DCIS.  ER/NY's are positive and HER2 negative.  Patient underwent lumpectomy and sentinel lymph node resection on 6/8/2023.  The pathology reviewed 5.3 x 5 mm invasive ductal carcinoma, Crescencio grade 2 with associated DCIS.  Margins are negative with closest margin measured less than 1 mm anterior for both invasive cancer and DCIS.  2 removed sentinel lymph node showed no evidence of metastatic disease.  The patient has been recovering well since the surgery.  She saw Dr. Herrera, medical oncology on 6/22/2023 and adjuvant hormone therapy was recommended.  Patient is here today for evaluation and discussion of possible postop radiation  therapy.    CHEMOTHERAPY HISTORY: Concurrent Chemotherapy: No    RADIATION THERAPY HISTORY: Prior Radiation: No    IMPLANTED CARDIAC DEVICE: none     PREGNANCY: The patient is informed not to be pregnant during the radiation therapy.  She is post menopausal.    Current Outpatient Medications   Medication Sig Dispense Refill     cetirizine (ZYRTEC) 10 MG tablet Take 10 mg by mouth daily       L. rhamnosus GG/inulin (CULTURELLE PROBIOTICS ORAL)        anastrozole (ARIMIDEX) 1 MG tablet Take 1 tablet (1 mg) by mouth daily (Patient not taking: Reported on 6/27/2023) 30 tablet 1     hydrocortisone (WESTCORT) 0.2 % external cream Apply topically 2 times daily as needed (eczema) (Patient not taking: Reported on 6/22/2023) 30 g 1     Past Medical History:   Diagnosis Date     Diabetes (H)      Ductal carcinoma in situ (DCIS) of breast with microinvasive component (H)     right     Eczema      Hypertension      Seasonal allergic rhinitis      Past Surgical History:   Procedure Laterality Date     CATARACT EXTRACTION Bilateral      COLONOSCOPY       EYE SURGERY       LUMPECTOMY BREAST Right 6/8/2023    Procedure: BREAST TAG LOCALIZED LUMPECTOMY WITH SENTINEL LYMPH NODE BIOPSY;  Surgeon: Lillian Marie DO;  Location: [facility] Main OR     No Known Allergies  Family History   Problem Relation Age of Onset     Cancer Mother      Kidney Cancer Mother      Hypertension Father      Anxiety Disorder Sister      Anxiety Disorder Brother      Breast Cancer Paternal Grandmother      Anesthesia Reaction No family hx of      Social History     Socioeconomic History     Marital status:      Spouse name: Not on file     Number of children: Not on file     Years of education: Not on file     Highest education level: Not on file   Occupational History     Not on file   Tobacco Use     Smoking status: Never     Smokeless tobacco: Never   Vaping Use     Vaping Use: Never used   Substance and Sexual Activity     Alcohol use: Not  Currently     Drug use: Never     Sexual activity: Not on file   Other Topics Concern     Not on file   Social History Narrative     Not on file     Social Determinants of Health     Financial Resource Strain: Not on file   Food Insecurity: Not on file   Transportation Needs: Not on file   Physical Activity: Not on file   Stress: Not on file   Social Connections: Not on file   Intimate Partner Violence: Not on file   Housing Stability: Not on file        REVIEW OF SYMPTOMS:  A full 14-point review of systems was performed. Pertinent findings are noted in the HPI.    General  Constitutional  Constitutional (WDL): Exceptions to WDL  Fatigue: Fatigue relieved by rest  EENT  Eye Disorders  Eye Disorder (WDL): Exceptions to WDL (wears reading glasses)  Blurred Vision: Intervention not indicated (occasional floater)  Ear Disorders  Ear Disorder (WDL): Exceptions to WDL (occasional drainage in ears using zyrtec)  Respiratory  Respiratory  Respiratory (WDL): All respiratory elements are within defined limits  Cardiovascular  Cardiovascular  Cardiovascular (WDL): All cardiovascular elements are within defined limits (no pacemaker)  Gastrointestinal  Gastrointestinal  Gastrointestinal (WDL): All gastrointestinal elements are within defined limits  Musculoskeletal  Musculoskeletal and Connective Tissue Disorders  Musculoskeletal & Connective (WDL): All musculoskeletal & connective elements are within defined limits  Integumentary  Integumentary  Integumentary (WDL): Exceptions to WDL (healing incision right breast)  Neurological  Neurosensory  Neurosensory (WDL): All neurosensory elements are within defined limits  Genitourinary/Reproductive  Genitourinary  Genitourinary (WDL): All genitourinary elements are within defined limits  Lymphatic  Lymph System Disorders  Lymph (WDL): All lymph elements are within defined limits  Pain  Pain Score: No Pain (1)  AUA Assessment                                                               Accompanied by  Accompanied By: spouse ( - José Miguel)    ECOG Status: 0    Imaging: Reviewed    Pathology: Reviewed    Objective:      PHYSICAL EXAMINATION:    /87 (BP Location: Right arm, Patient Position: Sitting, Cuff Size: Adult Regular)   Pulse 94   Temp 97.8  F (36.6  C) (Oral)   Resp 16   Wt 74.8 kg (164 lb 12.8 oz)   SpO2 98%   BMI 28.29 kg/m      Gen: Alert, in NAD  Eyes: PERRL, EOMI, sclera anicteric  Neck: Supple, full ROM, no LAD  Pulm: No wheezing, stridor or respiratory distress  CV: Well-perfused, no cyanosis, no pedal edema  Back: No step-offs or pain to palpation along the thoracolumbar spine  Rectal: Deferred  : Deferred  Musculoskeletal: Normal muscle bulk and tone  Skin: Normal color and turgor  Neurologic: A/Ox3, CN II-XII intact, normal gait and station  Psychiatric: Appropriate mood and affect    Intent of Therapy: Curative    We recommend adjuvant radiation as part of her breast conserving therapy to decrease her chance of local recurrence to the single digits. ROM stretches and skin care were discussed with the patient. She understands that these maneuvers need to continue for 6 months following completion of radiation as skin and muscle fibrosis continue to form for weeks to months following completion of therapy.      Side effects that may occur during or within weeks after radiation therapy      Fatigue and general weakness    Darkening, irritation, itchiness, redness, dryness, erythema, peeling, scabbing, ulceration and contraction of the skin of the breast and chest    Swelling of the breast    Loss of armpit hair    Lung irritation    Decrease in appetite    Side effects that may occur months or years after radiation therapy      Development of another tumor or cancer    Thickening, telangiectasias (development of spider like blood vessels in the skin) and ulceration of the skin of the breast and chest    Firming, fibrosis (scar tissue), fat necrosis, and distortion  of the breast    Poor healing after a trauma or surgery in the irradiated area    Nerve damage resulting in loss of arm strength and sensation    Coronary artery blockage causing angina pain or a heart attack    Lung inflammation of fibrosis causing cough, fever and shortness of breath    Fracture of the ribs    Swellingof an arm and hand    The risks, benefits and alternatives to radiation therapy were outlined with the patient. All questions were answered and a consent was signed.     Impression     Right breast cancer, pathologic stage T1bN0(sn)M0, ER/WA positive, HER2 negative, status post lumpectomy and sentinel lymph node resection with negative margin and closest margin measured less than 1 mm anterior for invasive cancer and DCIS.     Assessment & Plan:     I have personally reviewed her upcoming medical record today.  I have also reviewed her most recent radiology study including mammogram.  The possible treatment options including surgery, systemic therapy, and radiation therapy has been discussed with patient in detail and at great lengths.  The possible risks and side effects of radiation therapy has also been explained to the patient.  Questions are answered to patient satisfaction.    We discussed the role of adjuvant radiation therapy following lumpectomy; specifically, we explained that the traditional standard of care is radiation therapy to the breast. Given her age, size of tumor, clear surgical margins and estrogen receptor positive status, I believe she has 2 reasonable options. One would be whole breast radiotherapy with a hypofractionated course. Alternatively, close observation and omission of radiotherapy could be a reasonable option. Data from the CALGB study looking at women over 70 years of age with favorable tumors such as Ms. Shahla's show a 10-year recurrence rate of 10% for patients undergoing lumpectomy and hormonal therapy. Virtually all of the patients with a local recurrence were  salvaged with subsequent surgery, either lumpectomy or mastectomy. The overall survival was equivalent for both groups. The benefit of radiotherapy was to reduce the local recurrence rate from 10% without radiotherapy to 2-3% at 10 years. Although this is statistically significant it does represent a very small benefit for radiotherapy for this group of patients.  Given the pathological finding of close surgical margin for both invasive cancer and DCIS, I think the patient has a higher than normal risk for local recurrence and postop radiation therapy would certainly reduce the chance of breast cancer recurrence.  The radiation therapy is offered to the patient and that she is willing to proceed being aware of potential risks and side effect involved.  Patient is scheduled to return to radiation oncology in 1 week for simulation.  I plan to give her radiation therapy to a total dose of 4256 cGy in 16 fractions with a subsequent 4 boosts to the lumpectomy cavity for a total of 5256 cGy in 20 fractions    Again, thank you very much for the referral and allowing me to participate in the care of this patient.  If you have any questions or concerns about this consultation, please do not hesitate to call.  I spent approximately 60 minutes today with the patient and 75% time was used for counseling.      Sincerely,      Tati Wright MD  Department of Radiation Oncology   [facility] Radiation Oncology  Tel: [phone]  Page: [phone]    [facility]  [address] [place] [address]     [address] [place] [address]    CC:  Patient Care Team:  Sammie Pérez MD as PCP - General (Family Practice)  Sammie Pérez MD as Assigned PCP  Lennie Herrera MD as Assigned Cancer Care Provider  Lillian Marie DO as Assigned Surgical Provider  Sammie Pérez MD as Assigned Pain Medication Provider

## 2023-06-27 NOTE — PROGRESS NOTES
"No pacemaker  No prior radiation therapy treatment  Postmenopausal    Oncology Rooming Note    June 27, 2023 11:20 AM   Nia Prieto is a 72 year old female who presents for:    Chief Complaint   Patient presents with     Oncology Clinic Visit     Consult with Dr. Wright     Initial Vitals: /87 (BP Location: Right arm, Patient Position: Sitting, Cuff Size: Adult Regular)   Pulse 94   Temp 97.8  F (36.6  C) (Oral)   Resp 16   Wt 74.8 kg (164 lb 12.8 oz)   SpO2 98%   BMI 28.29 kg/m   Estimated body mass index is 28.29 kg/m  as calculated from the following:    Height as of 6/8/23: 1.626 m (5' 4\").    Weight as of this encounter: 74.8 kg (164 lb 12.8 oz). Body surface area is 1.84 meters squared.  No Pain (1) Comment: Data Unavailable   No LMP recorded. Patient is postmenopausal.  Allergies reviewed: Yes  Medications reviewed: Yes    Medications: Medication refills not needed today.  Pharmacy name entered into [employer]: [facility] [id number] [place] [address]    Clinical concerns: Patient here ambulatory accompanied by  for radiation consult for her breast cancer.  Seen by Dr. Crawford medical oncology and will start hormone therapy after radiation.  15 minutes spent in review of radiation process and potential side effects.  Written information given for review.  Seen by Dr. Wright.  Plan return to clinic for follow-up and/or CT simulation as directed by provider. Dr. Wright was notified.    Radiation Therapy Patient Education    Person involved with teaching: Patient and     Patient educational needs for self management of treatment-related side effects assessment completed.  [facility] Patient Ed tab contains Patient Learning Assessment    Education Materials Given  Radiation Therapy and You, Understanding Radiation Therapy, Radiation Therapy Team, Radiation Therapy Treatment, Radiation Therapy: Managing Short-Term Side Effects, Skin Care During Radiation Therapy, Radiation " Therapy: Your Daily Life, Radiation Therapy to the Breast Guidelines, Oncology Supportive Care Services , Welcome Letter, Insurance PA Information and Map [employer]    Educational Topics Discussed  Side effects expected and Skin care    Response To Teaching  More review necessary    GYN Only  Vaginal Dilator-given and educated: N/A    Referrals sent: None    Chemotherapy?  No, Dr. Herrera medical oncologist for hormone therapy.            Apoorva Subramanian RN

## 2023-06-27 NOTE — LETTER
6/27/2023         RE: Nia Prieto  [address] [facility] [address]        Dear Colleague,    Thank you for referring your patient, Nia Prieto, to the [facility] RADIATION ONCOLOGY [place]. Please see a copy of my visit note below.    [facility] Radiation Oncology Consult Note     Patient: Nia Prieto  MRN: [id number]  Date of Service: 06/27/2023          Lillian Marie,   [address] [address]       Dear Dr. Marie:    Thank you very much for referring this patient for consideration of radiotherapy. As you know Ms. Prieto is a 72 year old female with a diagnosis of right breast cancer, pathologic stage T1bN0(sn)M0, ER/NE positive, HER2 negative, status post lumpectomy and sentinel lymph node resection with negative margin and closest margin measured less than 1 mm anterior for invasive cancer and DCIS.  The patient is referred to radiation oncology for evaluation and discussion of possible postop radiation therapy.    HISTORY OF PRESENT ILLNESS:   Ms. Prieto is a 72 year old female who has been in her usual state of health until recently.  The patient presented with abnormal finding by routine screening mammogram for which she was a seeking further evaluation.  The mammogram followed by ultrasound reviewed a 0.6 x 0.5 x 0.8 cm irregular hypoechoic mass at 11 o'clock position 4 cm from nipple of right breast suspicious for malignancy.  Patient underwent ultrasound-guided needle biopsy on 4/20/2023.  The pathology reviewed microinvasive ductal carcinoma less than 1 mm with DCIS.  ER/NE's are positive and HER2 negative.  Patient underwent lumpectomy and sentinel lymph node resection on 6/8/2023.  The pathology reviewed 5.3 x 5 mm invasive ductal carcinoma, Crescencio grade 2 with associated DCIS.  Margins are negative with closest margin measured less than 1 mm anterior for both invasive cancer and DCIS.  2 removed sentinel lymph node showed  no evidence of metastatic disease.  The patient has been recovering well since the surgery.  She saw Dr. Herrera, medical oncology on 6/22/2023 and adjuvant hormone therapy was recommended.  Patient is here today for evaluation and discussion of possible postop radiation therapy.    CHEMOTHERAPY HISTORY: Concurrent Chemotherapy: No    RADIATION THERAPY HISTORY: Prior Radiation: No    IMPLANTED CARDIAC DEVICE: none     PREGNANCY: The patient is informed not to be pregnant during the radiation therapy.  She is post menopausal.    Current Outpatient Medications   Medication Sig Dispense Refill     cetirizine (ZYRTEC) 10 MG tablet Take 10 mg by mouth daily       L. rhamnosus GG/inulin (CULTURELLE PROBIOTICS ORAL)        anastrozole (ARIMIDEX) 1 MG tablet Take 1 tablet (1 mg) by mouth daily (Patient not taking: Reported on 6/27/2023) 30 tablet 1     hydrocortisone (WESTCORT) 0.2 % external cream Apply topically 2 times daily as needed (eczema) (Patient not taking: Reported on 6/22/2023) 30 g 1     Past Medical History:   Diagnosis Date     Diabetes (H)      Ductal carcinoma in situ (DCIS) of breast with microinvasive component (H)     right     Eczema      Hypertension      Seasonal allergic rhinitis      Past Surgical History:   Procedure Laterality Date     CATARACT EXTRACTION Bilateral      COLONOSCOPY       EYE SURGERY       LUMPECTOMY BREAST Right 6/8/2023    Procedure: BREAST TAG LOCALIZED LUMPECTOMY WITH SENTINEL LYMPH NODE BIOPSY;  Surgeon: Lillian Maire DO;  Location: [facility] [place] [place] OR     No Known Allergies  Family History   Problem Relation Age of Onset     Cancer Mother      Kidney Cancer Mother      Hypertension Father      Anxiety Disorder Sister      Anxiety Disorder Brother      Breast Cancer Paternal Grandmother      Anesthesia Reaction No family hx of      Social History     Socioeconomic History     Marital status:      Spouse name: Not on file     Number of children: Not on file     Years of  education: Not on file     Highest education level: Not on file   Occupational History     Not on file   Tobacco Use     Smoking status: Never     Smokeless tobacco: Never   Vaping Use     Vaping Use: Never used   Substance and Sexual Activity     Alcohol use: Not Currently     Drug use: Never     Sexual activity: Not on file   Other Topics Concern     Not on file   Social History Narrative     Not on file     Social Determinants of Health     Financial Resource Strain: Not on file   Food Insecurity: Not on file   Transportation Needs: Not on file   Physical Activity: Not on file   Stress: Not on file   Social Connections: Not on file   Intimate Partner Violence: Not on file   Housing Stability: Not on file        REVIEW OF SYMPTOMS:  A full 14-point review of systems was performed. Pertinent findings are noted in the HPI.    General  Constitutional  Constitutional (WDL): Exceptions to WDL  Fatigue: Fatigue relieved by rest  EENT  Eye Disorders  Eye Disorder (WDL): Exceptions to WDL (wears reading glasses)  Blurred Vision: Intervention not indicated (occasional floater)  Ear Disorders  Ear Disorder (WDL): Exceptions to WDL (occasional drainage in ears using zyrtec)  Respiratory  Respiratory  Respiratory (WDL): All respiratory elements are within defined limits  Cardiovascular  Cardiovascular  Cardiovascular (WDL): All cardiovascular elements are within defined limits (no pacemaker)  Gastrointestinal  Gastrointestinal  Gastrointestinal (WDL): All gastrointestinal elements are within defined limits  Musculoskeletal  Musculoskeletal and Connective Tissue Disorders  Musculoskeletal & Connective (WDL): All musculoskeletal & connective elements are within defined limits  Integumentary  Integumentary  Integumentary (WDL): Exceptions to WDL (healing incision right breast)  Neurological  Neurosensory  Neurosensory (WDL): All neurosensory elements are within defined  limits  Genitourinary/Reproductive  Genitourinary  Genitourinary (WDL): All genitourinary elements are within defined limits  Lymphatic  Lymph System Disorders  Lymph (WDL): All lymph elements are within defined limits  Pain  Pain Score: No Pain (1)  AUA Assessment                                                              Accompanied by  Accompanied By: spouse ( - José Miguel)    ECOG Status: 0    Imaging: Reviewed    Pathology: Reviewed    Objective:      PHYSICAL EXAMINATION:    /87 (BP Location: Right arm, Patient Position: Sitting, Cuff Size: Adult Regular)   Pulse 94   Temp 97.8  F (36.6  C) (Oral)   Resp 16   Wt 74.8 kg (164 lb 12.8 oz)   SpO2 98%   BMI 28.29 kg/m      Gen: Alert, in NAD  Eyes: PERRL, EOMI, sclera anicteric  Neck: Supple, full ROM, no LAD  Pulm: No wheezing, stridor or respiratory distress  CV: Well-perfused, no cyanosis, no pedal edema  Back: No step-offs or pain to palpation along the thoracolumbar spine  Rectal: Deferred  : Deferred  Musculoskeletal: Normal muscle bulk and tone  Skin: Normal color and turgor  Neurologic: A/Ox3, CN II-XII intact, normal gait and station  Psychiatric: Appropriate mood and affect    Intent of Therapy: Curative    We recommend adjuvant radiation as part of her breast conserving therapy to decrease her chance of local recurrence to the single digits. ROM stretches and skin care were discussed with the patient. She understands that these maneuvers need to continue for 6 months following completion of radiation as skin and muscle fibrosis continue to form for weeks to months following completion of therapy.      Side effects that may occur during or within weeks after radiation therapy      Fatigue and general weakness    Darkening, irritation, itchiness, redness, dryness, erythema, peeling, scabbing, ulceration and contraction of the skin of the breast and chest    Swelling of the breast    Loss of armpit hair    Lung irritation    Decrease in  appetite    Side effects that may occur months or years after radiation therapy      Development of another tumor or cancer    Thickening, telangiectasias (development of spider like blood vessels in the skin) and ulceration of the skin of the breast and chest    Firming, fibrosis (scar tissue), fat necrosis, and distortion of the breast    Poor healing after a trauma or surgery in the irradiated area    Nerve damage resulting in loss of arm strength and sensation    Coronary artery blockage causing angina pain or a heart attack    Lung inflammation of fibrosis causing cough, fever and shortness of breath    Fracture of the ribs    Swellingof an arm and hand    The risks, benefits and alternatives to radiation therapy were outlined with the patient. All questions were answered and a consent was signed.     Impression     Right breast cancer, pathologic stage T1bN0(sn)M0, ER/UT positive, HER2 negative, status post lumpectomy and sentinel lymph node resection with negative margin and closest margin measured less than 1 mm anterior for invasive cancer and DCIS.     Assessment & Plan:     I have personally reviewed her upcoming medical record today.  I have also reviewed her most recent radiology study including mammogram.  The possible treatment options including surgery, systemic therapy, and radiation therapy has been discussed with patient in detail and at great lengths.  The possible risks and side effects of radiation therapy has also been explained to the patient.  Questions are answered to patient satisfaction.    We discussed the role of adjuvant radiation therapy following lumpectomy; specifically, we explained that the traditional standard of care is radiation therapy to the breast. Given her age, size of tumor, clear surgical margins and estrogen receptor positive status, I believe she has 2 reasonable options. One would be whole breast radiotherapy with a hypofractionated course. Alternatively, close  observation and omission of radiotherapy could be a reasonable option. Data from the CALGB study looking at women over 70 years of age with favorable tumors such as Ms. Gale's show a 10-year recurrence rate of 10% for patients undergoing lumpectomy and hormonal therapy. Virtually all of the patients with a local recurrence were salvaged with subsequent surgery, either lumpectomy or mastectomy. The overall survival was equivalent for both groups. The benefit of radiotherapy was to reduce the local recurrence rate from 10% without radiotherapy to 2-3% at 10 years. Although this is statistically significant it does represent a very small benefit for radiotherapy for this group of patients.  Given the pathological finding of close surgical margin for both invasive cancer and DCIS, I think the patient has a higher than normal risk for local recurrence and postop radiation therapy would certainly reduce the chance of breast cancer recurrence.  The radiation therapy is offered to the patient and that she is willing to proceed being aware of potential risks and side effect involved.  Patient is scheduled to return to radiation oncology in 1 week for simulation.  I plan to give her radiation therapy to a total dose of 4256 cGy in 16 fractions with a subsequent 4 boosts to the lumpectomy cavity for a total of 5256 cGy in 20 fractions    Again, thank you very much for the referral and allowing me to participate in the care of this patient.  If you have any questions or concerns about this consultation, please do not hesitate to call.  I spent approximately 60 minutes today with the patient and 75% time was used for counseling.      Sincerely,      Tati Wright MD  Department of Radiation Oncology   [facility] Radiation Oncology  Tel: [phone]  Page: [phone]    [facility]  [address] [address]     [place] [id number] [facility] [address] [place] [address]    CC:  Patient Care  "Team:  Sammie Pérez MD as PCP - General (Family Practice)  Sammie Pérez MD as Assigned PCP  Lennie Herrera MD as Assigned Cancer Care Provider  Lillian Marie DO as Assigned Surgical Provider  Sammie Pérez MD as Assigned Pain Medication Provider        No pacemaker  No prior radiation therapy treatment  Postmenopausal    Oncology Rooming Note    June 27, 2023 11:20 AM   Nia Prieto is a 72 year old female who presents for:    Chief Complaint   Patient presents with     Oncology Clinic Visit     Consult with Dr. Wright     Initial Vitals: /87 (BP Location: Right arm, Patient Position: Sitting, Cuff Size: Adult Regular)   Pulse 94   Temp 97.8  F (36.6  C) (Oral)   Resp 16   Wt 74.8 kg (164 lb 12.8 oz)   SpO2 98%   BMI 28.29 kg/m   Estimated body mass index is 28.29 kg/m  as calculated from the following:    Height as of 6/8/23: 1.626 m (5' 4\").    Weight as of this encounter: 74.8 kg (164 lb 12.8 oz). Body surface area is 1.84 meters squared.  No Pain (1) Comment: Data Unavailable   No LMP recorded. Patient is postmenopausal.  Allergies reviewed: Yes  Medications reviewed: Yes    Medications: Medication refills not needed today.  Pharmacy name entered into [employer]: [facility] [id number] [place] [address]    Clinical concerns: Patient here ambulatory accompanied by  for radiation consult for her breast cancer.  Seen by Dr. Crawford medical oncology and will start hormone therapy after radiation.  15 minutes spent in review of radiation process and potential side effects.  Written information given for review.  Seen by Dr. Wright.  Plan return to clinic for follow-up and/or CT simulation as directed by provider. Dr. Wright was notified.    Radiation Therapy Patient Education    Person involved with teaching: Patient and     Patient educational needs for self management of treatment-related side effects assessment completed.  [employer] Patient Ed tab contains " Patient Learning Assessment    Education Materials Given  Radiation Therapy and You, Understanding Radiation Therapy, Radiation Therapy Team, Radiation Therapy Treatment, Radiation Therapy: Managing Short-Term Side Effects, Skin Care During Radiation Therapy, Radiation Therapy: Your Daily Life, Radiation Therapy to the Breast Guidelines, Oncology Supportive Care Services , Welcome Letter, Insurance PA Information and Map [facility]    Educational Topics Discussed  Side effects expected and Skin care    Response To Teaching  More review necessary    GYN Only  Vaginal Dilator-given and educated: N/A    Referrals sent: None    Chemotherapy?  No, Dr. Herrera medical oncologist for hormone therapy.            Apoorva Subramanian RN                  Again, thank you for allowing me to participate in the care of your patient.        Sincerely,        Tati Wright MD

## 2023-06-28 ENCOUNTER — VIRTUAL VISIT (OUTPATIENT)
Dept: ONCOLOGY | Facility: CLINIC | Age: 73
End: 2023-06-28
Attending: SURGERY
Payer: MEDICARE

## 2023-06-28 DIAGNOSIS — C50.911 INVASIVE DUCTAL CARCINOMA OF BREAST, STAGE 1, RIGHT (H): Primary | ICD-10-CM

## 2023-06-28 DIAGNOSIS — Z80.3 FAMILY HISTORY OF MALIGNANT NEOPLASM OF BREAST: ICD-10-CM

## 2023-06-28 PROCEDURE — 96040 HC GENETIC COUNSELING, EACH 30 MINUTES: CPT | Mod: 95 | Performed by: GENETIC COUNSELOR, MS

## 2023-06-28 NOTE — LETTER
2023    Nia Prieto  [address] [facility] [address]    Dear Nia,    It was a pleasure speaking with you over video on 2023. Here is a copy of the progress note from our discussion. If you have any additional questions, please feel free to call.    Referring Provider: Lillian Marie DO    Presenting Information:   I met with Nia for her video genetic counseling visit, through the Cancer Risk Management Program, to discuss her personal and family history of breast cancer. Today we reviewed this history, cancer screening recommendations, and available genetic testing options.    Personal History:  Nia is a 72 year old year old female. She was diagnosed with invasive ductal carcinoma (IDC) ain her right breast at age 72 (ER/FL positive, HER2 negative); treatment included lumpectomy and adjuvant endocrine therapy. She had negative genetic testing for 11 genes through the Breast Actionable panel at the [facility] at [facility]: KAELA, BARD1, BRCA1, BRCA2, CDH1, CHEK2, NF1, PALB2, PTEN, STK11, and TP53.    She had her first menstrual period at age 14 or 15, she does not have children, and completed menopause in her late 40's. Nia has her ovaries, fallopian tubes and uterus in place, and she has had no ovarian cancer screening to date. She reports that she has not used hormone replacement therapy.      Her most recent mammogram in 2023 found an irregular mass that was identified as IDC. Nia began having colonoscopies at the age of 64. Her most recent colonoscopy in 2015 was normal and follow-up was recommended in 10 years. She does not regularly do any other cancer screening at this time.     Family History: (Please see scanned pedigree for detailed family history information)  Nia's mother  from metastatic cancer of unknown origin at age 49.  A paternal aunt was diagnosed with and  from breast cancer at age  95.  Nia's paternal grandmother was diagnosed with breast cancer in her 60's and  at age 65.  There is no other reported family history of cancer on her paternal side of the family.  Her maternal ethnicity is [place] and [place]. Her paternal ethnicity is Japanese and Syriac. There is no known Ashkenazi Congregational ancestry on either side of her family. There is no reported consanguinity.    Discussion:  Nia's personal and family history of breast cancer is suggestive of a hereditary cancer syndrome.  We reviewed the features of sporadic, familial, and hereditary cancers. We discussed that mutations in either BRCA1 or BRCA2 could be possible hereditary explanations for her family history of cancer. Mutations in the BRCA1 or BRCA2 gene are known to cause Hereditary Breast and Ovarian Cancer Syndrome (HBOC). HBOC typically presents with multiple family members diagnosed with breast cancer before age 50 and/or ovarian cancer. Other cancer risks associated with HBOC include male breast cancer, prostate cancer, pancreatic cancer, and melanoma.   We discussed the natural history and genetics of hereditary cancer. A detailed handout regarding hereditary cancer, along with the other information we discussed, will be mailed to Nia at the end of our appointment today and can be found in the after visit summary. Topics included: inheritance pattern, cancer risks, cancer screening recommendations, and also risks, benefits and limitations of testing.  We reviewed the BRCA1/2 genes, along with the 9 other genes, for which Nia had previous negative genetic testing. The National Comprehensive Cancer Network (NCCN) guidelines mention that there may be a role for multi-gene panel genetic testing for patients who have previously tested negative for a single hereditary cancer syndrome, but have a suspicious personal or family history.  Based on her personal and family history, Nia meets current National Comprehensive  Cancer Network (NCCN) criteria for genetic testing of BRCA1/2 along with other high-penetrance breast cancer susceptibility genes (I.e. CDH1, PALB2, PTEN, and TP53).  We discussed that there are additional genes that could cause increased risk for breast cancer. As many of these genes present with overlapping features in a family and accurate cancer risk cannot always be established based upon the pedigree analysis alone, it would be reasonable for Nia to consider panel genetic testing to analyze multiple genes at once.  Genetic testing is available for 40 genes associated with hereditary cancer: Comprehensive 40 (APC, KAELA, AXIN2, BAP1, BARD1, BMPR1A, BRCA1, BRCA2, BRIP1, CDH1, CDK4, CDKN2A, CHEK2, DICER1, EPCAM, GREM1, HOXB13, MITF, MLH1, MRE11A, MSH2, MSH3, MSH6, MUTYH, NBN, NF1, NTHL1, PALB2, PMS2, POLD1, POLE, POT1, PTEN, RAD50, RAD51C, RAD51D, SMAD4, SMARCA4, STK11, and TP53).  We discussed that many of the genes in the Comprehensive 40 panel are associated with specific hereditary cancer syndromes and published management guidelines: Hereditary Breast and Ovarian Cancer syndrome (BRCA1, BRCA2), Miranda syndrome (MLH1, MSH2, MSH6, PMS2, EPCAM), Familial Adenomatous Polyposis (APC), Hereditary Diffuse Gastric Cancer (CDH1), Familial Atypical Multiple Mole Melanoma syndrome (CDK4, CDKN2A), Juvenile Polyposis syndrome (BMPR1A, SMAD4), Cowden syndrome (PTEN), Li Fraumeni syndrome (TP53), Peutz-Jeghers syndrome (STK11), MUTYH Associated Polyposis (MUTYH), and Neurofibromatosis type 1 (NF1).   The KAELA, AXIN2, BRIP1, CHEK2, GREM1, MSH3, NBN, NTHL1, PALB2, POLD1, POLE, RAD51C, and RAD51D genes are associated with increased cancer risk and have published management guidelines for certain cancers.    The remaining genes (BAP1, BARD1, DICER1, HOXB13, MITF, MRE11A, POT1, RAD50, and SMARCA4) are associated with increased cancer risk and may allow us to make medical recommendations when mutations are identified.    We  discussed that her sample is already in the lab and will be used for this genetic testing.  Verbal consent was given over video and written on the consent form. Turnaround time is approximately 4 weeks once the lab receives the sample.  Medical Management: For Nia, we reviewed that the information from genetic testing may determine:  additional cancer screening for which Nia may qualify (i.e. mammogram and breast MRI, more frequent colonoscopies, more frequent dermatologic exams, etc.),  options for risk reducing surgeries Nia could consider (i.e. bilateral mastectomy, surgery to remove her ovaries and/or uterus, etc.),    and targeted chemotherapies for Nia's active cancer, if needed, or if she were to develop certain cancers in the future (i.e. immunotherapy for individuals with Miranda syndrome, PARP inhibitors, etc.).   These recommendations and possible targeted chemotherapies will be discussed in detail once genetic testing is completed.     Plan:  1) Today Nia elected to proceed with the Comprehensive 40 Cancer panel through [facility] ([facility] at [facility].  2) A copy of the consent form and the after visit summary will be mailed to Nia.  3) This information should be available in approximately 4 weeks.  4) I will call Nia with the results once they become available.    Time spent on video: 33 minutes    aHseeb Wiley MS, [facility]  Licensed, Certified Genetic Counselor

## 2023-06-28 NOTE — Clinical Note
"    6/28/2023         RE: Nia Prieto  [address] [facility] [address]        Dear Colleague,    Thank you for referring your patient, Nia Prieto, to the [facility]. Please see a copy of my visit note below.    Virtual Visit Details    Type of service:  Video Visit   Video Start Time: {video visit start/end time for provider to select:961567}  Video End Time:{video visit start/end time for provider to select:971308}    Originating Location (pt. Location): {video visit patient location:141661::\"Home\"}  {PROVIDER LOCATION On-site should be selected for visits conducted from your clinic location or adjoining [facility] hospital, academic office, or other nearby [facility] building. Off-site should be selected for all other provider locations, including home:634870}  Distant Location (provider location):  {virtual location provider:574703}  Platform used for Video Visit: {Virtual Visit Platforms:176260::\"ASP64\"}    6/28/2023    Referring Provider: ***    Presenting Information:   I met with Nia for her video genetic counseling visit, through the Cancer Risk Management Program, to discuss her personal *** and family history of *** cancer. Today we reviewed this history, cancer screening recommendations, and available genetic testing options.    Personal History:  Nia is a 72 year old year old female. She was diagnosed with ***; treatment included ***  / does not have any personal history of cancer.    ***She had her first menstrual period at age ***, her first child at age ***, and is ***.  ***Nia has her ovaries, fallopian tubes and uterus in place, and she has had *** ovarian cancer screening to date. She reports that she *** hormone replacement therapy.      She has *** clinical breast exams and mammograms; her most recent mammogram in *** was ***. ***Nia began having colonoscopies at the age of ***/Nia has not had a colonoscopy. Her most recent " colonoscopy in *** was *** and follow-up was recommended in ***. ***She does not regularly do any other cancer screening at this time. Nia reported *** tobacco use and *** alcohol use.    Family History: (Please see scanned pedigree for detailed family history information)    ***    ***    Her maternal ethnicity is ***. Her paternal ethnicity is ***. There is no known Ashkenazi Amish ancestry on either side of her family. There is no reported consanguinity.    Discussion:    Nia's personal and family history of *** is suggestive of a hereditary cancer syndrome.    We reviewed the features of sporadic, familial, and hereditary cancers. ***    We discussed the natural history and genetics of hereditary cancer. A detailed handout regarding hereditary cancer, along with the other information we discussed, will be mailed to Nia at the end of our appointment today and can be found in the after visit summary. Topics included: inheritance pattern, cancer risks, cancer screening recommendations, and also risks, benefits and limitations of testing.    Based on her personal and family history, Nia meets current National Comprehensive Cancer Network (NCCN) criteria for genetic testing of ***.    We discussed that there are additional genes that could cause increased risk for *** cancer. As many of these genes present with overlapping features in a family and accurate cancer risk cannot always be established based upon the pedigree analysis alone, it would be reasonable for Nia to consider panel genetic testing to analyze multiple genes at once.    ***    Nia stated that she would prefer to submit a saliva kit for her genetic testing. ***[employer] will send a kit directly to her home with directions on how to collect a saliva sample. We discussed that there is a small chance for sample failure due to contamination of the sample. To help minimize this, she should follow the directions that are sent  with the kit. Nia verbalized understanding of this. Once the sample is collected, she will send it to ***[employer] using the return envelope and prepaid shipping label.     Verbal consent was given over video*** and written on the consent form. Turnaround time is approximately 4 weeks once the lab receives the sample.    Medical Management: For Nia, we reviewed that the information from genetic testing may determine:    ***surgery to treat Nia's active cancer diagnosis (i.e. lumpectomy versus bilateral mastectomy, partial versus total colectomy, etc.***),    additional cancer screening for which Nia may qualify (i.e. mammogram and breast MRI, more frequent colonoscopies, more frequent dermatologic exams, etc.***),    options for risk reducing surgeries Nia could consider (i.e. bilateral mastectomy, surgery to remove her ovaries and/or uterus, etc.***),      and targeted chemotherapies for Nia's *** active cancer, or ***if she were to develop certain cancers in the future (i.e. immunotherapy for individuals with Miranda syndrome, PARP inhibitors, etc.***).     These recommendations and possible targeted chemotherapies will be discussed in detail once genetic testing is completed.     Plan:  1) Today Nia elected to proceed with ***.  2) A copy of the consent form and the after visit summary will be mailed to Nia.  3) This information should be available in approximately 4 weeks, once the lab receives the sample.  4) I will call Nia with the results once they become available.    Time spent on video: *** minutes    Haseeb Wiley MS, [facility]  Licensed, Certified Genetic Counselor    ***    Virtual Visit Details    Type of service:  Video Visit     Originating Location (pt. Location): Home  Distant Location (provider location):  Off-site  Platform used for Video Visit: AmWell      Again, thank you for allowing me to participate in the care of your patient.         Sincerely,        Haseeb Wiley, GC

## 2023-06-28 NOTE — PROGRESS NOTES
2023    Referring Provider: Lillian Marie DO    Presenting Information:   I met with Nia for her video genetic counseling visit, through the Cancer Risk Management Program, to discuss her personal and family history of breast cancer. Today we reviewed this history, cancer screening recommendations, and available genetic testing options.    Personal History:  Nia is a 72 year old year old female. She was diagnosed with invasive ductal carcinoma (IDC) ain her right breast at age 72 (ER/KY positive, HER2 negative); treatment included lumpectomy and adjuvant endocrine therapy. She had negative genetic testing for 11 genes through the Breast Actionable panel at the [facility]: KAELA, BARD1, BRCA1, BRCA2, CDH1, CHEK2, NF1, PALB2, PTEN, STK11, and TP53.    She had her first menstrual period at age 14 or 15, she does not have children, and completed menopause in her late 40's. Nia has her ovaries, fallopian tubes and uterus in place, and she has had no ovarian cancer screening to date. She reports that she has not used hormone replacement therapy.      Her most recent mammogram in 2023 found an irregular mass that was identified as IDC. Nia began having colonoscopies at the age of 64. Her most recent colonoscopy in 2015 was normal and follow-up was recommended in 10 years. She does not regularly do any other cancer screening at this time.     Family History: (Please see scanned pedigree for detailed family history information)  Nia's mother  from metastatic cancer of unknown origin at age 49.  A paternal aunt was diagnosed with and  from breast cancer at age 95.  Nia's paternal grandmother was diagnosed with breast cancer in her 60's and  at age 65.  There is no other reported family history of cancer on her paternal side of the family.  Her maternal ethnicity is Egyptian and Romanian. Her paternal ethnicity is Spanish and  Ayde. There is no known Ashkenazi Yazidi ancestry on either side of her family. There is no reported consanguinity.    Discussion:  Nia's personal and family history of breast cancer is suggestive of a hereditary cancer syndrome.  We reviewed the features of sporadic, familial, and hereditary cancers. We discussed that mutations in either BRCA1 or BRCA2 could be possible hereditary explanations for her family history of cancer. Mutations in the BRCA1 or BRCA2 gene are known to cause Hereditary Breast and Ovarian Cancer Syndrome (HBOC). HBOC typically presents with multiple family members diagnosed with breast cancer before age 50 and/or ovarian cancer. Other cancer risks associated with HBOC include male breast cancer, prostate cancer, pancreatic cancer, and melanoma.   We discussed the natural history and genetics of hereditary cancer. A detailed handout regarding hereditary cancer, along with the other information we discussed, will be mailed to Nia at the end of our appointment today and can be found in the after visit summary. Topics included: inheritance pattern, cancer risks, cancer screening recommendations, and also risks, benefits and limitations of testing.  We reviewed the BRCA1/2 genes, along with the 9 other genes, for which Nia had previous negative genetic testing. The National Comprehensive Cancer Network (NCCN) guidelines mention that there may be a role for multi-gene panel genetic testing for patients who have previously tested negative for a single hereditary cancer syndrome, but have a suspicious personal or family history.  Based on her personal and family history, Nia meets current National Comprehensive Cancer Network (NCCN) criteria for genetic testing of BRCA1/2 along with other high-penetrance breast cancer susceptibility genes (I.e. CDH1, PALB2, PTEN, and TP53).  We discussed that there are additional genes that could cause increased risk for breast cancer. As many of  these genes present with overlapping features in a family and accurate cancer risk cannot always be established based upon the pedigree analysis alone, it would be reasonable for Nia to consider panel genetic testing to analyze multiple genes at once.  Genetic testing is available for 40 genes associated with hereditary cancer: Comprehensive 40 (APC, KAELA, AXIN2, BAP1, BARD1, BMPR1A, BRCA1, BRCA2, BRIP1, CDH1, CDK4, CDKN2A, CHEK2, DICER1, EPCAM, GREM1, HOXB13, MITF, MLH1, MRE11A, MSH2, MSH3, MSH6, MUTYH, NBN, NF1, NTHL1, PALB2, PMS2, POLD1, POLE, POT1, PTEN, RAD50, RAD51C, RAD51D, SMAD4, SMARCA4, STK11, and TP53).  We discussed that many of the genes in the Comprehensive 40 panel are associated with specific hereditary cancer syndromes and published management guidelines: Hereditary Breast and Ovarian Cancer syndrome (BRCA1, BRCA2), Miranda syndrome (MLH1, MSH2, MSH6, PMS2, EPCAM), Familial Adenomatous Polyposis (APC), Hereditary Diffuse Gastric Cancer (CDH1), Familial Atypical Multiple Mole Melanoma syndrome (CDK4, CDKN2A), Juvenile Polyposis syndrome (BMPR1A, SMAD4), Cowden syndrome (PTEN), Li Fraumeni syndrome (TP53), Peutz-Jeghers syndrome (STK11), MUTYH Associated Polyposis (MUTYH), and Neurofibromatosis type 1 (NF1).   The KAELA, AXIN2, BRIP1, CHEK2, GREM1, MSH3, NBN, NTHL1, PALB2, POLD1, POLE, RAD51C, and RAD51D genes are associated with increased cancer risk and have published management guidelines for certain cancers.    The remaining genes (BAP1, BARD1, DICER1, HOXB13, MITF, MRE11A, POT1, RAD50, and SMARCA4) are associated with increased cancer risk and may allow us to make medical recommendations when mutations are identified.    We discussed that her sample is already in the lab and will be used for this genetic testing.  Verbal consent was given over video and written on the consent form. Turnaround time is approximately 4 weeks once the lab receives the sample.  Medical Management: For Nia, we  reviewed that the information from genetic testing may determine:  additional cancer screening for which Nia may qualify (i.e. mammogram and breast MRI, more frequent colonoscopies, more frequent dermatologic exams, etc.),  options for risk reducing surgeries Nia could consider (i.e. bilateral mastectomy, surgery to remove her ovaries and/or uterus, etc.),    and targeted chemotherapies for Nia's active cancer, if needed, or if she were to develop certain cancers in the future (i.e. immunotherapy for individuals with Miranda syndrome, PARP inhibitors, etc.).   These recommendations and possible targeted chemotherapies will be discussed in detail once genetic testing is completed.     Plan:  1) Today Nia elected to proceed with the Comprehensive 40 Cancer panel through [facility] ([facility] at [facility].  2) A copy of the consent form and the after visit summary will be mailed to Nia.  3) This information should be available in approximately 4 weeks.  4) I will call Nia with the results once they become available.    Time spent on video: 33 minutes    Haseeb Wiley MS, [facility]  Licensed, Certified Genetic Counselor      Virtual Visit Details    Type of service:  Video Visit     Originating Location (pt. Location): Home  Distant Location (provider location):  Off-site  Platform used for Video Visit: Tanner

## 2023-06-28 NOTE — NURSING NOTE
Is the patient currently in the state of [place]? YES    Visit mode:VIDEO    If the visit is dropped, the patient can be reconnected by: VIDEO VISIT: Text to cell phone: [phone]    Will anyone else be joining the visit? Yes,  Kaushik      How would you like to obtain your AVS? MyChart    Are changes needed to the allergy or medication list? NO    Reason for visit: Consult    KIMBERLEY AMBROSIO

## 2023-06-29 PROCEDURE — G0452 MOLECULAR PATHOLOGY INTERPR: HCPCS | Mod: 26 | Performed by: PATHOLOGY

## 2023-07-05 ENCOUNTER — ANESTHESIA EVENT (OUTPATIENT)
Dept: SURGERY | Facility: CLINIC | Age: 73
End: 2023-07-05
Payer: MEDICARE

## 2023-07-05 ENCOUNTER — HOSPITAL ENCOUNTER (OUTPATIENT)
Facility: CLINIC | Age: 73
Discharge: HOME | End: 2023-07-06
Attending: EMERGENCY MEDICINE | Admitting: SPECIALIST
Payer: MEDICARE

## 2023-07-05 ENCOUNTER — APPOINTMENT (OUTPATIENT)
Dept: CT IMAGING | Facility: CLINIC | Age: 73
End: 2023-07-05
Attending: EMERGENCY MEDICINE
Payer: MEDICARE

## 2023-07-05 ENCOUNTER — APPOINTMENT (OUTPATIENT)
Dept: ULTRASOUND IMAGING | Facility: CLINIC | Age: 73
End: 2023-07-05
Attending: EMERGENCY MEDICINE
Payer: MEDICARE

## 2023-07-05 ENCOUNTER — ANESTHESIA (OUTPATIENT)
Dept: SURGERY | Facility: CLINIC | Age: 73
End: 2023-07-05
Payer: MEDICARE

## 2023-07-05 DIAGNOSIS — K81.0 ACUTE CHOLECYSTITIS: ICD-10-CM

## 2023-07-05 LAB
ALBUMIN SERPL-MCNC: 4 G/DL (ref 3.5–5)
ALP SERPL-CCNC: 57 U/L (ref 45–120)
ALT SERPL W P-5'-P-CCNC: <9 U/L (ref 0–45)
ANION GAP SERPL CALCULATED.3IONS-SCNC: 10 MMOL/L (ref 5–18)
AST SERPL W P-5'-P-CCNC: 12 U/L (ref 0–40)
ATRIAL RATE - MUSE: 80 BPM
BASOPHILS # BLD AUTO: 0 10E3/UL (ref 0–0.2)
BASOPHILS NFR BLD AUTO: 0 %
BILIRUB SERPL-MCNC: 0.5 MG/DL (ref 0–1)
BUN SERPL-MCNC: 10 MG/DL (ref 8–28)
CALCIUM SERPL-MCNC: 9.9 MG/DL (ref 8.5–10.5)
CHLORIDE BLD-SCNC: 101 MMOL/L (ref 98–107)
CO2 SERPL-SCNC: 24 MMOL/L (ref 22–31)
CREAT SERPL-MCNC: 0.72 MG/DL (ref 0.6–1.1)
DIASTOLIC BLOOD PRESSURE - MUSE: 67 MMHG
EOSINOPHIL # BLD AUTO: 0 10E3/UL (ref 0–0.7)
EOSINOPHIL NFR BLD AUTO: 0 %
ERYTHROCYTE [DISTWIDTH] IN BLOOD BY AUTOMATED COUNT: 13.2 % (ref 10–15)
GFR SERPL CREATININE-BSD FRML MDRD: 89 ML/MIN/1.73M2
GLUCOSE BLD-MCNC: 157 MG/DL (ref 70–125)
HCT VFR BLD AUTO: 42.2 % (ref 35–47)
HGB BLD-MCNC: 13.6 G/DL (ref 11.7–15.7)
IMM GRANULOCYTES # BLD: 0.1 10E3/UL
IMM GRANULOCYTES NFR BLD: 0 %
INTERPRETATION ECG - MUSE: NORMAL
LIPASE SERPL-CCNC: 11 U/L (ref 0–52)
LYMPHOCYTES # BLD AUTO: 0.6 10E3/UL (ref 0.8–5.3)
LYMPHOCYTES NFR BLD AUTO: 4 %
MCH RBC QN AUTO: 29.1 PG (ref 26.5–33)
MCHC RBC AUTO-ENTMCNC: 32.2 G/DL (ref 31.5–36.5)
MCV RBC AUTO: 90 FL (ref 78–100)
MONOCYTES # BLD AUTO: 1.3 10E3/UL (ref 0–1.3)
MONOCYTES NFR BLD AUTO: 9 %
NEUTROPHILS # BLD AUTO: 12.3 10E3/UL (ref 1.6–8.3)
NEUTROPHILS NFR BLD AUTO: 87 %
NRBC # BLD AUTO: 0 10E3/UL
NRBC BLD AUTO-RTO: 0 /100
P AXIS - MUSE: 60 DEGREES
PLATELET # BLD AUTO: 254 10E3/UL (ref 150–450)
POTASSIUM BLD-SCNC: 4.4 MMOL/L (ref 3.5–5)
PR INTERVAL - MUSE: 120 MS
PROT SERPL-MCNC: 7.5 G/DL (ref 6–8)
QRS DURATION - MUSE: 90 MS
QT - MUSE: 384 MS
QTC - MUSE: 442 MS
R AXIS - MUSE: 55 DEGREES
RBC # BLD AUTO: 4.68 10E6/UL (ref 3.8–5.2)
SODIUM SERPL-SCNC: 135 MMOL/L (ref 136–145)
SYSTOLIC BLOOD PRESSURE - MUSE: 117 MMHG
T AXIS - MUSE: 57 DEGREES
TROPONIN I SERPL-MCNC: <0.01 NG/ML (ref 0–0.29)
VENTRICULAR RATE- MUSE: 80 BPM
WBC # BLD AUTO: 14.3 10E3/UL (ref 4–11)

## 2023-07-05 PROCEDURE — 96376 TX/PRO/DX INJ SAME DRUG ADON: CPT

## 2023-07-05 PROCEDURE — 258N000003 HC RX IP 258 OP 636: Performed by: NURSE ANESTHETIST, CERTIFIED REGISTERED

## 2023-07-05 PROCEDURE — 96376 TX/PRO/DX INJ SAME DRUG ADON: CPT | Mod: 59

## 2023-07-05 PROCEDURE — 99222 1ST HOSP IP/OBS MODERATE 55: CPT | Mod: AI | Performed by: HOSPITALIST

## 2023-07-05 PROCEDURE — 76705 ECHO EXAM OF ABDOMEN: CPT

## 2023-07-05 PROCEDURE — 250N000011 HC RX IP 250 OP 636: Performed by: SPECIALIST

## 2023-07-05 PROCEDURE — 258N000003 HC RX IP 258 OP 636: Performed by: STUDENT IN AN ORGANIZED HEALTH CARE EDUCATION/TRAINING PROGRAM

## 2023-07-05 PROCEDURE — 258N000003 HC RX IP 258 OP 636: Performed by: ANESTHESIOLOGY

## 2023-07-05 PROCEDURE — G0378 HOSPITAL OBSERVATION PER HR: HCPCS

## 2023-07-05 PROCEDURE — 96375 TX/PRO/DX INJ NEW DRUG ADDON: CPT

## 2023-07-05 PROCEDURE — 99285 EMERGENCY DEPT VISIT HI MDM: CPT | Mod: 25

## 2023-07-05 PROCEDURE — 250N000013 HC RX MED GY IP 250 OP 250 PS 637: Performed by: EMERGENCY MEDICINE

## 2023-07-05 PROCEDURE — 360N000076 HC SURGERY LEVEL 3, PER MIN: Performed by: SPECIALIST

## 2023-07-05 PROCEDURE — 84484 ASSAY OF TROPONIN QUANT: CPT | Performed by: EMERGENCY MEDICINE

## 2023-07-05 PROCEDURE — 96365 THER/PROPH/DIAG IV INF INIT: CPT | Mod: 59

## 2023-07-05 PROCEDURE — 83690 ASSAY OF LIPASE: CPT | Performed by: EMERGENCY MEDICINE

## 2023-07-05 PROCEDURE — 710N000010 HC RECOVERY PHASE 1, LEVEL 2, PER MIN: Performed by: SPECIALIST

## 2023-07-05 PROCEDURE — 47562 LAPAROSCOPIC CHOLECYSTECTOMY: CPT | Mod: 79 | Performed by: SPECIALIST

## 2023-07-05 PROCEDURE — 999N000141 HC STATISTIC PRE-PROCEDURE NURSING ASSESSMENT: Performed by: SPECIALIST

## 2023-07-05 PROCEDURE — 250N000011 HC RX IP 250 OP 636: Performed by: EMERGENCY MEDICINE

## 2023-07-05 PROCEDURE — 258N000001 HC RX 258: Performed by: SPECIALIST

## 2023-07-05 PROCEDURE — 93005 ELECTROCARDIOGRAM TRACING: CPT | Performed by: EMERGENCY MEDICINE

## 2023-07-05 PROCEDURE — 250N000013 HC RX MED GY IP 250 OP 250 PS 637: Performed by: SPECIALIST

## 2023-07-05 PROCEDURE — 250N000009 HC RX 250: Performed by: NURSE ANESTHETIST, CERTIFIED REGISTERED

## 2023-07-05 PROCEDURE — 96374 THER/PROPH/DIAG INJ IV PUSH: CPT | Mod: XU

## 2023-07-05 PROCEDURE — 99207 PR APP CREDIT; MD BILLING SHARED VISIT: CPT | Mod: GC | Performed by: FAMILY MEDICINE

## 2023-07-05 PROCEDURE — 272N000001 HC OR GENERAL SUPPLY STERILE: Performed by: SPECIALIST

## 2023-07-05 PROCEDURE — 250N000011 HC RX IP 250 OP 636: Mod: JZ | Performed by: EMERGENCY MEDICINE

## 2023-07-05 PROCEDURE — 88304 TISSUE EXAM BY PATHOLOGIST: CPT | Mod: TC | Performed by: SPECIALIST

## 2023-07-05 PROCEDURE — 250N000011 HC RX IP 250 OP 636: Mod: JZ | Performed by: SPECIALIST

## 2023-07-05 PROCEDURE — 250N000025 HC SEVOFLURANE, PER MIN: Performed by: SPECIALIST

## 2023-07-05 PROCEDURE — 80053 COMPREHEN METABOLIC PANEL: CPT | Performed by: EMERGENCY MEDICINE

## 2023-07-05 PROCEDURE — 370N000017 HC ANESTHESIA TECHNICAL FEE, PER MIN: Performed by: SPECIALIST

## 2023-07-05 PROCEDURE — 99214 OFFICE O/P EST MOD 30 MIN: CPT | Mod: 57 | Performed by: PHYSICIAN ASSISTANT

## 2023-07-05 PROCEDURE — 250N000011 HC RX IP 250 OP 636: Mod: JZ | Performed by: NURSE ANESTHETIST, CERTIFIED REGISTERED

## 2023-07-05 PROCEDURE — 85025 COMPLETE CBC W/AUTO DIFF WBC: CPT | Performed by: EMERGENCY MEDICINE

## 2023-07-05 PROCEDURE — 250N000009 HC RX 250: Performed by: EMERGENCY MEDICINE

## 2023-07-05 PROCEDURE — 250N000011 HC RX IP 250 OP 636: Mod: JZ | Performed by: HOSPITALIST

## 2023-07-05 PROCEDURE — 36415 COLL VENOUS BLD VENIPUNCTURE: CPT | Performed by: EMERGENCY MEDICINE

## 2023-07-05 PROCEDURE — G1010 CDSM STANSON: HCPCS

## 2023-07-05 RX ORDER — DEXAMETHASONE SODIUM PHOSPHATE 4 MG/ML
INJECTION, SOLUTION INTRA-ARTICULAR; INTRALESIONAL; INTRAMUSCULAR; INTRAVENOUS; SOFT TISSUE PRN
Status: DISCONTINUED | OUTPATIENT
Start: 2023-07-05 | End: 2023-07-05

## 2023-07-05 RX ORDER — PROPOFOL 10 MG/ML
INJECTION, EMULSION INTRAVENOUS CONTINUOUS PRN
Status: DISCONTINUED | OUTPATIENT
Start: 2023-07-05 | End: 2023-07-05

## 2023-07-05 RX ORDER — HYDROMORPHONE HCL IN WATER/PF 6 MG/30 ML
0.4 PATIENT CONTROLLED ANALGESIA SYRINGE INTRAVENOUS EVERY 5 MIN PRN
Status: DISCONTINUED | OUTPATIENT
Start: 2023-07-05 | End: 2023-07-05 | Stop reason: HOSPADM

## 2023-07-05 RX ORDER — ONDANSETRON 4 MG/1
4 TABLET, ORALLY DISINTEGRATING ORAL EVERY 6 HOURS PRN
Status: DISCONTINUED | OUTPATIENT
Start: 2023-07-05 | End: 2023-07-06 | Stop reason: HOSPADM

## 2023-07-05 RX ORDER — LIDOCAINE 40 MG/G
CREAM TOPICAL
Status: DISCONTINUED | OUTPATIENT
Start: 2023-07-05 | End: 2023-07-06 | Stop reason: HOSPADM

## 2023-07-05 RX ORDER — SODIUM CHLORIDE, SODIUM LACTATE, POTASSIUM CHLORIDE, CALCIUM CHLORIDE 600; 310; 30; 20 MG/100ML; MG/100ML; MG/100ML; MG/100ML
INJECTION, SOLUTION INTRAVENOUS CONTINUOUS
Status: DISCONTINUED | OUTPATIENT
Start: 2023-07-05 | End: 2023-07-05 | Stop reason: HOSPADM

## 2023-07-05 RX ORDER — IBUPROFEN 200 MG
400 TABLET ORAL EVERY 6 HOURS PRN
COMMUNITY
End: 2024-03-18

## 2023-07-05 RX ORDER — HYDROMORPHONE HCL IN WATER/PF 6 MG/30 ML
0.2 PATIENT CONTROLLED ANALGESIA SYRINGE INTRAVENOUS
Status: DISCONTINUED | OUTPATIENT
Start: 2023-07-05 | End: 2023-07-06 | Stop reason: HOSPADM

## 2023-07-05 RX ORDER — LIDOCAINE HYDROCHLORIDE 20 MG/ML
10 SOLUTION OROPHARYNGEAL ONCE
Status: COMPLETED | OUTPATIENT
Start: 2023-07-05 | End: 2023-07-05

## 2023-07-05 RX ORDER — AMOXICILLIN 250 MG
1 CAPSULE ORAL 2 TIMES DAILY
Status: DISCONTINUED | OUTPATIENT
Start: 2023-07-05 | End: 2023-07-06 | Stop reason: HOSPADM

## 2023-07-05 RX ORDER — BISACODYL 10 MG
10 SUPPOSITORY, RECTAL RECTAL DAILY PRN
Status: DISCONTINUED | OUTPATIENT
Start: 2023-07-05 | End: 2023-07-06 | Stop reason: HOSPADM

## 2023-07-05 RX ORDER — IOPAMIDOL 755 MG/ML
90 INJECTION, SOLUTION INTRAVASCULAR ONCE
Status: COMPLETED | OUTPATIENT
Start: 2023-07-05 | End: 2023-07-05

## 2023-07-05 RX ORDER — PROCHLORPERAZINE MALEATE 5 MG
5 TABLET ORAL EVERY 6 HOURS PRN
Status: DISCONTINUED | OUTPATIENT
Start: 2023-07-05 | End: 2023-07-06 | Stop reason: HOSPADM

## 2023-07-05 RX ORDER — NALOXONE HYDROCHLORIDE 0.4 MG/ML
0.2 INJECTION, SOLUTION INTRAMUSCULAR; INTRAVENOUS; SUBCUTANEOUS
Status: DISCONTINUED | OUTPATIENT
Start: 2023-07-05 | End: 2023-07-06 | Stop reason: HOSPADM

## 2023-07-05 RX ORDER — HYDROMORPHONE HCL IN WATER/PF 6 MG/30 ML
0.4 PATIENT CONTROLLED ANALGESIA SYRINGE INTRAVENOUS
Status: DISCONTINUED | OUTPATIENT
Start: 2023-07-05 | End: 2023-07-06 | Stop reason: HOSPADM

## 2023-07-05 RX ORDER — ONDANSETRON 2 MG/ML
4 INJECTION INTRAMUSCULAR; INTRAVENOUS EVERY 30 MIN PRN
Status: DISCONTINUED | OUTPATIENT
Start: 2023-07-05 | End: 2023-07-05 | Stop reason: HOSPADM

## 2023-07-05 RX ORDER — PROCHLORPERAZINE 25 MG
12.5 SUPPOSITORY, RECTAL RECTAL EVERY 12 HOURS PRN
Status: DISCONTINUED | OUTPATIENT
Start: 2023-07-05 | End: 2023-07-06 | Stop reason: HOSPADM

## 2023-07-05 RX ORDER — CEFAZOLIN SODIUM/WATER 2 G/20 ML
2 SYRINGE (ML) INTRAVENOUS
Status: COMPLETED | OUTPATIENT
Start: 2023-07-05 | End: 2023-07-05

## 2023-07-05 RX ORDER — MAGNESIUM HYDROXIDE/ALUMINUM HYDROXICE/SIMETHICONE 120; 1200; 1200 MG/30ML; MG/30ML; MG/30ML
15 SUSPENSION ORAL ONCE
Status: COMPLETED | OUTPATIENT
Start: 2023-07-05 | End: 2023-07-05

## 2023-07-05 RX ORDER — ACETAMINOPHEN 325 MG/1
975 TABLET ORAL EVERY 8 HOURS
Status: DISCONTINUED | OUTPATIENT
Start: 2023-07-05 | End: 2023-07-06 | Stop reason: HOSPADM

## 2023-07-05 RX ORDER — CEFAZOLIN SODIUM/WATER 2 G/20 ML
2 SYRINGE (ML) INTRAVENOUS SEE ADMIN INSTRUCTIONS
Status: DISCONTINUED | OUTPATIENT
Start: 2023-07-05 | End: 2023-07-05 | Stop reason: HOSPADM

## 2023-07-05 RX ORDER — ONDANSETRON 2 MG/ML
4 INJECTION INTRAMUSCULAR; INTRAVENOUS EVERY 6 HOURS PRN
Status: DISCONTINUED | OUTPATIENT
Start: 2023-07-05 | End: 2023-07-06 | Stop reason: HOSPADM

## 2023-07-05 RX ORDER — HYDROMORPHONE HCL IN WATER/PF 6 MG/30 ML
0.2 PATIENT CONTROLLED ANALGESIA SYRINGE INTRAVENOUS EVERY 5 MIN PRN
Status: DISCONTINUED | OUTPATIENT
Start: 2023-07-05 | End: 2023-07-05 | Stop reason: HOSPADM

## 2023-07-05 RX ORDER — ONDANSETRON 2 MG/ML
INJECTION INTRAMUSCULAR; INTRAVENOUS PRN
Status: DISCONTINUED | OUTPATIENT
Start: 2023-07-05 | End: 2023-07-05

## 2023-07-05 RX ORDER — BUPIVACAINE HYDROCHLORIDE 2.5 MG/ML
INJECTION, SOLUTION INFILTRATION; PERINEURAL PRN
Status: DISCONTINUED | OUTPATIENT
Start: 2023-07-05 | End: 2023-07-05 | Stop reason: HOSPADM

## 2023-07-05 RX ORDER — DIPHENHYDRAMINE HYDROCHLORIDE 50 MG/ML
12.5 INJECTION INTRAMUSCULAR; INTRAVENOUS EVERY 6 HOURS PRN
Status: DISCONTINUED | OUTPATIENT
Start: 2023-07-05 | End: 2023-07-06 | Stop reason: HOSPADM

## 2023-07-05 RX ORDER — ACETAMINOPHEN 325 MG/1
650 TABLET ORAL EVERY 4 HOURS PRN
Status: DISCONTINUED | OUTPATIENT
Start: 2023-07-08 | End: 2023-07-06 | Stop reason: HOSPADM

## 2023-07-05 RX ORDER — OXYCODONE HYDROCHLORIDE 5 MG/1
5 TABLET ORAL EVERY 4 HOURS PRN
Status: DISCONTINUED | OUTPATIENT
Start: 2023-07-05 | End: 2023-07-06

## 2023-07-05 RX ORDER — DIPHENHYDRAMINE HCL 12.5 MG/5ML
12.5 SOLUTION ORAL EVERY 6 HOURS PRN
Status: DISCONTINUED | OUTPATIENT
Start: 2023-07-05 | End: 2023-07-06 | Stop reason: HOSPADM

## 2023-07-05 RX ORDER — NALOXONE HYDROCHLORIDE 0.4 MG/ML
0.4 INJECTION, SOLUTION INTRAMUSCULAR; INTRAVENOUS; SUBCUTANEOUS
Status: DISCONTINUED | OUTPATIENT
Start: 2023-07-05 | End: 2023-07-06 | Stop reason: HOSPADM

## 2023-07-05 RX ORDER — PIPERACILLIN SODIUM, TAZOBACTAM SODIUM 3; .375 G/15ML; G/15ML
3.38 INJECTION, POWDER, LYOPHILIZED, FOR SOLUTION INTRAVENOUS EVERY 8 HOURS
Status: DISCONTINUED | OUTPATIENT
Start: 2023-07-05 | End: 2023-07-06 | Stop reason: HOSPADM

## 2023-07-05 RX ORDER — FENTANYL CITRATE 50 UG/ML
25 INJECTION, SOLUTION INTRAMUSCULAR; INTRAVENOUS EVERY 5 MIN PRN
Status: DISCONTINUED | OUTPATIENT
Start: 2023-07-05 | End: 2023-07-05 | Stop reason: HOSPADM

## 2023-07-05 RX ORDER — FENTANYL CITRATE 50 UG/ML
50 INJECTION, SOLUTION INTRAMUSCULAR; INTRAVENOUS EVERY 5 MIN PRN
Status: DISCONTINUED | OUTPATIENT
Start: 2023-07-05 | End: 2023-07-05 | Stop reason: HOSPADM

## 2023-07-05 RX ORDER — HYDROMORPHONE HYDROCHLORIDE 1 MG/ML
0.5 INJECTION, SOLUTION INTRAMUSCULAR; INTRAVENOUS; SUBCUTANEOUS EVERY 30 MIN PRN
Status: COMPLETED | OUTPATIENT
Start: 2023-07-05 | End: 2023-07-05

## 2023-07-05 RX ORDER — LIDOCAINE HYDROCHLORIDE 10 MG/ML
INJECTION, SOLUTION INFILTRATION; PERINEURAL PRN
Status: DISCONTINUED | OUTPATIENT
Start: 2023-07-05 | End: 2023-07-05

## 2023-07-05 RX ORDER — SODIUM CHLORIDE 9 MG/ML
INJECTION, SOLUTION INTRAVENOUS CONTINUOUS
Status: DISCONTINUED | OUTPATIENT
Start: 2023-07-05 | End: 2023-07-05

## 2023-07-05 RX ORDER — DEXTROSE MONOHYDRATE, SODIUM CHLORIDE, AND POTASSIUM CHLORIDE 50; 1.49; 4.5 G/1000ML; G/1000ML; G/1000ML
INJECTION, SOLUTION INTRAVENOUS CONTINUOUS
Status: DISCONTINUED | OUTPATIENT
Start: 2023-07-05 | End: 2023-07-05

## 2023-07-05 RX ORDER — POLYETHYLENE GLYCOL 3350 17 G/17G
17 POWDER, FOR SOLUTION ORAL DAILY
Status: DISCONTINUED | OUTPATIENT
Start: 2023-07-06 | End: 2023-07-06 | Stop reason: HOSPADM

## 2023-07-05 RX ORDER — PROPOFOL 10 MG/ML
INJECTION, EMULSION INTRAVENOUS PRN
Status: DISCONTINUED | OUTPATIENT
Start: 2023-07-05 | End: 2023-07-05

## 2023-07-05 RX ORDER — ACETAMINOPHEN 325 MG/1
975 TABLET ORAL ONCE
Status: COMPLETED | OUTPATIENT
Start: 2023-07-05 | End: 2023-07-05

## 2023-07-05 RX ORDER — ONDANSETRON 4 MG/1
4 TABLET, ORALLY DISINTEGRATING ORAL EVERY 30 MIN PRN
Status: DISCONTINUED | OUTPATIENT
Start: 2023-07-05 | End: 2023-07-05 | Stop reason: HOSPADM

## 2023-07-05 RX ORDER — ANASTROZOLE 1 MG/1
1 TABLET ORAL DAILY
Status: CANCELLED | OUTPATIENT
Start: 2023-07-05

## 2023-07-05 RX ORDER — CETIRIZINE HYDROCHLORIDE 10 MG/1
10 TABLET ORAL DAILY
Status: DISCONTINUED | OUTPATIENT
Start: 2023-07-06 | End: 2023-07-06 | Stop reason: HOSPADM

## 2023-07-05 RX ORDER — HALOPERIDOL 5 MG/ML
1 INJECTION INTRAMUSCULAR
Status: DISCONTINUED | OUTPATIENT
Start: 2023-07-05 | End: 2023-07-05 | Stop reason: HOSPADM

## 2023-07-05 RX ORDER — PIPERACILLIN SODIUM, TAZOBACTAM SODIUM 3; .375 G/15ML; G/15ML
3.38 INJECTION, POWDER, LYOPHILIZED, FOR SOLUTION INTRAVENOUS ONCE
Status: COMPLETED | OUTPATIENT
Start: 2023-07-05 | End: 2023-07-05

## 2023-07-05 RX ORDER — FAMOTIDINE 20 MG/1
20 TABLET, FILM COATED ORAL 2 TIMES DAILY
Status: DISCONTINUED | OUTPATIENT
Start: 2023-07-05 | End: 2023-07-06 | Stop reason: HOSPADM

## 2023-07-05 RX ORDER — LIDOCAINE 40 MG/G
CREAM TOPICAL
Status: DISCONTINUED | OUTPATIENT
Start: 2023-07-05 | End: 2023-07-05 | Stop reason: HOSPADM

## 2023-07-05 RX ORDER — FENTANYL CITRATE 50 UG/ML
INJECTION, SOLUTION INTRAMUSCULAR; INTRAVENOUS PRN
Status: DISCONTINUED | OUTPATIENT
Start: 2023-07-05 | End: 2023-07-05

## 2023-07-05 RX ORDER — OXYCODONE HYDROCHLORIDE 5 MG/1
10 TABLET ORAL EVERY 4 HOURS PRN
Status: DISCONTINUED | OUTPATIENT
Start: 2023-07-05 | End: 2023-07-06

## 2023-07-05 RX ORDER — ONDANSETRON 2 MG/ML
4 INJECTION INTRAMUSCULAR; INTRAVENOUS EVERY 30 MIN PRN
Status: DISCONTINUED | OUTPATIENT
Start: 2023-07-05 | End: 2023-07-06 | Stop reason: HOSPADM

## 2023-07-05 RX ADMIN — DEXAMETHASONE SODIUM PHOSPHATE 4 MG: 4 INJECTION, SOLUTION INTRA-ARTICULAR; INTRALESIONAL; INTRAMUSCULAR; INTRAVENOUS; SOFT TISSUE at 15:53

## 2023-07-05 RX ADMIN — PROPOFOL 100 MG: 10 INJECTION, EMULSION INTRAVENOUS at 15:53

## 2023-07-05 RX ADMIN — Medication 2 G: at 15:46

## 2023-07-05 RX ADMIN — PHENYLEPHRINE HYDROCHLORIDE 100 MCG: 10 INJECTION INTRAVENOUS at 15:59

## 2023-07-05 RX ADMIN — HYDROMORPHONE HYDROCHLORIDE 0.4 MG: 0.2 INJECTION, SOLUTION INTRAMUSCULAR; INTRAVENOUS; SUBCUTANEOUS at 10:52

## 2023-07-05 RX ADMIN — SUGAMMADEX 200 MG: 100 INJECTION, SOLUTION INTRAVENOUS at 16:59

## 2023-07-05 RX ADMIN — ONDANSETRON 4 MG: 2 INJECTION INTRAMUSCULAR; INTRAVENOUS at 02:45

## 2023-07-05 RX ADMIN — PROPOFOL 25 MCG/KG/MIN: 10 INJECTION, EMULSION INTRAVENOUS at 15:53

## 2023-07-05 RX ADMIN — ROCURONIUM BROMIDE 30 MG: 10 INJECTION, SOLUTION INTRAVENOUS at 15:53

## 2023-07-05 RX ADMIN — PIPERACILLIN AND TAZOBACTAM 3.38 G: 3; .375 INJECTION, POWDER, LYOPHILIZED, FOR SOLUTION INTRAVENOUS at 21:13

## 2023-07-05 RX ADMIN — HYDROMORPHONE HYDROCHLORIDE 0.4 MG: 0.2 INJECTION, SOLUTION INTRAMUSCULAR; INTRAVENOUS; SUBCUTANEOUS at 14:08

## 2023-07-05 RX ADMIN — HYDROMORPHONE HYDROCHLORIDE 0.5 MG: 1 INJECTION, SOLUTION INTRAMUSCULAR; INTRAVENOUS; SUBCUTANEOUS at 16:09

## 2023-07-05 RX ADMIN — LIDOCAINE HYDROCHLORIDE 10 ML: 20 SOLUTION ORAL; TOPICAL at 02:10

## 2023-07-05 RX ADMIN — SODIUM CHLORIDE, POTASSIUM CHLORIDE, SODIUM LACTATE AND CALCIUM CHLORIDE: 600; 310; 30; 20 INJECTION, SOLUTION INTRAVENOUS at 16:45

## 2023-07-05 RX ADMIN — LIDOCAINE HYDROCHLORIDE 20 MG: 10 INJECTION, SOLUTION INFILTRATION; PERINEURAL at 15:53

## 2023-07-05 RX ADMIN — HYDROMORPHONE HYDROCHLORIDE 0.5 MG: 1 INJECTION, SOLUTION INTRAMUSCULAR; INTRAVENOUS; SUBCUTANEOUS at 06:54

## 2023-07-05 RX ADMIN — PIPERACILLIN AND TAZOBACTAM 3.38 G: 3; .375 INJECTION, POWDER, LYOPHILIZED, FOR SOLUTION INTRAVENOUS at 05:28

## 2023-07-05 RX ADMIN — FAMOTIDINE 20 MG: 20 TABLET ORAL at 20:39

## 2023-07-05 RX ADMIN — ONDANSETRON 4 MG: 2 INJECTION INTRAMUSCULAR; INTRAVENOUS at 16:08

## 2023-07-05 RX ADMIN — SODIUM CHLORIDE, POTASSIUM CHLORIDE, SODIUM LACTATE AND CALCIUM CHLORIDE: 600; 310; 30; 20 INJECTION, SOLUTION INTRAVENOUS at 15:28

## 2023-07-05 RX ADMIN — SODIUM CHLORIDE: 9 INJECTION, SOLUTION INTRAVENOUS at 10:45

## 2023-07-05 RX ADMIN — PHENYLEPHRINE HYDROCHLORIDE 100 MCG: 10 INJECTION INTRAVENOUS at 15:57

## 2023-07-05 RX ADMIN — MIDAZOLAM 1 MG: 1 INJECTION INTRAMUSCULAR; INTRAVENOUS at 15:50

## 2023-07-05 RX ADMIN — HYDROMORPHONE HYDROCHLORIDE 0.5 MG: 1 INJECTION, SOLUTION INTRAMUSCULAR; INTRAVENOUS; SUBCUTANEOUS at 04:45

## 2023-07-05 RX ADMIN — PIPERACILLIN AND TAZOBACTAM 3.38 G: 3; .375 INJECTION, POWDER, LYOPHILIZED, FOR SOLUTION INTRAVENOUS at 10:51

## 2023-07-05 RX ADMIN — HYDROMORPHONE HYDROCHLORIDE 0.5 MG: 1 INJECTION, SOLUTION INTRAMUSCULAR; INTRAVENOUS; SUBCUTANEOUS at 05:32

## 2023-07-05 RX ADMIN — FENTANYL CITRATE 100 MCG: 50 INJECTION, SOLUTION INTRAMUSCULAR; INTRAVENOUS at 15:53

## 2023-07-05 RX ADMIN — HYDROMORPHONE HYDROCHLORIDE 0.5 MG: 1 INJECTION, SOLUTION INTRAMUSCULAR; INTRAVENOUS; SUBCUTANEOUS at 16:15

## 2023-07-05 RX ADMIN — MIDAZOLAM 1 MG: 1 INJECTION INTRAMUSCULAR; INTRAVENOUS at 15:46

## 2023-07-05 RX ADMIN — ACETAMINOPHEN 975 MG: 325 TABLET ORAL at 23:52

## 2023-07-05 RX ADMIN — SENNOSIDES AND DOCUSATE SODIUM 1 TABLET: 50; 8.6 TABLET ORAL at 20:39

## 2023-07-05 RX ADMIN — ACETAMINOPHEN 975 MG: 325 TABLET ORAL at 15:20

## 2023-07-05 RX ADMIN — ALUMINUM HYDROXIDE, MAGNESIUM HYDROXIDE, AND DIMETHICONE 15 ML: 200; 20; 200 SUSPENSION ORAL at 02:10

## 2023-07-05 RX ADMIN — IOPAMIDOL 90 ML: 755 INJECTION, SOLUTION INTRAVENOUS at 03:18

## 2023-07-05 RX ADMIN — PHENYLEPHRINE HYDROCHLORIDE 100 MCG: 10 INJECTION INTRAVENOUS at 16:08

## 2023-07-05 ASSESSMENT — ACTIVITIES OF DAILY LIVING (ADL)
ADLS_ACUITY_SCORE: 35
DEPENDENT_IADLS:: INDEPENDENT
ADLS_ACUITY_SCORE: 35

## 2023-07-05 NOTE — PROGRESS NOTES
0693 patient admitted to #[id number].  Alert/orientated. JOYCE. Follows commands. Able to make needs known. Independent. Afebrile. VSS. RA. PIV-SL. C/o RUQ abdominal pain, PRN Dilaudid 0.5mg given IVP. Denies SOB/ nausea. Orientated to room, discussed possible plan for the day, answered questions at this time. NPO. Bed in low and locked position, side rails upx2, and call light within reach.

## 2023-07-05 NOTE — OP NOTE
Operative Note    Name:  Nia Prieto  PCP:  Sammie Pérez  Procedure Date:  7/5/2023      Procedure(s):  CHOLECYSTECTOMY, LAPAROSCOPIC    Pre-Procedure Diagnosis:  Acute cholecystitis [K81.0]     Post-Procedure Diagnosis:    acute cholecystitis and cholelithiasis    OR staff:  Surgeon(s):  Giorgi Stephens MD  Circulator: Alec Brizuela RN; Losi Yates RN  Scrub Person: Jose Crum      Anesthesia Type:  General    Past Medical History:   Diagnosis Date     Diabetes (H)      Ductal carcinoma in situ (DCIS) of breast with microinvasive component (H)     right     Eczema      Hypertension      Seasonal allergic rhinitis        Patient Active Problem List    Diagnosis Date Noted     Acute cholecystitis 07/05/2023     Priority: Medium     Ductal carcinoma in situ (DCIS) of right breast with microinvasive component (H) 05/09/2023     Priority: Medium     Impaired fasting glucose 02/19/2015     Priority: Medium       Findings:  Gallbladder with stones and severe amount of inflammation    Operative Report:    Consent was obtained.  The patient was taken to the operating room and placed in a supine position.  General anesthesia was administered by the anesthesia staff.  The briefing and timeout were performed in the usual fashion.  The patient was prepped and draped in a sterile manner.  An incision was made at the umbilicus and a Visiport was used to obtain access to the peritoneal cavity.  This was done under direct visualization.  The peritoneal cavity was insufflated to a pressure of 15 mmHg of CO2.  Under direct visualization 2 5 mm ports were placed in the subcostal right margin.  One 1.2 cm port was placed in the epigastric under direct visualization.  The gallbladder was retracted superiorly, adhesions were taken down to visualize the infundibular area.  Here the cystic duct and artery were isolated out with sharp and blunt dissection.  The cystic duct and artery were clipped and ligated.  The  gallbladder was then removed from the liver bed using electrocautery.  Hemostasis was obtained with electrocautery.  The gallbladder fossa was washed out with normal saline.  20 mL Marcaine was left in the liver bed area.  No bleeding was appreciated.  The trochars and CO2 were removed.  The incisions were then closed with a 4-0 Monocryl suture in a subcuticular fashion.  Steri-Strips and sterile dressings were applied.  10 mL of Marcaine were injected in the skin before leaving the OR. .  Patient was extubated and transferred to the PACU in stable condition.  All sponge and needle counts were correct.      Estimated Blood Loss:   25 ml    Specimens:    ID Type Source Tests Collected by Time Destination   1 :  Tissue Gallbladder SURGICAL PATHOLOGY EXAM Giorgi Stephens MD 7/5/2023  4:00 PM           Drains:   none    Complications:    None    Giorgi Stephens MD     Date: 7/5/2023  Time: 5:36 PM

## 2023-07-05 NOTE — ANESTHESIA CARE TRANSFER NOTE
Patient: Nia Prieto    Procedure: Procedure(s):  CHOLECYSTECTOMY, LAPAROSCOPIC       Diagnosis: Acute cholecystitis [K81.0]  Diagnosis Additional Information: No value filed.    Anesthesia Type:   General     Note:    Oropharynx: oropharynx clear of all foreign objects and spontaneously breathing  Level of Consciousness: awake  Oxygen Supplementation: face mask  Level of Supplemental Oxygen (L/min / FiO2): 6  Independent Airway: airway patency satisfactory and stable  Dentition: dentition unchanged  Vital Signs Stable: post-procedure vital signs reviewed and stable  Report to RN Given: handoff report given  Patient transferred to: Medical/Surgical Unit    Handoff Report: Identifed the Patient, Identified the Reponsible Provider, Reviewed the pertinent medical history, Discussed the surgical course, Reviewed Intra-OP anesthesia mangement and issues during anesthesia, Set expectations for post-procedure period and Allowed opportunity for questions and acknowledgement of understanding      Vitals:  Vitals Value Taken Time   /70 07/05/23 1720   Temp 36.2  C (97.1  F) 07/05/23 1718   Pulse 72 07/05/23 1720   Resp 3 07/05/23 1720   SpO2 98 % 07/05/23 1720   Vitals shown include unvalidated device data.    Electronically Signed By: JUAN FRANCISCO Solorio CRNA  July 5, 2023  5:21 PM

## 2023-07-05 NOTE — ANESTHESIA PROCEDURE NOTES
Airway       Patient location during procedure: OR       Procedure Start/Stop Times: 7/5/2023 3:55 PM and 7/5/2023 3:57 PM  Staff -        CRNA: Berhane West APRN CRNA       Performed By: CRNA  Consent for Airway        Urgency: emergent  Indications and Patient Condition       Indications for airway management: monika-procedural       Induction type:intravenous       Mask difficulty assessment: 1 - vent by mask    Final Airway Details       Final airway type: endotracheal airway       Successful airway: ETT - single  Endotracheal Airway Details        ETT size (mm): 7.0       Cuffed: yes       Successful intubation technique: direct laryngoscopy       DL Blade Type: Arredondo 2       Grade View of Cords: 1       Adjucts: stylet       Position: Right       Measured from: lips       Secured at (cm): 22    Post intubation assessment        Placement verified by: capnometry        Number of attempts at approach: 1       Secured with: silk tape       Ease of procedure: easy       Dentition: Intact       Dental guard used and removed. Dental Guard Type: Proguard Red.    Medication(s) Administered   Medication Administration Time: 7/5/2023 3:55 PM

## 2023-07-05 NOTE — CONSULTS
General Surgery Consultation  Nia Prieto MRN# [id number]   Age/Sex: 72 year old female YOB: 1950     Reason for consult: 1. Acute cholecystitis            Requesting physician: Dr Ibrahim                   Assessment and Plan:   Assessment:  Acute cholecystitis    Plan:  Discussed pathophysiology and potential postop complications with patient.  Plan for laparoscopic cholecystectomy later this afternoon.  Thank you for consulting us involving us in her care.          Chief Complaint:     Chief Complaint   Patient presents with     Abdominal Pain        History is obtained from the patient    HPI:   Nia Prieto is a 72 year old female history of type 2 diabetes, right breast lumpectomy for breast cancer with planned radiation, hypertension, no abdominal surgeries who presents with acute right upper quadrant abdominal pain.  Pain started 2 days ago and continues to have right upper quad abdominal pain that radiates to her back and right side.  Nausea without vomiting and low appetite.  Eating makes it worse.          Past Medical History:     Past Medical History:   Diagnosis Date     Diabetes (H)      Ductal carcinoma in situ (DCIS) of breast with microinvasive component (H)     right     Eczema      Hypertension      Seasonal allergic rhinitis               Past Surgical History:     Past Surgical History:   Procedure Laterality Date     CATARACT EXTRACTION Bilateral      COLONOSCOPY       EYE SURGERY       LUMPECTOMY BREAST Right 6/8/2023    Procedure: BREAST TAG LOCALIZED LUMPECTOMY WITH SENTINEL LYMPH NODE BIOPSY;  Surgeon: Lillian Marie DO;  Location: [facility]             Social History:    reports that she has never smoked. She has never used smokeless tobacco. She reports that she does not currently use alcohol. She reports that she does not use drugs.           Family History:     Family History   Problem Relation Age of Onset     Cancer Mother      Kidney Cancer Mother       "Hypertension Father      Anxiety Disorder Sister      Anxiety Disorder Brother      Breast Cancer Paternal Grandmother      Anesthesia Reaction No family hx of               Allergies:   No Known Allergies           Medications:     Prior to Admission medications    Medication Sig Start Date End Date Taking? Authorizing Provider   cetirizine (ZYRTEC) 10 MG tablet Take 10 mg by mouth daily   Yes Reported, Patient   hydrocortisone (WESTCORT) 0.2 % external cream Apply topically 2 times daily as needed (eczema) 6/5/23  Yes Sammie Pérez MD   ibuprofen (ADVIL/MOTRIN) 200 MG tablet Take 400 mg by mouth every 6 hours as needed for pain   Yes Unknown, Entered By History   L. rhamnosus GG/inulin (CULTURELLE PROBIOTICS ORAL) Take 1 capsule by mouth daily as needed 3/4/19  Yes Provider, Historical   anastrozole (ARIMIDEX) 1 MG tablet Take 1 tablet (1 mg) by mouth daily 6/22/23   Lennie Herrera MD              Review of Systems:   The Review of Systems is negative other than noted in the HPI            Physical Exam:     Patient Vitals for the past 24 hrs:   BP Temp Temp src Pulse Resp SpO2 Height Weight   07/05/23 0800 137/69 98.7  F (37.1  C) Oral -- -- 94 % -- --   07/05/23 0645 (!) 159/78 98.8  F (37.1  C) Oral 84 20 96 % 1.626 m (5' 4\") 70.2 kg (154 lb 12.8 oz)   07/05/23 0630 (!) 148/81 -- -- 81 25 96 % -- --   07/05/23 0600 (!) 152/69 -- -- 72 26 97 % -- --   07/05/23 0530 (!) 156/89 -- -- 84 25 98 % -- --   07/05/23 0500 (!) 155/67 -- -- 78 19 97 % -- --   07/05/23 0300 (!) 191/90 -- -- 80 20 -- -- --   07/05/23 0245 (!) 172/87 -- -- 73 27 -- -- --   07/05/23 0047 (!) 184/103 98.4  F (36.9  C) Oral 105 16 96 % 1.626 m (5' 4\") 73.5 kg (162 lb)        No intake or output data in the 24 hours ending 07/05/23 1059   Constitutional:   awake, alert, cooperative, no apparent distress, and appears stated age       Eyes:   PERRL, conjunctiva/corneas clear, EOM's intact; no scleral edema or icterus noted        ENT:   " Normocephalic, without obvious abnormality, atraumatic, Lips, mucosa, and tongue normal        Hematologic / Lymphatic:   No lymphadenopathy       Lungs:   Normal respiratory effort, no accessory muscle use       Cardiovascular:   Regular rate and rhythm       Abdomen:   Soft tender right upper quadrant       Musculoskeletal:   No obvious swelling, bruising or deformity       Skin:   Skin color and texture normal for patient, no rashes or lesions              Data:         All imaging studies reviewed by me.  Reviewed images as well as radiology reports    Results for orders placed or performed during the hospital encounter of 07/05/23 (from the past 24 hour(s))   ECG 12-LEAD WITH MUSE (L[facility])   Result Value Ref Range    Systolic Blood Pressure 117 mmHg    Diastolic Blood Pressure 67 mmHg    Ventricular Rate 80 BPM    Atrial Rate 80 BPM    DC Interval 120 ms    QRS Duration 90 ms     ms    QTc 442 ms    P Axis 60 degrees    R AXIS 55 degrees    T Axis 57 degrees    Interpretation ECG       Sinus rhythm  Normal ECG  No previous ECGs available  Confirmed by SEE ED PROVIDER NOTE FOR, ECG INTERPRETATION ([id number]),  PETEY BROWN ([id number]) on 7/5/2023 7:57:36 AM     CBC with platelets differential    Narrative    The following orders were created for panel order CBC with platelets differential.  Procedure                               Abnormality         Status                     ---------                               -----------         ------                     CBC with platelets and d...[[id number]]  Abnormal            Final result                 Please view results for these tests on the individual orders.   Comprehensive metabolic panel   Result Value Ref Range    Sodium 135 (L) 136 - 145 mmol/L    Potassium 4.4 3.5 - 5.0 mmol/L    Chloride 101 98 - 107 mmol/L    Carbon Dioxide (CO2) 24 22 - 31 mmol/L    Anion Gap 10 5 - 18 mmol/L    Urea Nitrogen 10 8 - 28 mg/dL    Creatinine 0.72 0.60 - 1.10 mg/dL     Calcium 9.9 8.5 - 10.5 mg/dL    Glucose 157 (H) 70 - 125 mg/dL    Alkaline Phosphatase 57 45 - 120 U/L    AST 12 0 - 40 U/L    ALT <9 0 - 45 U/L    Protein Total 7.5 6.0 - 8.0 g/dL    Albumin 4.0 3.5 - 5.0 g/dL    Bilirubin Total 0.5 0.0 - 1.0 mg/dL    GFR Estimate 89 >60 mL/min/1.73m2   Lipase   Result Value Ref Range    Lipase 11 0 - 52 U/L   Troponin I   Result Value Ref Range    Troponin I <0.01 0.00 - 0.29 ng/mL   CBC with platelets and differential   Result Value Ref Range    WBC Count 14.3 (H) 4.0 - 11.0 10e3/uL    RBC Count 4.68 3.80 - 5.20 10e6/uL    Hemoglobin 13.6 11.7 - 15.7 g/dL    Hematocrit 42.2 35.0 - 47.0 %    MCV 90 78 - 100 fL    MCH 29.1 26.5 - 33.0 pg    MCHC 32.2 31.5 - 36.5 g/dL    RDW 13.2 10.0 - 15.0 %    Platelet Count 254 150 - 450 10e3/uL    % Neutrophils 87 %    % Lymphocytes 4 %    % Monocytes 9 %    % Eosinophils 0 %    % Basophils 0 %    % Immature Granulocytes 0 %    NRBCs per 100 WBC 0 <1 /100    Absolute Neutrophils 12.3 (H) 1.6 - 8.3 10e3/uL    Absolute Lymphocytes 0.6 (L) 0.8 - 5.3 10e3/uL    Absolute Monocytes 1.3 0.0 - 1.3 10e3/uL    Absolute Eosinophils 0.0 0.0 - 0.7 10e3/uL    Absolute Basophils 0.0 0.0 - 0.2 10e3/uL    Absolute Immature Granulocytes 0.1 <=0.4 10e3/uL    Absolute NRBCs 0.0 10e3/uL   CTA Chest Abdomen Pelvis w Contrast    Narrative    EXAM: CTA CHEST ABDOMEN PELVIS W CONTRAST  LOCATION: [facility]  DATE: 7/5/2023    INDICATION: Chest pain. Evaluate for dissection.  COMPARISON: Chest x-ray 2 views 11/08/2018 at 1539 hours.  TECHNIQUE: CT angiogram chest abdomen pelvis during arterial phase of injection of IV contrast. 2D and 3D MIP reconstructions were performed by the CT technologist. Dose reduction techniques were used.   CONTRAST: 90 mL Isovue 370 IV.    FINDINGS:   CT ANGIOGRAM CHEST, ABDOMEN, AND PELVIS: Mildly atherosclerotic thoracic arch. Ectatic ascending thoracic segment measuring 3.8 x 3.8 cm (image 39, series 6). No  aneurysm or dissection in the chest, abdomen and pelvis. Normal caliber great vessels   arising from the thoracic arch. Mesenteric and bilateral renal arteries are widely patent. Common iliac arteries and downstream branches are widely patent to the lesser trochanters. No pulmonary emboli on either side.    LUNGS AND PLEURA: Mild scarring in the apices. Tiny calcified granuloma right upper lobe posteriorly. Mild diffuse thickening of the bronchi. Subtle mosaic attenuation bilaterally suggests small airways or small vessel disease. No pleural effusion on   either side.    MEDIASTINUM/AXILLAE: Normal cardiac size. No pericardial effusion. No suspicious adenopathy. Moderate hiatal hernia behind the heart. Trachea is midline.    CORONARY ARTERY CALCIFICATION: None.    HEPATOBILIARY: Multiple gallstones within the gallbladder which is distended. Mild wall thickening and adjacent soft tissue stranding, worrisome for ongoing acute cholecystitis. Recommend targeted ultrasound of the gallbladder for further assessment.   Diffusely fatty infiltrated liver without discrete lesion. Small amount of perihepatic ascites.    PANCREAS: Normal.    SPLEEN: Normal.    ADRENAL GLANDS: Normal.    KIDNEYS/BLADDER: No urinary tract calculi. Both kidneys are well perfused without hydronephrosis or hydroureter. Partially distended normal urinary bladder.    BOWEL: Colonic diverticulosis, more apparent distally at the redundant rectosigmoid, without acute inflammation. Normal appendix. Moderate hiatal hernia. No mechanical bowel obstruction or free gas.    LYMPH NODES: No suspicious abdominopelvic adenopathy.    PELVIC ORGANS: Retroverted fibroid uterus. No adenopathy or free fluid. Rectosigmoid diverticulosis. Normal appendix.    MUSCULOSKELETAL: Mild degenerative changes both shoulders, spine and joints of the pelvis. Mild right convex thoracic curve.      Impression    IMPRESSION:  1.  Mildly atherosclerotic thoracic arch. Ectatic  ascending segment. No aortic aneurysm or dissection in the chest, abdomen and pelvis. Downstream branches are widely patent.    2.  No pulmonary emboli on either side. Mild scarring in the apices. Evidence of remote granulomatous disease. Mild diffuse thickening of the bronchi. Subtle mosaic attenuation bilaterally suggests small airways or small vessel disease. No adenopathy or   effusion.     3.  The gallbladder is distended containing multiple stones. Wall thickening and adjacent soft tissue stranding, worrisome for ongoing acute cholecystitis. Recommend targeted ultrasound of the gallbladder for further assessment. Diffusely fatty   infiltrated liver without discrete lesion. Small amount of perihepatic ascites.    4.  Colonic diverticulosis, more apparent distally at the rectosigmoid, without acute inflammation, mechanical obstruction, free gas or free fluid. Moderate hiatal hernia. Normal appendix.    5.  No urinary tract calculi or obstruction. Retroverted fibroid uterus.    6.  Mild degenerative changes both shoulders, spine and joints of the pelvis. Mild right convex thoracic curve.    NOTE: ABNORMAL REPORT    THE DICTATION ABOVE DESCRIBES AN ABNORMALITY FOR WHICH FOLLOW-UP IS NEEDED.      US Abdomen Limited (RUQ)    Narrative    EXAM: US ABDOMEN LIMITED  LOCATION: [facility]  DATE: 7/5/2023    INDICATION: ? cholecystitis.   abd pain  COMPARISON: None.  TECHNIQUE: Limited abdominal ultrasound.    FINDINGS:    GALLBLADDER: Multiple gallstones and sludge are seen within the gallbladder with a positive sonographic Gould's sign reported during the exam. Pericholecystic fluid and gallbladder wall thickening are noted, with the gallbladder wall measuring 6 mm in   diameter.    BILE DUCTS: No biliary dilatation. The common duct measures 4 mm.    LIVER: Normal parenchyma with smooth contour. No focal mass.    RIGHT KIDNEY: No hydronephrosis.    PANCREAS: The visualized portions are  normal.    No ascites.      Impression    IMPRESSION:  1.  Cholelithiasis with gallbladder wall thickening up to 6 mm in diameter, pericholecystic fluid and a positive sonographic Gould's sign reported during the exam, consistent with acute cholecystitis.            SINDI Pruett  [facility]  [address] [place] [id number]  Phone- [phone]  Fax- [phone]

## 2023-07-05 NOTE — ED PROVIDER NOTES
EMERGENCY DEPARTMENT ENCOUNTER      NAME: Nia Prieto  AGE: 72 year old female  YOB: 1950  MRN: [id number]  EVALUATION DATE & TIME: 7/5/2023  1:06 AM    PCP: Sammie Pérez    ED PROVIDER: Kt Webber M.D.      Chief Complaint   Patient presents with     Abdominal Pain         FINAL IMPRESSION:  1. Acute cholecystitis          ED COURSE & MEDICAL DECISION MAKING:    Pertinent Labs & Imaging studies reviewed. (See chart for details)  72 year old female presents to the Emergency Department for evaluation of abdominal pain.  This pain is lower chest/abdominal in.  Goes back towards her back.  Initial concern for possible dissection versus esophageal injury.  No signs of esophageal obstruction.  CT scan is done this does not show any dissection.  There is some ectasia of the aorta but do not think this is causing her symptoms.  Does not seem to be cardiac disease has been there for a couple of days.  EKG is normal.  Troponins negative.  CT scan did note possible cholecystitis.  White count elevated but LFTs and lipase are normal.  Did get ultrasound that does confirm acute cholecystitis.  Given this patient will be admitted.  We will discuss with surgery.  Patient is symptoms improving with IV Dilaudid.    1:23 AM I met with the patient to gather history and to perform my initial exam. I discussed the plan for care while in the Emergency Department. PPE: gloves.  4:06 AM I rechecked and updated patient on results. Discussed plan for admission.  5:11 AM I discussed case with Dr. Ibrahim, hospitalist, who accepts the patient for admission.  6:13 AM discussed with Dr. Stephens from surgery.  Patient will be seen by surgery later today to discuss management of cholecystitis      At the conclusion of the encounter I discussed the results of all of the tests and the disposition. The questions were answered. The patient or family acknowledged understanding and was agreeable with the care plan.      Medical Decision Making    History:    Supplemental history from: Documented in chart, if applicable    External Record(s) reviewed: Documented in chart, if applicable.    Work Up:    Chart documentation includes differential considered and any EKGs or imaging independently interpreted by provider, where specified.    In additional to work up documented, I considered the following work up: Documented in chart, if applicable.    External consultation:    Discussion of management with another provider: Documented in chart, if applicable    Complicating factors:    Care impacted by chronic illness: Cancer/Chemotherapy, Diabetes and Hypertension    Care affected by social determinants of health: Access to Medical Care    Disposition considerations: Admit.        0 minutes of critical care time     MEDICATIONS GIVEN IN THE EMERGENCY:  Medications   ondansetron (ZOFRAN) injection 4 mg (4 mg Intravenous $Given 7/5/23 0245)   HYDROmorphone (PF) (DILAUDID) injection 0.5 mg (0.5 mg Intravenous $Given 7/5/23 0532)   alum & mag hydroxide-simethicone (MAALOX) suspension 15 mL (15 mLs Oral $Given 7/5/23 0210)   lidocaine (viscous) (XYLOCAINE) 2 % solution 10 mL (10 mLs Mouth/Throat $Given 7/5/23 0210)   iopamidol (ISOVUE-370) solution 90 mL (90 mLs Intravenous $Given 7/5/23 0318)   piperacillin-tazobactam (ZOSYN) 3.375 g vial to attach to  mL bag (3.375 g Intravenous $New Bag 7/5/23 0528)       NEW PRESCRIPTIONS STARTED AT TODAY'S ER VISIT  New Prescriptions    No medications on file          =================================================================    HPI    Patient information was obtained from: Patient    Use of : N/A        Nia Prieto is a 72 year old female with a pertinent history of HTN, diabetes, and ductal carcinoma in situ of breast with microinvasive component, who presents to this ED via walk-in for evaluation of abdominal pain.  Patient's had 2 days of pain.  This is in her mid  abdomen.  She states it radiates to her back and to her right side.  No history of heart disease.  Has some nausea but no vomiting.  Has not had much bowel movement and over the last 2 days does not eat much.  States she has not felt like eating.  Eat food seems to make it worse.  She has been able to drink fluids.  He is currently undergoing treatment for breast cancer.  Has not had previous symptoms in the past.  No cardiac history.  No shortness of breath.  No diaphoresis or fever    Per chart review, patient underwent right breast tag localized lumpectomy with sentinel lymph node biopsy on 6/8/23. Patient followed up with surgery on 6/14/23 at [facility] in [place]. Patient is Grade II, T1, N0, ER/IL+, HER2. Pathologic prognostic stage IA. Referral was placed for radiation oncology. Patient was seen at [facility] in [place] on 6/22/23 for invasive ductal carcinoma of breast, stage 1. Patient  was found a new symmetry by screening mammogram compared to the one prior from 2018. Subsequent work-up showed microinvasive carcinoma, ER positive, IL positive and HER2 negative. Patient was seen at [facility] in [place] on 6/27/23 for invasive ductal carcinoma of right breast. Discussed role of adjuvant radiation therapy following lumpectomy. Radiation therapy offered to patient and she was willing to proceed. Patient is scheduled to return to radiation oncology in one week for simulation. Current plan to give patient radiation therapy to a total dose of 4256 cGy in 16 fractions with a subsequent 4 boosts to the lumpectomy cavity for a total of 5256 cGy in 20 fractions.         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 Main OR           CURRENT MEDICATIONS:    Current Facility-Administered Medications   Medication     HYDROmorphone (PF) (DILAUDID) injection 0.5 mg     ondansetron (ZOFRAN) injection 4 mg     Current Outpatient Medications   Medication     anastrozole (ARIMIDEX) 1 MG tablet     cetirizine (ZYRTEC) 10 MG tablet     hydrocortisone (WESTCORT) 0.2 % external cream     L. rhamnosus GG/inulin (CULTURELLE PROBIOTICS ORAL)         ALLERGIES:  No Known Allergies    FAMILY HISTORY:  Family History   Problem Relation Age of Onset     Cancer Mother      Kidney Cancer Mother      Hypertension Father      Anxiety Disorder Sister      Anxiety Disorder Brother      Breast Cancer Paternal Grandmother      Anesthesia Reaction No family hx of        SOCIAL HISTORY:   Social History     Socioeconomic History     Marital status:    Tobacco Use     Smoking status: Never     Smokeless tobacco: Never   Vaping Use     Vaping Use: Never used   Substance and Sexual Activity     Alcohol use: Not Currently     Drug use: Never       VITALS:  BP (!) 156/89   Pulse 84   Temp 98.4  F (36.9  C) (Oral)   Resp 25   Ht 1.626 m (5' 4\")   Wt 73.5 kg (162 lb)   SpO2 98%   BMI 27.81 kg/m      PHYSICAL EXAM    Physical Exam  Vitals and nursing note reviewed.   Constitutional:       General: She is not in acute distress.     Appearance: She is not diaphoretic.   HENT:      Head: Atraumatic.      Mouth/Throat:      Pharynx: No oropharyngeal exudate.   Eyes:      General: No scleral icterus.     Pupils: Pupils are equal, round, and reactive to light.   Cardiovascular:      Rate and Rhythm: Normal rate and regular rhythm.      Heart sounds: Normal heart sounds.   Pulmonary:      " Effort: No respiratory distress.      Breath sounds: Normal breath sounds.   Abdominal:      Palpations: Abdomen is soft.      Tenderness: There is abdominal tenderness in the right upper quadrant. There is no guarding or rebound. Negative signs include Gould's sign.   Musculoskeletal:         General: No tenderness.   Skin:     General: Skin is warm.      Findings: No rash.   Neurological:      General: No focal deficit present.      Mental Status: She is alert.           LAB:  All pertinent labs reviewed and interpreted.  Labs Ordered and Resulted from Time of ED Arrival to Time of ED Departure   COMPREHENSIVE METABOLIC PANEL - Abnormal       Result Value    Sodium 135 (*)     Potassium 4.4      Chloride 101      Carbon Dioxide (CO2) 24      Anion Gap 10      Urea Nitrogen 10      Creatinine 0.72      Calcium 9.9      Glucose 157 (*)     Alkaline Phosphatase 57      AST 12      ALT <9      Protein Total 7.5      Albumin 4.0      Bilirubin Total 0.5      GFR Estimate 89     CBC WITH PLATELETS AND DIFFERENTIAL - Abnormal    WBC Count 14.3 (*)     RBC Count 4.68      Hemoglobin 13.6      Hematocrit 42.2      MCV 90      MCH 29.1      MCHC 32.2      RDW 13.2      Platelet Count 254      % Neutrophils 87      % Lymphocytes 4      % Monocytes 9      % Eosinophils 0      % Basophils 0      % Immature Granulocytes 0      NRBCs per 100 WBC 0      Absolute Neutrophils 12.3 (*)     Absolute Lymphocytes 0.6 (*)     Absolute Monocytes 1.3      Absolute Eosinophils 0.0      Absolute Basophils 0.0      Absolute Immature Granulocytes 0.1      Absolute NRBCs 0.0     LIPASE - Normal    Lipase 11     TROPONIN I - Normal    Troponin I <0.01         RADIOLOGY:  Reviewed all pertinent imaging. Please see official radiology report.  US Abdomen Limited (RUQ)   Final Result   IMPRESSION:   1.  Cholelithiasis with gallbladder wall thickening up to 6 mm in diameter, pericholecystic fluid and a positive sonographic Gould's sign reported  during the exam, consistent with acute cholecystitis.            CTA Chest Abdomen Pelvis w Contrast   Final Result   IMPRESSION:   1.  Mildly atherosclerotic thoracic arch. Ectatic ascending segment. No aortic aneurysm or dissection in the chest, abdomen and pelvis. Downstream branches are widely patent.      2.  No pulmonary emboli on either side. Mild scarring in the apices. Evidence of remote granulomatous disease. Mild diffuse thickening of the bronchi. Subtle mosaic attenuation bilaterally suggests small airways or small vessel disease. No adenopathy or    effusion.       3.  The gallbladder is distended containing multiple stones. Wall thickening and adjacent soft tissue stranding, worrisome for ongoing acute cholecystitis. Recommend targeted ultrasound of the gallbladder for further assessment. Diffusely fatty    infiltrated liver without discrete lesion. Small amount of perihepatic ascites.      4.  Colonic diverticulosis, more apparent distally at the rectosigmoid, without acute inflammation, mechanical obstruction, free gas or free fluid. Moderate hiatal hernia. Normal appendix.      5.  No urinary tract calculi or obstruction. Retroverted fibroid uterus.      6.  Mild degenerative changes both shoulders, spine and joints of the pelvis. Mild right convex thoracic curve.      NOTE: ABNORMAL REPORT      THE DICTATION ABOVE DESCRIBES AN ABNORMALITY FOR WHICH FOLLOW-UP IS NEEDED.              EKG:    Performed at: 150  Impression: Normal EKG.  No previous  Normal sinus rhythm with a rate of 80.  .  QRS 90.  QTc 442    I have independently reviewed and interpreted the EKG(s) documented above.          I, Darlin Bae, am serving as a scribe to document services personally performed by Dr. Kt Webber, based on my observation and the provider's statements to me. I, Kt Webber MD attest that Darlin Bae is acting in a scribe capacity, has observed my performance of the services and has documented them  in accordance with my direction.    Kt Webber M.D.  Emergency Medicine  [facility] EMERGENCY ROOM  [address] [address] [phone]  Dept: [phone]     Kt Webber MD  07/05/23 0610       Kt Webber MD  07/05/23 0614

## 2023-07-05 NOTE — ED TRIAGE NOTES
Pt reports feeling like something is stuck in her esophagus near her diaphragm. Has had the sensation for the last couple of days, but has become worse. Able to eat soft foods and drink fluids.     Triage Assessment     Row Name 07/05/23 0049       Triage Assessment (Adult)    Airway WDL WDL       Respiratory WDL    Respiratory WDL WDL       Skin Circulation/Temperature WDL    Skin Circulation/Temperature WDL WDL       Cardiac WDL    Cardiac WDL WDL       Peripheral/Neurovascular WDL    Peripheral Neurovascular WDL WDL       Cognitive/Neuro/Behavioral WDL    Cognitive/Neuro/Behavioral WDL WDL

## 2023-07-05 NOTE — PHARMACY-ADMISSION MEDICATION HISTORY
Pharmacist Admission Medication History    Admission medication history is complete. The information provided in this note is only as accurate as the sources available at the time of the update.    Medication reconciliation/reorder completed by provider prior to medication history? No    Information Source(s): Patient and CareEverywhere/SureScripts via in-person    Pertinent Information: hasn't started arimidex yet; plan is to start after radiation    Changes made to PTA medication list:    Added: ibuprofen    Deleted: None    Changed: None    Medication Affordability:  Not including over the counter (OTC) medications, was there a time in the past 3 months when you did not take your medications as prescribed because of cost?: No    Allergies reviewed with patient and updates made in EHR: yes    Medication History Completed By: An [facility] 7/5/2023 8:30 AM    Prior to Admission medications    Medication Sig Last Dose Taking? Auth Provider Long Term End Date   cetirizine (ZYRTEC) 10 MG tablet Take 10 mg by mouth daily 7/4/2023 at am Yes Reported, Patient     hydrocortisone (WESTCORT) 0.2 % external cream Apply topically 2 times daily as needed (eczema) prn at prn Yes Sammie Pérez MD     ibuprofen (ADVIL/MOTRIN) 200 MG tablet Take 400 mg by mouth every 6 hours as needed for pain 7/4/2023 at pm Yes Unknown, Entered By History     L. rhamnosus GG/inulin (CULTURELLE PROBIOTICS ORAL) Take 1 capsule by mouth daily as needed 7/2/2023 at am Yes Provider, Historical     anastrozole (ARIMIDEX) 1 MG tablet Take 1 tablet (1 mg) by mouth daily to start after radiation  Sharon, Lennie Hogue MD Yes

## 2023-07-05 NOTE — CONSULTS
Care Management Initial Consult    General Information  Assessment completed with: Patient,    Type of CM/SW Visit: Initial Assessment    Primary Care Provider verified and updated as needed: Yes   Readmission within the last 30 days:     Reason for Consult: discharge planning  Advance Care Planning:          Communication Assessment  Patient's communication style: spoken language (English or Bilingual)        Cognitive  Cognitive/Neuro/Behavioral: WDL                      Living Environment:   People in home: spouse  Kaushik  Current living Arrangements: house      Able to return to prior arrangements: yes     Family/Social Support:  Care provided by: self  Provides care for: no one  Marital Status:   , Other (specify) (friends, neighbors)  Kaushik       Description of Support System: Supportive, Involved    Support Assessment: Adequate family and caregiver support    Current Resources:   Patient receiving home care services: No     Community Resources: None  Equipment currently used at home:    Supplies currently used at home: None    Employment/Financial:  Employment Status: retired        Financial Concerns: No concerns identified   Referral to Financial Worker: No     Does the patient's insurance plan have a 3 day qualifying hospital stay waiver?      Lifestyle & Psychosocial Needs:  Social Determinants of Health     Tobacco Use: Low Risk  (7/5/2023)    Patient History      Smoking Tobacco Use: Never      Smokeless Tobacco Use: Never      Passive Exposure: Not on file   Alcohol Use: Not on file   Financial Resource Strain: Not on file   Food Insecurity: Not on file   Transportation Needs: Not on file   Physical Activity: Not on file   Stress: Not on file   Social Connections: Not on file   Intimate Partner Violence: Not on file   Depression: Not at risk (3/9/2023)    PHQ-2      PHQ-2 Score: 0   Housing Stability: Not on file     Functional Status:  Prior to admission patient needed assistance:    Dependent ADLs:: Independent  Dependent IADLs:: Independent     Mental Health Status:  Mental Health Status: No Current Concerns       Chemical Dependency Status:  Chemical Dependency Status: No Current Concerns           Values/Beliefs:  Spiritual, Cultural Beliefs, Mu-ism Practices, Values that affect care:               Additional Information:  Met with patient to review role of care management, progression of care and possible need for services at discharge, including OP services, home care, or skilled nursing care.   Pt reports she resides with her spouse, Kaushik in a 2-level townhouse.  She states she is retired, and independent with ADLs/IADL, including driving.  She anticipate returning home without need for any coordination of services, and feels she has a lot of support from her , friends and neighbors.  Her spouse will provide transport.     Surgery is planning Laparoscopic Cholecystectomy today.  Pt states she was informed this would be at 1600 so would likely stay overnight in hospital and discharge tomorrow.  SAMAYOA paperwork reviewed and signed by pt.  Anticipate discharge home with family with OP f/u.     Rebekah Mcnulty RN

## 2023-07-05 NOTE — H&P
"[facility] MEDICINE ADMISSION HISTORY AND PHYSICAL     Brief Synopsis:     Nia Prieto is a 72 year old female who presented with complaints of feeling like something is stuck in her esophagus near her diaphragm, abdominal pain.    Medical history is notable for essential hypertension, breast cancer, diabetes.    Initial evaluation revealed elevated blood pressure, normal basic metabolic panel and LFTs, elevated white count of 14.3.  CTA chest abdomen pelvis with distended gallbladder containing stones, wall thickening.  Right upper quadrant ultrasound consistent with acute cholecystitis.  EKG was sinus rhythm, no acute ischemia.    Initial treatment included Maalox, viscous lidocaine, Zosyn, Dilaudid, Zofran.    Assessment and Plan:  Acute cholecystitis  N.p.o., Zosyn, fluids, surgery consult    Breast cancer  Underwent lumpectomy, pending radiation, on adjuvant hormone therapy      Clinically Significant Risk Factors Present on Admission                       # Overweight: Estimated body mass index is 27.81 kg/m  as calculated from the following:    Height as of this encounter: 1.626 m (5' 4\").    Weight as of this encounter: 73.5 kg (162 lb).              DVTP: Mechanical Prophylaxis/ Sequential Compression Devices  Code Status: No Order  Disposition: Observation   Diet: N.p.o.  Fluids: Normal saline at 75/h    Disposition Plan      Expected Discharge Date: 07/06/2023               Chief Complaint  abdominal pain, poor appetite     HISTORY   Nia Prieto is a 72 year old female who presented with complaints of trouble swallowing.    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  Patient's had 2 days of pain.  This is in her mid abdomen.  She states it radiates to her back and to her " right side.  No history of heart disease.  Has some nausea but no vomiting.  Has not had much bowel movement and over the last 2 days does not eat much.  States she has not felt like eating.  Eat food seems to make it worse.  She has been able to drink fluids.  He is currently undergoing treatment for breast cancer.  Has not had previous symptoms in the past.  No cardiac history.  No shortness of breath.  No diaphoresis or fever    Doing well at the time of my encounter.   Past Medical History     Past Medical History:  No date: Diabetes (H)  No date: Ductal carcinoma in situ (DCIS) of breast with microinvasive   component (H)      Comment:  right  No date: Eczema  No date: Hypertension  No date: Seasonal allergic rhinitis     Surgical History     Past Surgical History:   Procedure Laterality Date     CATARACT EXTRACTION Bilateral      COLONOSCOPY       EYE SURGERY       LUMPECTOMY BREAST Right 6/8/2023    Procedure: BREAST TAG LOCALIZED LUMPECTOMY WITH SENTINEL LYMPH NODE BIOPSY;  Surgeon: Lillian Marie DO;  Location: [facility] Main OR     Family History      Family History   Problem Relation Age of Onset     Cancer Mother      Kidney Cancer Mother      Hypertension Father      Anxiety Disorder Sister      Anxiety Disorder Brother      Breast Cancer Paternal Grandmother      Anesthesia Reaction No family hx of       Social History      Social History     Tobacco Use     Smoking status: Never     Smokeless tobacco: Never   Vaping Use     Vaping Use: Never used   Substance Use Topics     Alcohol use: Not Currently     Drug use: Never      Allergies   No Known Allergies  Prior to Admission Medications      Prior to Admission Medications   Prescriptions Last Dose Informant Patient Reported? Taking?   L. rhamnosus GG/inulin (CULTURELLE PROBIOTICS ORAL)   Yes No   anastrozole (ARIMIDEX) 1 MG tablet   No No   Sig: Take 1 tablet (1 mg) by mouth daily   Patient not taking: Reported on 6/27/2023   cetirizine (ZYRTEC) 10 MG  tablet   Yes No   Sig: Take 10 mg by mouth daily   hydrocortisone (WESTCORT) 0.2 % external cream   No No   Sig: Apply topically 2 times daily as needed (eczema)   Patient not taking: Reported on 6/22/2023      Facility-Administered Medications: None      Review of Systems     A 12 point comprehensive review of systems was negative except as noted above in HPI.    PHYSICAL EXAMINATION     Vitals      Temp:  [98.4  F (36.9  C)] 98.4  F (36.9  C)  Pulse:  [] 84  Resp:  [16-27] 25  BP: (155-191)/() 156/89  SpO2:  [96 %-98 %] 98 %    Examination   Physical Exam:    Gen: no acute distress, comfortable, alert, pleasant  ENT: no scleral icterus  Pulm: breathing comfortably at rest in room air  CV: regular rate and rhythm  Derm: Not pale, no jaundice  Psych: appropriate affect      Pertinent Radiology     Radiology Results:   Recent Results (from the past 24 hour(s))   CTA Chest Abdomen Pelvis w Contrast    Narrative    EXAM: CTA CHEST ABDOMEN PELVIS W CONTRAST  LOCATION: [facility]  DATE: 7/5/2023    INDICATION: Chest pain. Evaluate for dissection.  COMPARISON: Chest x-ray 2 views 11/08/2018 at 1539 hours.  TECHNIQUE: CT angiogram chest abdomen pelvis during arterial phase of injection of IV contrast. 2D and 3D MIP reconstructions were performed by the CT technologist. Dose reduction techniques were used.   CONTRAST: 90 mL Isovue 370 IV.    FINDINGS:   CT ANGIOGRAM CHEST, ABDOMEN, AND PELVIS: Mildly atherosclerotic thoracic arch. Ectatic ascending thoracic segment measuring 3.8 x 3.8 cm (image 39, series 6). No aneurysm or dissection in the chest, abdomen and pelvis. Normal caliber great vessels   arising from the thoracic arch. Mesenteric and bilateral renal arteries are widely patent. Common iliac arteries and downstream branches are widely patent to the lesser trochanters. No pulmonary emboli on either side.    LUNGS AND PLEURA: Mild scarring in the apices. Tiny calcified granuloma  right upper lobe posteriorly. Mild diffuse thickening of the bronchi. Subtle mosaic attenuation bilaterally suggests small airways or small vessel disease. No pleural effusion on   either side.    MEDIASTINUM/AXILLAE: Normal cardiac size. No pericardial effusion. No suspicious adenopathy. Moderate hiatal hernia behind the heart. Trachea is midline.    CORONARY ARTERY CALCIFICATION: None.    HEPATOBILIARY: Multiple gallstones within the gallbladder which is distended. Mild wall thickening and adjacent soft tissue stranding, worrisome for ongoing acute cholecystitis. Recommend targeted ultrasound of the gallbladder for further assessment.   Diffusely fatty infiltrated liver without discrete lesion. Small amount of perihepatic ascites.    PANCREAS: Normal.    SPLEEN: Normal.    ADRENAL GLANDS: Normal.    KIDNEYS/BLADDER: No urinary tract calculi. Both kidneys are well perfused without hydronephrosis or hydroureter. Partially distended normal urinary bladder.    BOWEL: Colonic diverticulosis, more apparent distally at the redundant rectosigmoid, without acute inflammation. Normal appendix. Moderate hiatal hernia. No mechanical bowel obstruction or free gas.    LYMPH NODES: No suspicious abdominopelvic adenopathy.    PELVIC ORGANS: Retroverted fibroid uterus. No adenopathy or free fluid. Rectosigmoid diverticulosis. Normal appendix.    MUSCULOSKELETAL: Mild degenerative changes both shoulders, spine and joints of the pelvis. Mild right convex thoracic curve.      Impression    IMPRESSION:  1.  Mildly atherosclerotic thoracic arch. Ectatic ascending segment. No aortic aneurysm or dissection in the chest, abdomen and pelvis. Downstream branches are widely patent.    2.  No pulmonary emboli on either side. Mild scarring in the apices. Evidence of remote granulomatous disease. Mild diffuse thickening of the bronchi. Subtle mosaic attenuation bilaterally suggests small airways or small vessel disease. No adenopathy or    effusion.     3.  The gallbladder is distended containing multiple stones. Wall thickening and adjacent soft tissue stranding, worrisome for ongoing acute cholecystitis. Recommend targeted ultrasound of the gallbladder for further assessment. Diffusely fatty   infiltrated liver without discrete lesion. Small amount of perihepatic ascites.    4.  Colonic diverticulosis, more apparent distally at the rectosigmoid, without acute inflammation, mechanical obstruction, free gas or free fluid. Moderate hiatal hernia. Normal appendix.    5.  No urinary tract calculi or obstruction. Retroverted fibroid uterus.    6.  Mild degenerative changes both shoulders, spine and joints of the pelvis. Mild right convex thoracic curve.    NOTE: ABNORMAL REPORT    THE DICTATION ABOVE DESCRIBES AN ABNORMALITY FOR WHICH FOLLOW-UP IS NEEDED.      US Abdomen Limited (RUQ)    Narrative    EXAM: US ABDOMEN LIMITED  LOCATION: [facility]  DATE: 7/5/2023    INDICATION: ? cholecystitis.   abd pain  COMPARISON: None.  TECHNIQUE: Limited abdominal ultrasound.    FINDINGS:    GALLBLADDER: Multiple gallstones and sludge are seen within the gallbladder with a positive sonographic Gould's sign reported during the exam. Pericholecystic fluid and gallbladder wall thickening are noted, with the gallbladder wall measuring 6 mm in   diameter.    BILE DUCTS: No biliary dilatation. The common duct measures 4 mm.    LIVER: Normal parenchyma with smooth contour. No focal mass.    RIGHT KIDNEY: No hydronephrosis.    PANCREAS: The visualized portions are normal.    No ascites.      Impression    IMPRESSION:  1.  Cholelithiasis with gallbladder wall thickening up to 6 mm in diameter, pericholecystic fluid and a positive sonographic Gould's sign reported during the exam, consistent with acute cholecystitis.         EKG Results: personally reviewed. sinus without ST changes.    Warren Ibrahim, [facility]  [facility]  [facility]   Phone: #[phone]

## 2023-07-05 NOTE — PROGRESS NOTES
[facility]    Medicine Progress Note - Hospitalist Service    Date of Admission:  7/5/2023    Assessment & Plan   Nia Prieto is a 72 year old female with PMH significant for eHTN, DCIS s/p lumpectomy 6/8/23, prediabetes who presented with complaints of abdominal pain and was found to have acute cholecystitis.  Initial evaluation revealed elevated blood pressure, normal basic metabolic panel and LFTs, elevated white count of 14.3.  EKG was sinus rhythm, no acute ischemia.  Initial treatment included Maalox, viscous lidocaine, Zosyn, Dilaudid, Zofran.      Acute cholecystitis  CTA chest abdomen pelvis with distended gallbladder containing stones, wall thickening.  Right upper quadrant ultrasound consistent with acute cholecystitis. LFTs and lipase wnl  - General surgery consultation; OR with Dr. Stephens today 07/05/23 at 1545  - Medically cleared for surgery  - NPO per surgery guidelines   - Zosyn  - Dilaudid PRN for pain  - Zofran PRN for nausea  - IV NS @ 125 ml/hr x6 hours    eHTN  Not on antihypertensives PTA  - Monitor  - Outpatient follow-up discussed       Breast cancer    DCIS, ER+, HI+, HER2- .  Abnormality identified on routine screening mammogram, biopsy indicating right breast DCIS with microinvasion, requiring surgical resection.  ER positive, anticipating endocrine therapy postoperatively. Underwent lumpectomy 6/8/23, pending radiation, on adjuvant hormone therapy.Has not yet started anastrozole      Seasonal allergies  - Cont Zyrtec tomorrow AM           Diet: NPO for Medical/Clinical Reasons Except for: Ice Chips, Meds    DVT Prophylaxis: Pneumatic Compression Devices, Ambulate every shift and Lovenox postop  Gerard Catheter: Not present  Lines: None     Cardiac Monitoring: None  Code Status: Full Code      Clinically Significant Risk Factors Present on Admission                       # Overweight: Estimated body mass index is 26.57 kg/m  as calculated from the  "following:    Height as of this encounter: 1.626 m (5' 4\").    Weight as of this encounter: 70.2 kg (154 lb 12.8 oz).            Disposition Plan      Expected Discharge Date: 07/06/2023                The patient's care was discussed with the Attending Physician, Dr. Eulalio Akbar.    Goldie Marcano MD  Hospitalist Service  [facility]  Securely message with [employer] (more info)  Text page via [employer] Paging/Directory      Patient seen and examined  Patient discussed with family medicine resident  Agree with assessment and plan as outlined above    Eulalio Akbar MD  [facility] Medicine Service  ______________________________________________________________________    Interval History    NPO  No nausea or vomiting  Feels well  Pain improved w/ dilaudid PRN  Meeting with general surgery - plan for OR later today w/ Dr Stephens for lap kristen      Physical Exam   Vital Signs: Temp: 98.8  F (37.1  C) Temp src: Oral BP: (!) 159/78 Pulse: 84   Resp: 20 SpO2: 96 % O2 Device: None (Room air) Oxygen Delivery: 2 LPM  Weight: 154 lbs 12.8 oz     Gen: no acute distress, comfortable, alert, pleasant.  at bedside  ENT: no scleral icterus  Pulm: breathing comfortably at rest in room air  CV: regular rate and rhythm. No murmur  Derm: Not pale, no jaundice  Psych: appropriate affect    Data   ------------------------- PAST 24 HR DATA REVIEWED -----------------------------------------------    I have personally reviewed the following data over the past 24 hrs:    14.3 (H)  \   13.6   / 254     135 (L) 101 10 /  157 (H)   4.4 24 0.72 \       ALT: <9 AST: 12 AP: 57 TBILI: 0.5   ALB: 4.0 TOT PROTEIN: 7.5 LIPASE: 11       Imaging results reviewed over the past 24 hrs:   Recent Results (from the past 24 hour(s))   CTA Chest Abdomen Pelvis w Contrast    Narrative    EXAM: CTA CHEST ABDOMEN PELVIS W CONTRAST  LOCATION: [facility]  DATE: 7/5/2023    INDICATION: Chest pain. " Evaluate for dissection.  COMPARISON: Chest x-ray 2 views 11/08/2018 at 1539 hours.  TECHNIQUE: CT angiogram chest abdomen pelvis during arterial phase of injection of IV contrast. 2D and 3D MIP reconstructions were performed by the CT technologist. Dose reduction techniques were used.   CONTRAST: 90 mL Isovue 370 IV.    FINDINGS:   CT ANGIOGRAM CHEST, ABDOMEN, AND PELVIS: Mildly atherosclerotic thoracic arch. Ectatic ascending thoracic segment measuring 3.8 x 3.8 cm (image 39, series 6). No aneurysm or dissection in the chest, abdomen and pelvis. Normal caliber great vessels   arising from the thoracic arch. Mesenteric and bilateral renal arteries are widely patent. Common iliac arteries and downstream branches are widely patent to the lesser trochanters. No pulmonary emboli on either side.    LUNGS AND PLEURA: Mild scarring in the apices. Tiny calcified granuloma right upper lobe posteriorly. Mild diffuse thickening of the bronchi. Subtle mosaic attenuation bilaterally suggests small airways or small vessel disease. No pleural effusion on   either side.    MEDIASTINUM/AXILLAE: Normal cardiac size. No pericardial effusion. No suspicious adenopathy. Moderate hiatal hernia behind the heart. Trachea is midline.    CORONARY ARTERY CALCIFICATION: None.    HEPATOBILIARY: Multiple gallstones within the gallbladder which is distended. Mild wall thickening and adjacent soft tissue stranding, worrisome for ongoing acute cholecystitis. Recommend targeted ultrasound of the gallbladder for further assessment.   Diffusely fatty infiltrated liver without discrete lesion. Small amount of perihepatic ascites.    PANCREAS: Normal.    SPLEEN: Normal.    ADRENAL GLANDS: Normal.    KIDNEYS/BLADDER: No urinary tract calculi. Both kidneys are well perfused without hydronephrosis or hydroureter. Partially distended normal urinary bladder.    BOWEL: Colonic diverticulosis, more apparent distally at the redundant rectosigmoid, without acute  inflammation. Normal appendix. Moderate hiatal hernia. No mechanical bowel obstruction or free gas.    LYMPH NODES: No suspicious abdominopelvic adenopathy.    PELVIC ORGANS: Retroverted fibroid uterus. No adenopathy or free fluid. Rectosigmoid diverticulosis. Normal appendix.    MUSCULOSKELETAL: Mild degenerative changes both shoulders, spine and joints of the pelvis. Mild right convex thoracic curve.      Impression    IMPRESSION:  1.  Mildly atherosclerotic thoracic arch. Ectatic ascending segment. No aortic aneurysm or dissection in the chest, abdomen and pelvis. Downstream branches are widely patent.    2.  No pulmonary emboli on either side. Mild scarring in the apices. Evidence of remote granulomatous disease. Mild diffuse thickening of the bronchi. Subtle mosaic attenuation bilaterally suggests small airways or small vessel disease. No adenopathy or   effusion.     3.  The gallbladder is distended containing multiple stones. Wall thickening and adjacent soft tissue stranding, worrisome for ongoing acute cholecystitis. Recommend targeted ultrasound of the gallbladder for further assessment. Diffusely fatty   infiltrated liver without discrete lesion. Small amount of perihepatic ascites.    4.  Colonic diverticulosis, more apparent distally at the rectosigmoid, without acute inflammation, mechanical obstruction, free gas or free fluid. Moderate hiatal hernia. Normal appendix.    5.  No urinary tract calculi or obstruction. Retroverted fibroid uterus.    6.  Mild degenerative changes both shoulders, spine and joints of the pelvis. Mild right convex thoracic curve.    NOTE: ABNORMAL REPORT    THE DICTATION ABOVE DESCRIBES AN ABNORMALITY FOR WHICH FOLLOW-UP IS NEEDED.      US Abdomen Limited (RUQ)    Narrative    EXAM: US ABDOMEN LIMITED  LOCATION: [facility]  DATE: 7/5/2023    INDICATION: ? cholecystitis.   abd pain  COMPARISON: None.  TECHNIQUE: Limited abdominal  ultrasound.    FINDINGS:    GALLBLADDER: Multiple gallstones and sludge are seen within the gallbladder with a positive sonographic Gould's sign reported during the exam. Pericholecystic fluid and gallbladder wall thickening are noted, with the gallbladder wall measuring 6 mm in   diameter.    BILE DUCTS: No biliary dilatation. The common duct measures 4 mm.    LIVER: Normal parenchyma with smooth contour. No focal mass.    RIGHT KIDNEY: No hydronephrosis.    PANCREAS: The visualized portions are normal.    No ascites.      Impression    IMPRESSION:  1.  Cholelithiasis with gallbladder wall thickening up to 6 mm in diameter, pericholecystic fluid and a positive sonographic Gould's sign reported during the exam, consistent with acute cholecystitis.

## 2023-07-05 NOTE — ANESTHESIA PREPROCEDURE EVALUATION
Anesthesia Pre-Procedure Evaluation    Patient: Nia Prieto   MRN: [id number] : 1950        Procedure : Procedure(s):  BREAST TAG LOCALIZED LUMPECTOMY WITH SENTINEL LYMPH NODE BIOPSY          Past Medical History:   Diagnosis Date     Diabetes (H)      Ductal carcinoma in situ (DCIS) of breast with microinvasive component (H)     right     Eczema      Hypertension      Seasonal allergic rhinitis       Past Surgical History:   Procedure Laterality Date     CATARACT EXTRACTION Bilateral      COLONOSCOPY       EYE SURGERY       LUMPECTOMY BREAST Right 2023    Procedure: BREAST TAG LOCALIZED LUMPECTOMY WITH SENTINEL LYMPH NODE BIOPSY;  Surgeon: Lillian Marie DO;  Location: [facility] OR      No Known Allergies   Social History     Tobacco Use     Smoking status: Never     Smokeless tobacco: Never   Substance Use Topics     Alcohol use: Not Currently      Wt Readings from Last 1 Encounters:   23 70.2 kg (154 lb 12.8 oz)        Anesthesia Evaluation            ROS/MED HX  ENT/Pulmonary:  - neg pulmonary ROS     Neurologic:  - neg neurologic ROS     Cardiovascular:     (+) hypertension-----    METS/Exercise Tolerance: >4 METS    Hematologic:  - neg hematologic  ROS     Musculoskeletal:  - neg musculoskeletal ROS     GI/Hepatic:  - neg GI/hepatic ROS     Renal/Genitourinary:  - neg Renal ROS     Endo:     (+) type II DM,     Psychiatric/Substance Use:  - neg psychiatric ROS     Infectious Disease:  - neg infectious disease ROS     Malignancy: Comment: Ductal carcinoma in situ (DCIS) of right breast with microinvasive component   (+) Malignancy, History of Breast.    Other:  - neg other ROS          Physical Exam    Airway  airway exam normal      Mallampati: II       Respiratory Devices and Support         Dental           Cardiovascular   cardiovascular exam normal       Rhythm and rate: regular and normal     Pulmonary   pulmonary exam normal        breath sounds clear to auscultation            OUTSIDE LABS:  CBC:   Lab Results   Component Value Date    WBC 14.3 (H) 07/05/2023    WBC 6.3 03/09/2023    HGB 13.6 07/05/2023    HGB 14.0 06/05/2023    HCT 42.2 07/05/2023    HCT 41.3 03/09/2023     07/05/2023     03/09/2023     BMP:   Lab Results   Component Value Date     (L) 07/05/2023     03/09/2023    POTASSIUM 4.4 07/05/2023    POTASSIUM 5.0 03/09/2023    CHLORIDE 101 07/05/2023    CHLORIDE 103 03/09/2023    CO2 24 07/05/2023    CO2 24 03/09/2023    BUN 10 07/05/2023    BUN 12.3 03/09/2023    CR 0.72 07/05/2023    CR 0.81 03/09/2023     (H) 07/05/2023     (H) 03/09/2023     COAGS: No results found for: PTT, INR, FIBR  POC: No results found for: BGM, HCG, HCGS  HEPATIC:   Lab Results   Component Value Date    ALBUMIN 4.0 07/05/2023    PROTTOTAL 7.5 07/05/2023    ALT <9 07/05/2023    AST 12 07/05/2023    ALKPHOS 57 07/05/2023    BILITOTAL 0.5 07/05/2023     OTHER:   Lab Results   Component Value Date    A1C 5.5 03/09/2023    KRISTY 9.9 07/05/2023    LIPASE 11 07/05/2023       Anesthesia Plan    ASA Status:  3   NPO Status:  NPO Appropriate    Anesthesia Type: General.     - Airway: ETT   Induction: Intravenous, Propofol.   Maintenance: Balanced.        Consents    Anesthesia Plan(s) and associated risks, benefits, and realistic alternatives discussed. Questions answered and patient/representative(s) expressed understanding.    - Discussed:     - Discussed with:  Patient      - Extended Intubation/Ventilatory Support Discussed: No.      - Patient is DNR/DNI Status: No    Use of blood products discussed: No .     Postoperative Care    Pain management: Oral pain medications.   PONV prophylaxis: Ondansetron (or other 5HT-3), Dexamethasone or Solumedrol, Background Propofol Infusion     Comments:    Other Comments: Planned anesthetic: MAC  FiO2 less than 30%  Propofol ggt  Decadron/zofran                Kimo Pulido MD

## 2023-07-06 ENCOUNTER — TELEPHONE (OUTPATIENT)
Dept: RADIATION ONCOLOGY | Facility: CLINIC | Age: 73
End: 2023-07-06
Payer: MEDICARE

## 2023-07-06 VITALS
HEIGHT: 64 IN | OXYGEN SATURATION: 93 % | HEART RATE: 77 BPM | DIASTOLIC BLOOD PRESSURE: 72 MMHG | TEMPERATURE: 97.9 F | WEIGHT: 160.9 LBS | RESPIRATION RATE: 16 BRPM | SYSTOLIC BLOOD PRESSURE: 155 MMHG | BODY MASS INDEX: 27.47 KG/M2

## 2023-07-06 LAB
ALBUMIN SERPL-MCNC: 3 G/DL (ref 3.5–5)
ALP SERPL-CCNC: 51 U/L (ref 45–120)
ALT SERPL W P-5'-P-CCNC: 18 U/L (ref 0–45)
ANION GAP SERPL CALCULATED.3IONS-SCNC: 10 MMOL/L (ref 5–18)
AST SERPL W P-5'-P-CCNC: 35 U/L (ref 0–40)
BILIRUB SERPL-MCNC: 0.4 MG/DL (ref 0–1)
BUN SERPL-MCNC: 11 MG/DL (ref 8–28)
CALCIUM SERPL-MCNC: 9.6 MG/DL (ref 8.5–10.5)
CHLORIDE BLD-SCNC: 103 MMOL/L (ref 98–107)
CO2 SERPL-SCNC: 25 MMOL/L (ref 22–31)
CREAT SERPL-MCNC: 0.67 MG/DL (ref 0.6–1.1)
ERYTHROCYTE [DISTWIDTH] IN BLOOD BY AUTOMATED COUNT: 13.4 % (ref 10–15)
GFR SERPL CREATININE-BSD FRML MDRD: >90 ML/MIN/1.73M2
GLUCOSE BLD-MCNC: 120 MG/DL (ref 70–125)
HCT VFR BLD AUTO: 38.5 % (ref 35–47)
HGB BLD-MCNC: 12.5 G/DL (ref 11.7–15.7)
MCH RBC QN AUTO: 29.1 PG (ref 26.5–33)
MCHC RBC AUTO-ENTMCNC: 32.5 G/DL (ref 31.5–36.5)
MCV RBC AUTO: 90 FL (ref 78–100)
PLATELET # BLD AUTO: 219 10E3/UL (ref 150–450)
POTASSIUM BLD-SCNC: 4.6 MMOL/L (ref 3.5–5)
PROT SERPL-MCNC: 6.4 G/DL (ref 6–8)
RBC # BLD AUTO: 4.3 10E6/UL (ref 3.8–5.2)
SODIUM SERPL-SCNC: 138 MMOL/L (ref 136–145)
WBC # BLD AUTO: 12.1 10E3/UL (ref 4–11)

## 2023-07-06 PROCEDURE — 99238 HOSP IP/OBS DSCHRG MGMT 30/<: CPT | Mod: GC | Performed by: FAMILY MEDICINE

## 2023-07-06 PROCEDURE — 250N000013 HC RX MED GY IP 250 OP 250 PS 637: Performed by: FAMILY MEDICINE

## 2023-07-06 PROCEDURE — 36415 COLL VENOUS BLD VENIPUNCTURE: CPT | Performed by: SPECIALIST

## 2023-07-06 PROCEDURE — 80053 COMPREHEN METABOLIC PANEL: CPT | Performed by: SPECIALIST

## 2023-07-06 PROCEDURE — 99024 POSTOP FOLLOW-UP VISIT: CPT | Performed by: PHYSICIAN ASSISTANT

## 2023-07-06 PROCEDURE — 85014 HEMATOCRIT: CPT | Performed by: SPECIALIST

## 2023-07-06 PROCEDURE — 250N000013 HC RX MED GY IP 250 OP 250 PS 637: Performed by: SPECIALIST

## 2023-07-06 PROCEDURE — 250N000011 HC RX IP 250 OP 636: Mod: JZ | Performed by: SPECIALIST

## 2023-07-06 RX ORDER — ACETAMINOPHEN 325 MG/1
650 TABLET ORAL EVERY 4 HOURS PRN
COMMUNITY
Start: 2023-07-06 | End: 2024-03-18

## 2023-07-06 RX ORDER — HYDROMORPHONE HYDROCHLORIDE 2 MG/1
2 TABLET ORAL
Status: DISCONTINUED | OUTPATIENT
Start: 2023-07-06 | End: 2023-07-06 | Stop reason: HOSPADM

## 2023-07-06 RX ORDER — POLYETHYLENE GLYCOL 3350 17 G/17G
17 POWDER, FOR SOLUTION ORAL DAILY PRN
Qty: 510 G | COMMUNITY
Start: 2023-07-06 | End: 2023-07-26

## 2023-07-06 RX ORDER — HYDROMORPHONE HYDROCHLORIDE 2 MG/1
2 TABLET ORAL
Qty: 10 TABLET | Refills: 0 | Status: SHIPPED | OUTPATIENT
Start: 2023-07-06 | End: 2023-07-12

## 2023-07-06 RX ADMIN — HYDROMORPHONE HYDROCHLORIDE 2 MG: 2 TABLET ORAL at 08:54

## 2023-07-06 RX ADMIN — SENNOSIDES AND DOCUSATE SODIUM 1 TABLET: 50; 8.6 TABLET ORAL at 08:54

## 2023-07-06 RX ADMIN — PIPERACILLIN AND TAZOBACTAM 3.38 G: 3; .375 INJECTION, POWDER, LYOPHILIZED, FOR SOLUTION INTRAVENOUS at 04:05

## 2023-07-06 RX ADMIN — ACETAMINOPHEN 975 MG: 325 TABLET ORAL at 06:25

## 2023-07-06 RX ADMIN — CETIRIZINE HYDROCHLORIDE 10 MG: 10 TABLET, FILM COATED ORAL at 08:54

## 2023-07-06 RX ADMIN — POLYETHYLENE GLYCOL 3350 17 G: 17 POWDER, FOR SOLUTION ORAL at 08:53

## 2023-07-06 RX ADMIN — FAMOTIDINE 20 MG: 20 TABLET ORAL at 08:54

## 2023-07-06 ASSESSMENT — ACTIVITIES OF DAILY LIVING (ADL)
ADLS_ACUITY_SCORE: 35

## 2023-07-06 NOTE — DISCHARGE SUMMARY
[facility]  Hospitalist Discharge Summary      Date of Admission:  7/5/2023  Date of Discharge:  7/6/2023  Discharging Provider: Eulalio Akbar MD ([id number])// Dr. Eulalio Akbar MD (attending)  Discharge Service: Hospitalist Service    Discharge Diagnoses   Principal Problem:    Acute cholecystitis        Follow-ups Needed After Discharge   Follow-up Appointments     Follow-up and recommended labs and tests       Follow up with primary care provider, Sammie Pérez, within 7 days for   hospital follow- up.  No follow up labs or test are needed.           Follow up on home blood pressure readings      Unresulted Labs Ordered in the Past 30 Days of this Admission     Date and Time Order Name Status Description    7/5/2023  4:38 PM Surgical Pathology Exam In process       These results will be followed up by general surgery    Discharge Disposition   Discharged to home  Condition at discharge: Stable    Hospital Course     Nia Prieto is a 72 year old female with PMH significant for elevated BP, DCIS s/p lumpectomy 6/8/23, prediabetes who presented with complaints of abdominal pain and was found to have acute cholecystitis.  Initial evaluation revealed elevated blood pressure, normal basic metabolic panel lipase and LFTs, and elevated white count of 14.3.  EKG was sinus rhythm, no acute ischemia.  Initial treatment included Maalox, viscous lidocaine, Zosyn, Dilaudid, Zofran.  She underwent an uncomplicated laparoscopic cholecystectomy with Dr. Stephens on 7/5.  Postoperative course was remarkable only for constipation and incisional abdominal pain.  Bowel and pain regiments discussed.     Acute cholecystitis: Follow up with general surgery    Elevated BP:  Home BP monitoring     Breast cancer: DCIS, ER+, MN+, HER2- s/p lumpectomy 6/8/23, pending radiation, on adjuvant hormone therapy. Has not yet started anastrozole.  No acute concerns during this hospitalization.        Consultations This Hospital Stay   SURGERY GENERAL IP CONSULT  CARE MANAGEMENT / SOCIAL WORK IP CONSULT    Code Status   Prior    Time Spent on this Encounter   I, Goldie Marcano MD, personally saw the patient today and spent less than or equal to 30 minutes discharging this patient.     Staffed with Dr. Eulalio Akbar.  Goldie Marcano MD  [facility] HEART [facility]ARE  [address] [address]  Phone: [phone]  Fax: [phone]     Patient seen and examined with resident  Agree with her assessment and plan as outlined above    Eulalio Akbar MD  [facility] Medicine Service  ______________________________________________________________________    Physical Exam   Vital Signs: Temp: 97.9  F (36.6  C) Temp src: Oral BP: (!) 155/72 Pulse: 77   Resp: 16 SpO2: 93 % Weight: 160 lbs 14.4 oz  GEN: No acute distress, comfortable  Cardiovascular: Regular rate and rhythm without murmur  Lungs: Clear throughout  ABD: Soft tenderness along the incisions.  All wounds are clean and dry.  EXT:No cyanosis, edema or obvious abnormalities       Primary Care Physician   Sammie Pérez    Discharge Orders      Primary Care - Care Coordination Referral      Reason for your hospital stay    Nia Prieto is a 72 year old female with PMH significant for eHTN, DCIS s/p lumpectomy 6/8/23, prediabetes who presented with complaints of abdominal pain and was found to have acute cholecystitis. Had uncomplicated cholecystectomy..     Follow-up and recommended labs and tests     Follow up with primary care provider, Sammie Pérez, within 7 days for hospital follow- up.  No follow up labs or test are needed.     Activity    Your activity upon discharge: activity as tolerated     Diet    Follow this diet upon discharge: Orders Placed This Encounter      Regular Diet Adult       Significant Results and Procedures   Results for orders placed or performed during the hospital encounter  of 07/05/23   CTA Chest Abdomen Pelvis w Contrast    Narrative    EXAM: CTA CHEST ABDOMEN PELVIS W CONTRAST  LOCATION: [facility]  DATE: 7/5/2023    INDICATION: Chest pain. Evaluate for dissection.  COMPARISON: Chest x-ray 2 views 11/08/2018 at 1539 hours.  TECHNIQUE: CT angiogram chest abdomen pelvis during arterial phase of injection of IV contrast. 2D and 3D MIP reconstructions were performed by the CT technologist. Dose reduction techniques were used.   CONTRAST: 90 mL Isovue 370 IV.    FINDINGS:   CT ANGIOGRAM CHEST, ABDOMEN, AND PELVIS: Mildly atherosclerotic thoracic arch. Ectatic ascending thoracic segment measuring 3.8 x 3.8 cm (image 39, series 6). No aneurysm or dissection in the chest, abdomen and pelvis. Normal caliber great vessels   arising from the thoracic arch. Mesenteric and bilateral renal arteries are widely patent. Common iliac arteries and downstream branches are widely patent to the lesser trochanters. No pulmonary emboli on either side.    LUNGS AND PLEURA: Mild scarring in the apices. Tiny calcified granuloma right upper lobe posteriorly. Mild diffuse thickening of the bronchi. Subtle mosaic attenuation bilaterally suggests small airways or small vessel disease. No pleural effusion on   either side.    MEDIASTINUM/AXILLAE: Normal cardiac size. No pericardial effusion. No suspicious adenopathy. Moderate hiatal hernia behind the heart. Trachea is midline.    CORONARY ARTERY CALCIFICATION: None.    HEPATOBILIARY: Multiple gallstones within the gallbladder which is distended. Mild wall thickening and adjacent soft tissue stranding, worrisome for ongoing acute cholecystitis. Recommend targeted ultrasound of the gallbladder for further assessment.   Diffusely fatty infiltrated liver without discrete lesion. Small amount of perihepatic ascites.    PANCREAS: Normal.    SPLEEN: Normal.    ADRENAL GLANDS: Normal.    KIDNEYS/BLADDER: No urinary tract calculi. Both kidneys are  well perfused without hydronephrosis or hydroureter. Partially distended normal urinary bladder.    BOWEL: Colonic diverticulosis, more apparent distally at the redundant rectosigmoid, without acute inflammation. Normal appendix. Moderate hiatal hernia. No mechanical bowel obstruction or free gas.    LYMPH NODES: No suspicious abdominopelvic adenopathy.    PELVIC ORGANS: Retroverted fibroid uterus. No adenopathy or free fluid. Rectosigmoid diverticulosis. Normal appendix.    MUSCULOSKELETAL: Mild degenerative changes both shoulders, spine and joints of the pelvis. Mild right convex thoracic curve.      Impression    IMPRESSION:  1.  Mildly atherosclerotic thoracic arch. Ectatic ascending segment. No aortic aneurysm or dissection in the chest, abdomen and pelvis. Downstream branches are widely patent.    2.  No pulmonary emboli on either side. Mild scarring in the apices. Evidence of remote granulomatous disease. Mild diffuse thickening of the bronchi. Subtle mosaic attenuation bilaterally suggests small airways or small vessel disease. No adenopathy or   effusion.     3.  The gallbladder is distended containing multiple stones. Wall thickening and adjacent soft tissue stranding, worrisome for ongoing acute cholecystitis. Recommend targeted ultrasound of the gallbladder for further assessment. Diffusely fatty   infiltrated liver without discrete lesion. Small amount of perihepatic ascites.    4.  Colonic diverticulosis, more apparent distally at the rectosigmoid, without acute inflammation, mechanical obstruction, free gas or free fluid. Moderate hiatal hernia. Normal appendix.    5.  No urinary tract calculi or obstruction. Retroverted fibroid uterus.    6.  Mild degenerative changes both shoulders, spine and joints of the pelvis. Mild right convex thoracic curve.    NOTE: ABNORMAL REPORT    THE DICTATION ABOVE DESCRIBES AN ABNORMALITY FOR WHICH FOLLOW-UP IS NEEDED.      US Abdomen Limited (RUQ)    Narrative     EXAM: US ABDOMEN LIMITED  LOCATION: [facility]  DATE: 7/5/2023    INDICATION: ? cholecystitis.   abd pain  COMPARISON: None.  TECHNIQUE: Limited abdominal ultrasound.    FINDINGS:    GALLBLADDER: Multiple gallstones and sludge are seen within the gallbladder with a positive sonographic Gould's sign reported during the exam. Pericholecystic fluid and gallbladder wall thickening are noted, with the gallbladder wall measuring 6 mm in   diameter.    BILE DUCTS: No biliary dilatation. The common duct measures 4 mm.    LIVER: Normal parenchyma with smooth contour. No focal mass.    RIGHT KIDNEY: No hydronephrosis.    PANCREAS: The visualized portions are normal.    No ascites.      Impression    IMPRESSION:  1.  Cholelithiasis with gallbladder wall thickening up to 6 mm in diameter, pericholecystic fluid and a positive sonographic Gould's sign reported during the exam, consistent with acute cholecystitis.             Discharge Medications   Discharge Medication List as of 7/6/2023 10:21 AM      START taking these medications    Details   acetaminophen (TYLENOL) 325 MG tablet Take 2 tablets (650 mg) by mouth every 4 hours as needed for pain, OTC      HYDROmorphone (DILAUDID) 2 MG tablet Take 1 tablet (2 mg) by mouth every 3 hours as needed for moderate pain, Disp-10 tablet, R-0, Local Print      polyethylene glycol (MIRALAX) 17 GM/Dose powder Take 17 g by mouth daily as needed for constipation, Disp-510 g, OTC         CONTINUE these medications which have NOT CHANGED    Details   anastrozole (ARIMIDEX) 1 MG tablet Take 1 tablet (1 mg) by mouth daily, Disp-30 tablet, R-1, E-Prescribe      cetirizine (ZYRTEC) 10 MG tablet Take 10 mg by mouth daily, Historical      hydrocortisone (WESTCORT) 0.2 % external cream Apply topically 2 times daily as needed (eczema)Disp-30 g, [id number]      ibuprofen (ADVIL/MOTRIN) 200 MG tablet Take 400 mg by mouth every 6 hours as needed for pain, Historical       L. rhamnosus GG/inulin (CULTURELLE PROBIOTICS ORAL) Take 1 capsule by mouth daily as needed, Historical           Allergies   No Known Allergies

## 2023-07-06 NOTE — PROGRESS NOTES
Pain controlled via oral pain meds 7/10 prior to medications this am to 2/10.     Patient educated about abdominal bracing when moving. Independent in room.     Incentive spirometer sent home with patient, education given.      driving patient home for patient safety.

## 2023-07-06 NOTE — TELEPHONE ENCOUNTER
Pt left a VM that she had her gallbladder out yesterday and came home from the hospital today. She was wondering if RT breast simulation needed to be delayed or rescheduled from 7/10. I called her back and had to leave a voicemail, stated there's no need to delay the simulation appointment, but if she wasn't feeling up to it could reschedule for a week or two out. RO RN number and RO scheduling phone numbers provided.     Arleen Tomas RN  [facility]

## 2023-07-06 NOTE — ANESTHESIA POSTPROCEDURE EVALUATION
Patient: Nia Prieto    Procedure: Procedure(s):  CHOLECYSTECTOMY, LAPAROSCOPIC       Anesthesia Type:  General    Note:     Postop Pain Control: Uneventful            Sign Out: Well controlled pain   PONV: No   Neuro/Psych: Uneventful            Sign Out: Acceptable/Baseline neuro status   Airway/Respiratory: Uneventful            Sign Out: Acceptable/Baseline resp. status   CV/Hemodynamics: Uneventful            Sign Out: Acceptable CV status; No obvious hypovolemia; No obvious fluid overload   Other NRE: NONE   DID A NON-ROUTINE EVENT OCCUR? No           Last vitals:  Vitals Value Taken Time   /85 07/05/23 1800   Temp 36.7  C (98.1  F) 07/05/23 1800   Pulse 72 07/05/23 1805   Resp 13 07/05/23 1805   SpO2 98 % 07/05/23 1805   Vitals shown include unvalidated device data.    Electronically Signed By: Art Monroy MD

## 2023-07-06 NOTE — PROGRESS NOTES
Care Management Discharge Note    Discharge Date: 07/06/2023     Discharge Disposition: Home    Discharge Transportation:  Family transport    Patient/Family in Agreement with the Plan: yes    Additional Information:  Reviewed, No Needs from CM for discharge.     BPA Triggered at discharge, referral sent as requested.    NADIRA Leyva

## 2023-07-06 NOTE — PLAN OF CARE
Problem: Pain Acute  Goal: Optimal Pain Control and Function  Outcome: Progressing     Problem: Infection  Goal: Absence of Infection Signs and Symptoms  Outcome: Progressing     Pt A&Ox4, able to make needs known. Pt reporting some pain with movement, declines need for PRN pain medication this shify. Pt up SBA to toilet. IV abx given per MAR. Lap sites look C/D/I, no signs to infection noted. Pt on RA, tele NSR, no SOB or CP noted. Pt sleeping between cares. No acute changes noted this shift.    Imani Weiss, RN  [phone]

## 2023-07-06 NOTE — PROGRESS NOTES
ASSESSMENT:  1. Acute cholecystitis        Nia Prieto is a 72 year old female who is s/p laparoscopic cholecystectomy with Dr. Stephens POD #1.  Overall doing very well.    PLAN:  -Okay to discharge today from our standpoint.  Placed her discharge recommendations in.    SUBJECTIVE:   She is sore particularly around the right upper quadrant.  Able to ambulate, urinate on own and passing gas.  No nausea.      Patient Vitals for the past 24 hrs:   BP Temp Temp src Pulse Resp SpO2 Weight   07/06/23 0800 (!) 155/72 97.9  F (36.6  C) Oral 77 -- 93 % --   07/06/23 0616 -- -- -- -- -- -- 73 kg (160 lb 14.4 oz)   07/06/23 0540 (!) 149/68 98.5  F (36.9  C) Oral 57 16 95 % --   07/06/23 0400 -- -- -- 56 -- 91 % --   07/06/23 0130 126/66 98.2  F (36.8  C) Oral 61 16 94 % --   07/05/23 2130 128/67 97.7  F (36.5  C) Oral 62 18 96 % --   07/05/23 2030 129/69 97.5  F (36.4  C) Oral 67 18 97 % --   07/05/23 1930 124/61 97.5  F (36.4  C) Oral 62 20 97 % --   07/05/23 1900 (!) 148/64 97.6  F (36.4  C) Axillary 70 18 97 % --   07/05/23 1842 -- -- -- 70 -- 97 % --   07/05/23 1832 138/74 -- -- 72 -- 91 % --   07/05/23 1830 (!) 151/72 97.7  F (36.5  C) Oral 71 18 95 % --   07/05/23 1800 (!) 146/85 98.1  F (36.7  C) Temporal 69 19 98 % --   07/05/23 1750 137/77 -- -- 77 18 93 % --   07/05/23 1740 117/70 -- -- 67 20 99 % --   07/05/23 1730 104/55 -- -- 69 16 98 % --   07/05/23 1720 138/70 -- -- 72 18 98 % --   07/05/23 1718 (!) 143/73 97.1  F (36.2  C) Temporal 75 18 98 % --   07/05/23 1500 (!) 150/74 98.6  F (37  C) -- 91 -- 93 % --   07/05/23 1346 (!) 140/76 98.2  F (36.8  C) Oral 86 -- 92 % --         PHYSICAL EXAM:  GEN: No acute distress, comfortable    ABD: Soft tenderness along the incisions.  All wounds are clean and dry.     Output by Drain (mL) 07/04/23 0700 - 07/04/23 1459 07/04/23 1500 - 07/04/23 2259 07/04/23 2300 - 07/05/23 0659 07/05/23 0700 - 07/05/23 1459 07/05/23 1500 - 07/05/23 2259 07/05/23 2300 - 07/06/23 0659  07/06/23 0700 - 07/06/23 1103   Patient has no LDAs of requested type attached.      EXT:No cyanosis, edema or obvious abnormalities    07/05 0700 - 07/06 0659  In: 1631.25 [I.V.:1631.25]  Out: 20     Admission on 07/05/2023   Component Date Value     Sodium 07/05/2023 135 (L)      Potassium 07/05/2023 4.4      Chloride 07/05/2023 101      Carbon Dioxide (CO2) 07/05/2023 24      Anion Gap 07/05/2023 10      Urea Nitrogen 07/05/2023 10      Creatinine 07/05/2023 0.72      Calcium 07/05/2023 9.9      Glucose 07/05/2023 157 (H)      Alkaline Phosphatase 07/05/2023 57      AST 07/05/2023 12      ALT 07/05/2023 <9      Protein Total 07/05/2023 7.5      Albumin 07/05/2023 4.0      Bilirubin Total 07/05/2023 0.5      GFR Estimate 07/05/2023 89      Lipase 07/05/2023 11      Systolic Blood Pressure 07/05/2023 117      Diastolic Blood Pressure 07/05/2023 67      Ventricular Rate 07/05/2023 80      Atrial Rate 07/05/2023 80      IA Interval 07/05/2023 120      QRS Duration 07/05/2023 90      QT 07/05/2023 384      QTc 07/05/2023 442      P Axis 07/05/2023 60      R AXIS 07/05/2023 55      T Axis 07/05/2023 57      Interpretation ECG 07/05/2023                      Value:Sinus rhythm  Normal ECG  No previous ECGs available  Confirmed by SEE ED PROVIDER NOTE FOR, ECG INTERPRETATION ([id number]),  PETEY BROWN ([id number]) on 7/5/2023 7:57:36 AM       Troponin I 07/05/2023 <0.01      WBC Count 07/05/2023 14.3 (H)      RBC Count 07/05/2023 4.68      Hemoglobin 07/05/2023 13.6      Hematocrit 07/05/2023 42.2      MCV 07/05/2023 90      MCH 07/05/2023 29.1      MCHC 07/05/2023 32.2      RDW 07/05/2023 13.2      Platelet Count 07/05/2023 254      % Neutrophils 07/05/2023 87      % Lymphocytes 07/05/2023 4      % Monocytes 07/05/2023 9      % Eosinophils 07/05/2023 0      % Basophils 07/05/2023 0      % Immature Granulocytes 07/05/2023 0      NRBCs per 100 WBC 07/05/2023 0      Absolute Neutrophils 07/05/2023 12.3 (H)      Absolute  Lymphocytes 07/05/2023 0.6 (L)      Absolute Monocytes 07/05/2023 1.3      Absolute Eosinophils 07/05/2023 0.0      Absolute Basophils 07/05/2023 0.0      Absolute Immature Granul* 07/05/2023 0.1      Absolute NRBCs 07/05/2023 0.0      Sodium 07/06/2023 138      Potassium 07/06/2023 4.6      Chloride 07/06/2023 103      Carbon Dioxide (CO2) 07/06/2023 25      Anion Gap 07/06/2023 10      Urea Nitrogen 07/06/2023 11      Creatinine 07/06/2023 0.67      Calcium 07/06/2023 9.6      Glucose 07/06/2023 120      Alkaline Phosphatase 07/06/2023 51      AST 07/06/2023 35      ALT 07/06/2023 18      Protein Total 07/06/2023 6.4      Albumin 07/06/2023 3.0 (L)      Bilirubin Total 07/06/2023 0.4      GFR Estimate 07/06/2023 >90      WBC Count 07/06/2023 12.1 (H)      RBC Count 07/06/2023 4.30      Hemoglobin 07/06/2023 12.5      Hematocrit 07/06/2023 38.5      MCV 07/06/2023 90      MCH 07/06/2023 29.1      MCHC 07/06/2023 32.5      RDW 07/06/2023 13.4      Platelet Count 07/06/2023 219           SINDI Pruett  [facility]  [address] [place] [id number]  Phone- [phone]  Fax- [phone]

## 2023-07-07 ENCOUNTER — PATIENT OUTREACH (OUTPATIENT)
Dept: CARE COORDINATION | Facility: CLINIC | Age: 73
End: 2023-07-07
Payer: MEDICARE

## 2023-07-07 NOTE — LETTER
[facility] CARE COORDINATION  [facility]  July 7, 2023    Nia Prieto  [address] [facility] [address]      Dear Nia,        I am a  clinic care coordinator who works with Sammie Pérez MD with the [facility]. I wanted to thank you for spending the time to talk with me.  Below is a description of clinic care coordination and how I can further assist you.       The clinic care coordination team is made up of a registered nurse, , financial resource worker and community health worker who understand the health care system. The goal of clinic care coordination is to help you manage your health and improve access to the health care system. Our team works alongside your provider to assist you in determining your health and social needs. We can help you obtain health care and community resources, providing you with necessary information and education. We can work with you through any barriers and develop a care plan that helps coordinate and strengthen the communication between you and your care team.  Our services are voluntary and are offered without charge to you personally.    Please feel free to contact me with any questions or concerns regarding care coordination and what we can offer.      We are focused on providing you with the highest-quality healthcare experience possible.    Sincerely,     Pat Lyons,   [facility]  [phone]

## 2023-07-07 NOTE — PROGRESS NOTES
Clinic Care Coordination Contact  [facility]: Post-Discharge Note  SITUATION                                                      Admission:    Admission Date: 07/05/23   Reason for Admission: stomach pain  Discharge:   Discharge Date: 07/06/23  Discharge Diagnosis: acute cholecystitis    BACKGROUND                                                      Per hospital discharge summary and inpatient provider notes:  Nia Prieto is a 72 year old female with PMH significant for eHTN, DCIS s/p lumpectomy 6/8/23, prediabetes who presented with complaints of abdominal pain and was found to have acute cholecystitis. Had uncomplicated cholecystectomy..          Follow-up and recommended labs and tests      Follow up with primary care provider, Sammie Pérez, within 7 days for hospital follow- up.  No follow up labs or test are needed.         ASSESSMENT           Discharge Assessment  How are you doing now that you are home?: better  How are your symptoms? (Red Flag symptoms escalate to triage hotline per guidelines): Improved  Do you feel your condition is stable enough to be safe at home until your provider visit?: Yes  Does the patient have their discharge instructions? : Yes  Does the patient have questions regarding their discharge instructions? : No  Were you started on any new medications or were there changes to any of your previous medications? : Yes  Does the patient have all of their medications?: Yes  Do you have questions regarding any of your medications? : No  Do you have all of your needed medical supplies or equipment (DME)?  (i.e. oxygen tank, CPAP, cane, etc.): Yes  Discharge follow-up appointment scheduled within 14 calendar days? : Yes  Discharge Follow Up Appointment Date: 07/12/23  Discharge Follow Up Appointment Scheduled with?: Primary Care Provider         Post-op (Clinicians Only)  Did the patient have surgery or a procedure: Yes  Incision: closed  Drainage: No  Fever: No  Chills:  No  Redness: No  Warmth: No  Swelling: No  Incision site pain: No  Eating & Drinking: eating and drinking without complaints/concerns  PO Intake: low fat diet  Bowel Function: normal  Urinary Status: voiding without complaint/concerns  Doing better each hour with pain. Moving around and no issues with eating or drinking.  Waiting for BM.  No concerns.   available to assist. Reviewed upcoming appts.      PLAN                                                      Outpatient Plan:  Patient to attend appts as scheduled.     Future Appointments   Date Time Provider Department Center   7/10/2023 10:30 AM [facility]   7/12/2023  9:10 AM Sammie Pérez MD [facility]   3/18/2024  8:50 AM [facility]         For any urgent concerns, please contact our 24 hour nurse triage line: [phone] ([phone])         NADIRA Villela

## 2023-07-10 ENCOUNTER — ALLIED HEALTH/NURSE VISIT (OUTPATIENT)
Dept: RADIATION ONCOLOGY | Facility: HOSPITAL | Age: 73
End: 2023-07-10
Attending: INTERNAL MEDICINE
Payer: MEDICARE

## 2023-07-10 PROCEDURE — 77290 THER RAD SIMULAJ FIELD CPLX: CPT | Performed by: RADIOLOGY

## 2023-07-10 PROCEDURE — 77290 THER RAD SIMULAJ FIELD CPLX: CPT | Mod: 26 | Performed by: RADIOLOGY

## 2023-07-10 PROCEDURE — 77334 RADIATION TREATMENT AID(S): CPT | Mod: 26 | Performed by: RADIOLOGY

## 2023-07-10 PROCEDURE — 77334 RADIATION TREATMENT AID(S): CPT | Performed by: RADIOLOGY

## 2023-07-10 NOTE — PROCEDURES
SIMULATION NOTE:       DIAGNOSIS: Right breast cancer, pathologic stage T1bN0(sn)M0, ER/MN positive, HER2 negative, status post lumpectomy and sentinel lymph node resection with negative margin and closest margin measured less than 1 mm anterior for invasive cancer and DCIS.     INDICATION:  Postoperative radiation therapy is recommended for patient as second part of the breast preservation protocol to further reduce the likelihood of cancer recurrence.    CONSENT:  The possible risks and the side effects of radiation therapy have been discussed with patient in detail and at great length.  Questions are answered to patient's satisfaction.  Written consent was obtained.    SIMULATION:  The patient is in a supine position with a wing breast board to help keep the same position during the daily radiation therapy.  Tentative isocenter is set up in the center of the thoracic region.  We will acquire CT information to help us to better locate target and design radiation therapy field.    BLOCKS:  Custom blocks will be drawn to minimize radiation to normal tissues and to protect normal organs including, but not limited to, lungs, heart, liver, bone and soft tissue.    DOSAGE:  I plan to give her radiation therapy at 266 cGy each fraction to a total of 4256 cGy in 16 treatments targeted to the right breast only.  An additional 1000 cGy in 4 fractions will be given to the primary tumor bed using an electron. I will consider to use 3D conformer technique to help us better locate target and to protect normal tissue.      Tati Wright MD, PhD  Department of Radiation Oncology   [facility] Radiation Oncology  Tel: [phone]  Page: [phone]    [facility]  [address] [place] [address]     [place] [id number] [facility]   [place] [address]

## 2023-07-10 NOTE — PROCEDURES
Clinical Treatment Planning Note    The complex radiotherapy planning will be completed for the patient to plan the treatment for her breast cancer.  The patient had a planning CT earlier today for planning.  The treatment aids were used for planning, including headrest and Wing Board to help keep the same position during the daily radiation therapy.  The therapy planning is necessary to reduce radiotherapy dose to the normal critical organs which are not possible with simple treatment.  In addition, dose to the target and the critical structures requires three-dimensional analysis of the isodose distribution.  The planning will be done to reduce dose to the lungs, spinal cord, liver, and heart.     I will contour the clinical tumor volume  CTV , with expanded volume of planning treatment volume  PTV  on the treatment planning system.  The critical structures will be outlined, including spinal cord, lungs, heart, liver, bone and soft tissue.     Treatment planning will be done on the computer treatment planning system.  The tangential field will be used to achieve optimal coverage of the target volume.  Dose distribution to the above critical structures will be reviewed.  Isodose distribution along with the X, Y, Z plan will also be reviewed.  Custom blocking will be used to shield normal structures.  The beam s eye views will be reviewed and the digital reconstructed image will be reviewed on the planning software.      The patient will receive 4256 cGy in 16 treatments targeted to the right chest/breast region using 6-MV or 10-MV photons.  An additional 1000 cGy in 4 fractions will be planned to give to the primary tumor bed using electron.      Tati Wright MD, PhD  Department of Radiation Oncology   [facility] Radiation Oncology  Tel: [phone]  Page: [phone]    [facility]  [address] [place] [address]     [address] [place] [address]

## 2023-07-12 ENCOUNTER — APPOINTMENT (OUTPATIENT)
Dept: RADIATION ONCOLOGY | Facility: CLINIC | Age: 73
End: 2023-07-12
Attending: SURGERY
Payer: MEDICARE

## 2023-07-12 ENCOUNTER — OFFICE VISIT (OUTPATIENT)
Dept: FAMILY MEDICINE | Facility: CLINIC | Age: 73
End: 2023-07-12
Payer: MEDICARE

## 2023-07-12 VITALS
HEART RATE: 101 BPM | TEMPERATURE: 97.7 F | SYSTOLIC BLOOD PRESSURE: 138 MMHG | OXYGEN SATURATION: 97 % | RESPIRATION RATE: 17 BRPM | DIASTOLIC BLOOD PRESSURE: 91 MMHG | HEIGHT: 63 IN | WEIGHT: 159.5 LBS | BODY MASS INDEX: 28.26 KG/M2

## 2023-07-12 DIAGNOSIS — K81.0 ACUTE CHOLECYSTITIS: Primary | ICD-10-CM

## 2023-07-12 DIAGNOSIS — R03.0 ELEVATED BLOOD PRESSURE READING WITHOUT DIAGNOSIS OF HYPERTENSION: ICD-10-CM

## 2023-07-12 PROCEDURE — 99213 OFFICE O/P EST LOW 20 MIN: CPT | Performed by: FAMILY MEDICINE

## 2023-07-12 ASSESSMENT — PAIN SCALES - GENERAL: PAINLEVEL: MODERATE PAIN (4)

## 2023-07-12 NOTE — PROGRESS NOTES
Assessment & Plan     Acute cholecystitis  Clinically doing very well.  Advised probiotic, low-fat diet, brat diet to help with loose stools.  If not improving, would consider fiber supplement and then colestyramine if inadequate.  She will let me know.    Elevated blood pressure reading without diagnosis of hypertension  Mild elevation today.  She is going to be checking her blood pressures at home.  Let me know if persistent elevations.    Advised consideration of future PCV 20 and future COVID booster.  In light of recent surgery, I'd like to hold off on these.  I also like her to speak with her radiation oncologist prior to receiving.             MED REC REQUIRED  Post Medication Reconciliation Status: discharge medications reconciled, continue medications without change      Sammie Pérez MD  [facility] is a 72 year old, presenting for the following health issues:  Hospital F/U (Gall bladder - discharged 7/6)        6/5/2023     3:05 PM   Additional Questions   Roomed by marimar     Recent lumpectomy from right breast on 6/8/2023.  On 7/4/2023 developed gradually progressive right upper quadrant, epigastric, and central chest discomfort and reflux, seen in the emergency room where acute cholecystitis was identified.  She had normal liver enzymes and kidney function, mild elevation in white blood cell count.  She proceeded with laparoscopic cholecystectomy without complication.  Since being home her pain has been controlled with acetaminophen.  Tolerating diet well but is noticing tendency towards diarrhea.  Recent meeting with radiation oncologist, will delay radiation therapy start until 7/25/2023 to allow for adequate healing.           7/7/2023     1:40 PM   Post Discharge Outreach   Admission Date 7/5/2023   Reason for Admission stomach pain   Discharge Date 7/6/2023   Discharge Diagnosis acute cholecystitis   How are you doing now that you are home? better  "  How are your symptoms? (Red Flag symptoms escalate to triage hotline per guidelines) Improved   Do you feel your condition is stable enough to be safe at home until your provider visit? Yes   Does the patient have their discharge instructions?  Yes   Does the patient have questions regarding their discharge instructions?  No   Were you started on any new medications or were there changes to any of your previous medications?  Yes   Does the patient have all of their medications? Yes   Do you have questions regarding any of your medications?  No   Do you have all of your needed medical supplies or equipment (DME)?  (i.e. oxygen tank, CPAP, cane, etc.) Yes   Discharge follow-up appointment scheduled within 14 calendar days?  Yes   Discharge Follow Up Appointment Date 7/12/2023   Discharge Follow Up Appointment Scheduled with? Primary Care Provider     Hospital Follow-up Visit:    Hospital/Nursing Home/IP Rehab Facility: [facility]  Date of Admission: 7/5/2023  Date of Discharge: 7/6/2023  Reason(s) for Admission: Acute cholecystitis    Was your hospitalization related to COVID-19? No   Problems taking medications regularly:  None  Medication changes since discharge: None  Problems adhering to non-medication therapy:  None    Summary of hospitalization:  [facility] discharge summary reviewed  Diagnostic Tests/Treatments reviewed.  Follow up needed: none  Other Healthcare Providers Involved in Patient s Care:         None  Update since discharge: improved.         Plan of care communicated with patient and spouseKaushik                 Review of Systems         Objective    BP (!) 138/91 (BP Location: Right arm, Patient Position: Sitting, Cuff Size: Adult Large)   Pulse 101   Temp 97.7  F (36.5  C) (Oral)   Resp 17   Ht 1.61 m (5' 3.39\")   Wt 72.3 kg (159 lb 8 oz)   SpO2 97%   BMI 27.91 kg/m    Body mass index is 27.91 kg/m .  Physical Exam   Alert very pleasant.  Able " to climb onto the exam table without difficulty.  Heart with regular rate and rhythm.  Lungs clear.  Incisions healing well right breast and axilla.  Incisions healing well with 3 laparoscopic incisions right upper quadrant and one at umbilicus.  No erythema, drainage, or inflammation noted.  Mild minimal tenderness throughout abdomen appropriate for postoperative status.

## 2023-07-12 NOTE — PATIENT INSTRUCTIONS
"Continue low-salt diet 2.  While you are struggling with looser stools, consider a \"brat diet\" with bananas, rice, applesauce, and toast.  Continue Culturelle daily for 2 weeks.  Let me know if your loose stools are persisting despite this.    In the future, I recommend PCV 20 and a COVID booster.  Keep in touch with your radiation oncologist to discuss the best timing for these.  "

## 2023-07-13 LAB
PATH REPORT.COMMENTS IMP SPEC: NORMAL
PATH REPORT.COMMENTS IMP SPEC: NORMAL
PATH REPORT.FINAL DX SPEC: NORMAL
PATH REPORT.GROSS SPEC: NORMAL
PATH REPORT.MICROSCOPIC SPEC OTHER STN: NORMAL
PATH REPORT.RELEVANT HX SPEC: NORMAL
PHOTO IMAGE: NORMAL

## 2023-07-13 PROCEDURE — 77334 RADIATION TREATMENT AID(S): CPT | Mod: 26 | Performed by: RADIOLOGY

## 2023-07-13 PROCEDURE — 88304 TISSUE EXAM BY PATHOLOGIST: CPT | Mod: 26 | Performed by: PATHOLOGY

## 2023-07-13 PROCEDURE — 77334 RADIATION TREATMENT AID(S): CPT | Performed by: RADIOLOGY

## 2023-07-13 PROCEDURE — 77300 RADIATION THERAPY DOSE PLAN: CPT | Mod: 26 | Performed by: RADIOLOGY

## 2023-07-13 PROCEDURE — 77295 3-D RADIOTHERAPY PLAN: CPT | Performed by: RADIOLOGY

## 2023-07-13 PROCEDURE — 77300 RADIATION THERAPY DOSE PLAN: CPT | Performed by: RADIOLOGY

## 2023-07-13 PROCEDURE — 77295 3-D RADIOTHERAPY PLAN: CPT | Mod: 26 | Performed by: RADIOLOGY

## 2023-07-25 ENCOUNTER — APPOINTMENT (OUTPATIENT)
Dept: RADIATION ONCOLOGY | Facility: CLINIC | Age: 73
End: 2023-07-25
Attending: RADIOLOGY
Payer: MEDICARE

## 2023-07-25 PROCEDURE — 77280 THER RAD SIMULAJ FIELD SMPL: CPT | Mod: 26 | Performed by: RADIOLOGY

## 2023-07-25 PROCEDURE — 77387 GUIDANCE FOR RADJ TX DLVR: CPT | Performed by: RADIOLOGY

## 2023-07-25 PROCEDURE — 77412 RADIATION TX DELIVERY LVL 3: CPT | Performed by: RADIOLOGY

## 2023-07-25 PROCEDURE — 77280 THER RAD SIMULAJ FIELD SMPL: CPT | Performed by: RADIOLOGY

## 2023-07-26 ENCOUNTER — APPOINTMENT (OUTPATIENT)
Dept: RADIATION ONCOLOGY | Facility: CLINIC | Age: 73
End: 2023-07-26
Attending: RADIOLOGY
Payer: MEDICARE

## 2023-07-26 VITALS
BODY MASS INDEX: 28.02 KG/M2 | WEIGHT: 160.1 LBS | OXYGEN SATURATION: 98 % | RESPIRATION RATE: 16 BRPM | DIASTOLIC BLOOD PRESSURE: 96 MMHG | HEART RATE: 96 BPM | SYSTOLIC BLOOD PRESSURE: 143 MMHG | TEMPERATURE: 97.6 F

## 2023-07-26 DIAGNOSIS — C50.911 INVASIVE DUCTAL CARCINOMA OF BREAST, RIGHT (H): Primary | ICD-10-CM

## 2023-07-26 PROCEDURE — 77387 GUIDANCE FOR RADJ TX DLVR: CPT | Performed by: RADIOLOGY

## 2023-07-26 PROCEDURE — 77412 RADIATION TX DELIVERY LVL 3: CPT | Performed by: RADIOLOGY

## 2023-07-26 ASSESSMENT — PAIN SCALES - GENERAL: PAINLEVEL: NO PAIN (0)

## 2023-07-26 NOTE — LETTER
2023         RE: Nia Prieto  [address] [address]        Dear Colleague,    Thank you for referring your patient, Nia Prieto, to the [facility] RADIATION ONCOLOGY [place]. Please see a copy of my visit note below.    RADIATION ONCOLOGY WEEKLY TREATMENT VISIT NOTE      Assessment / Impression     Invasive ductal carcinoma of breast, right (H) [C50.911]     Tolerating radiation therapy well.  All questions and concerns addressed.    Plan:     Continue radiation treatment as prescribed.    Subjective:      HPI: Nia Prieto is a 72 year old female with  Invasive ductal carcinoma of breast, right (H) [C50.911]    The following portions of the patient's history were reviewed and updated as appropriate: allergies, current medications, past family history, past medical history, past social history, past surgical history and problem list.    Assessment                  Body Site:  Breast                           Site: Right Breast  Stereotactic Radiosurgery: No  Today's Dose: 532  Total Dose for Breast: 5256  Today's Fraction/Total Fraction Breast:   Drainage: 0: Absent                                   Sexuality Alteration                    Emotional Alteration    Copin: Effective  Comfort Alteration   KPS: 90% Can perform normal activity, minor signs of disease  Fatigue (ONS scale): 2: Mild Fatigue  Pain Location: denies   Nutrition Alteration   Anorexia: 1: Loss of appetite  Nausea: 0: None  Vomitin: None  Weight: 72.6 kg (160 lb 1.6 oz)  Skin Alteration   Skin Sensation: 0: No problem  Skin Reaction: 0: None (radiaplex given with written/verbal instructions)  AUA Assessment                                           Accompanied by       Objective:     Exam: Examination reviewed no significant changes.    Vitals:    23 1122   BP: (!) 143/96   Pulse: 96   Resp: 16   Temp: 97.6  F (36.4  C)   SpO2: 98%   Weight: 72.6 kg (160 lb 1.6 oz)    PainSc: No Pain (0)       Wt Readings from Last 8 Encounters:   07/26/23 72.6 kg (160 lb 1.6 oz)   07/12/23 72.3 kg (159 lb 8 oz)   07/06/23 73 kg (160 lb 14.4 oz)   06/27/23 74.8 kg (164 lb 12.8 oz)   06/22/23 74.9 kg (165 lb 3.2 oz)   06/08/23 74.8 kg (165 lb)   06/05/23 74.7 kg (164 lb 11.2 oz)   03/09/23 77.5 kg (170 lb 12.8 oz)       General: Alert and oriented, in no acute distress  Nia has no Erythema.  Aria chart and setup information reviewed    Tati Wright MD      Again, thank you for allowing me to participate in the care of your patient.        Sincerely,        Tati Wright MD

## 2023-07-26 NOTE — PROGRESS NOTES
RADIATION ONCOLOGY WEEKLY TREATMENT VISIT NOTE      Assessment / Impression     Invasive ductal carcinoma of breast, right (H) [C50.911]     Tolerating radiation therapy well.  All questions and concerns addressed.    Plan:     Continue radiation treatment as prescribed.    Subjective:      HPI: Nia Prieto is a 72 year old female with  Invasive ductal carcinoma of breast, right (H) [C50.911]    The following portions of the patient's history were reviewed and updated as appropriate: allergies, current medications, past family history, past medical history, past social history, past surgical history and problem list.    Assessment                  Body Site:  Breast                           Site: Right Breast  Stereotactic Radiosurgery: No  Today's Dose: 532  Total Dose for Breast: 5256  Today's Fraction/Total Fraction Breast:   Drainage: 0: Absent                                   Sexuality Alteration                    Emotional Alteration    Copin: Effective  Comfort Alteration   KPS: 90% Can perform normal activity, minor signs of disease  Fatigue (ONS scale): 2: Mild Fatigue  Pain Location: denies   Nutrition Alteration   Anorexia: 1: Loss of appetite  Nausea: 0: None  Vomitin: None  Weight: 72.6 kg (160 lb 1.6 oz)  Skin Alteration   Skin Sensation: 0: No problem  Skin Reaction: 0: None (radiaplex given with written/verbal instructions)  AUA Assessment                                           Accompanied by       Objective:     Exam: Examination reviewed no significant changes.    Vitals:    23 1122   BP: (!) 143/96   Pulse: 96   Resp: 16   Temp: 97.6  F (36.4  C)   SpO2: 98%   Weight: 72.6 kg (160 lb 1.6 oz)   PainSc: No Pain (0)       Wt Readings from Last 8 Encounters:   23 72.6 kg (160 lb 1.6 oz)   23 72.3 kg (159 lb 8 oz)   23 73 kg (160 lb 14.4 oz)   23 74.8 kg (164 lb 12.8 oz)   23 74.9 kg (165 lb 3.2 oz)   23 74.8 kg (165 lb)    06/05/23 74.7 kg (164 lb 11.2 oz)   03/09/23 77.5 kg (170 lb 12.8 oz)       General: Alert and oriented, in no acute distress  Nia has no Erythema.  Aria chart and setup information reviewed    Tati Wright MD

## 2023-07-27 ENCOUNTER — APPOINTMENT (OUTPATIENT)
Dept: RADIATION ONCOLOGY | Facility: CLINIC | Age: 73
End: 2023-07-27
Attending: RADIOLOGY
Payer: MEDICARE

## 2023-07-27 PROCEDURE — 77412 RADIATION TX DELIVERY LVL 3: CPT | Performed by: RADIOLOGY

## 2023-07-27 PROCEDURE — 77387 GUIDANCE FOR RADJ TX DLVR: CPT | Performed by: RADIOLOGY

## 2023-07-28 ENCOUNTER — APPOINTMENT (OUTPATIENT)
Dept: RADIATION ONCOLOGY | Facility: CLINIC | Age: 73
End: 2023-07-28
Attending: RADIOLOGY
Payer: MEDICARE

## 2023-07-28 PROCEDURE — 77387 GUIDANCE FOR RADJ TX DLVR: CPT | Performed by: STUDENT IN AN ORGANIZED HEALTH CARE EDUCATION/TRAINING PROGRAM

## 2023-07-28 PROCEDURE — 77412 RADIATION TX DELIVERY LVL 3: CPT | Performed by: STUDENT IN AN ORGANIZED HEALTH CARE EDUCATION/TRAINING PROGRAM

## 2023-07-31 ENCOUNTER — APPOINTMENT (OUTPATIENT)
Dept: RADIATION ONCOLOGY | Facility: CLINIC | Age: 73
End: 2023-07-31
Attending: RADIOLOGY
Payer: MEDICARE

## 2023-07-31 PROCEDURE — 77387 GUIDANCE FOR RADJ TX DLVR: CPT | Performed by: STUDENT IN AN ORGANIZED HEALTH CARE EDUCATION/TRAINING PROGRAM

## 2023-07-31 PROCEDURE — 77412 RADIATION TX DELIVERY LVL 3: CPT | Performed by: STUDENT IN AN ORGANIZED HEALTH CARE EDUCATION/TRAINING PROGRAM

## 2023-07-31 PROCEDURE — 77427 RADIATION TX MANAGEMENT X5: CPT | Performed by: RADIOLOGY

## 2023-07-31 PROCEDURE — 77336 RADIATION PHYSICS CONSULT: CPT | Performed by: RADIOLOGY

## 2023-08-01 ENCOUNTER — APPOINTMENT (OUTPATIENT)
Dept: RADIATION ONCOLOGY | Facility: CLINIC | Age: 73
End: 2023-08-01
Attending: RADIOLOGY
Payer: MEDICARE

## 2023-08-01 PROCEDURE — 77412 RADIATION TX DELIVERY LVL 3: CPT | Performed by: STUDENT IN AN ORGANIZED HEALTH CARE EDUCATION/TRAINING PROGRAM

## 2023-08-01 PROCEDURE — 77387 GUIDANCE FOR RADJ TX DLVR: CPT | Performed by: STUDENT IN AN ORGANIZED HEALTH CARE EDUCATION/TRAINING PROGRAM

## 2023-08-01 NOTE — PATIENT INSTRUCTIONS
Assessing Cancer Risk  Cancer is a common diagnosis which impacts many families.  Individuals may develop cancer due to environmental factors (such as exposures and lifestyle), aging, genetic predisposition, or a combination of these factors.      Only about 5-10% of cancers are thought to be due to an inherited cancer susceptibility gene.    These families often have:  Several people with the same or related types of cancer  Cancers diagnosed at a young age (before age 50)  Individuals with more than one primary cancer  Multiple generations of the family affected with cancer    Comprehensive Breast and Gynecologic Cancer Panel  We each inherit two copies of every gene in our bodies: one from our mother, and one from our father. Each gene has a specific job to do.  When a gene has a mistake or  mutation  in it, it does not work like it should.     Some people may be candidates for genetic testing of more than one gene.  For these families, genetic testing using a cancer panel may be offered. These panels will test different genes at once known to increase the risk for breast, ovarian, uterine, and/or other cancers.    This handout will review common hereditary breast and gynecologic cancer syndromes. The genes that will be discussed in this handout are: KAELA, BRCA1, BRCA2, BRIP1, CDH1, CHEK2, MLH1, MSH2, MSH6, PMS2, EPCAM, PTEN, PALB2, RAD51C, RAD51D, and TP53.    The purpose of this handout is to serve as a brief summary of the breast and gynecologic cancer risk genes that have published clinical management guidelines for individuals who are found to carry a mutation. Inheriting a mutation does not mean a person will develop cancer, but it does significantly increase their risk above the general population risk.     ______________________________________________________________________________    Hereditary Breast and Ovarian Cancer Syndrome (BRCA1 and BRCA2)  A single mutation in one of the copies of BRCA1 or  BRCA2 increases the risk for breast and ovarian cancer, among others.  The risk for pancreatic cancer and melanoma may also be slightly increased in some families.  The chart below shows the chance that someone with a BRCA mutation would develop cancer in his or her lifetime1,2,3,4.       Lifetime Cancer Risks    General Population BRCA1  BRCA2   Breast  12% >60% >60%   Ovarian  1-2% 39-58% 13-29%   Prostate 12% 7-26% 19-61%   Male Breast 0.1% 0.2-1.2% 1.8-7.1%   Pancreas 1-2% Up to 5% 5-10%     A person s ethnic background is also important to consider, as individuals of [employer]azi [employer] ancestry have a higher chance of having a BRCA gene mutation.  There are three BRCA mutations that occur more frequently in this population.      Miranda Syndrome (MLH1, MSH2, MSH6, PMS2, and EPCAM)  Currently five genes are known to cause Miranda Syndrome: MLH1, MSH2, MSH6, PMS2, and EPCAM.  A single mutation in one of the Miranda Syndrome genes increases the risk for colon, endometrial, ovarian, and stomach cancers.  Other cancers that occur less commonly in Miranda Syndrome include urinary tract, skin, and brain cancers.  The chart below shows the chance that a person with Miranda syndrome would develop cancer in his or her lifetime5.      Lifetime Cancer Risks    General Population Miranda Syndrome   Colon 5% 10-61%   Endometrial 3% 13-57%   Ovarian 1-2% 1-38%   Stomach <1% 1-9%   *Cancer risk varies depending on Miranda syndrome gene found      Cowden Syndrome (PTEN)  Cowden syndrome is a hereditary condition that increases the risk for breast, thyroid, endometrial, colon, and kidney cancer.  Cowden syndrome is caused by a mutation in the PTEN gene.  A single mutation in one of the copies of PTEN causes Cowden syndrome and increases cancer risk.  The chart below shows the chance that someone with a PTEN mutation would develop cancer in their lifetime6,7.  Other benign features seen in some individuals with Cowden syndrome include benign  skin lesions (facial papules, keratoses, lipomas), learning disability, autism, thyroid nodules, colon polyps, and larger head size.     Lifetime Cancer Risks    General Population Cowden   Breast 12% 40-60%*   Thyroid 1% Up to 38%   Renal 1-2% Up to 35%   Endometrial 3% Up to 28%   Colon 5% Up to 9%   Melanoma 2-3% Up to 6%   *Emerging data suggests the risk for breast cancer could be greater than 60%               Li-Fraumeni Syndrome (TP53)  Li-Fraumeni Syndrome (LFS) is a cancer predisposition syndrome caused by a mutation in the TP53 gene. A single mutation in one of the copies of TP53 increases the risk for multiple cancers. Individuals with LFS are at an increased risk for developing cancer at a young age. The lifetime risk for development of a LFS-associated cancer is 50% by age 30 and 90% by age 60.   Core Cancers: Sarcomas, Breast, Brain, Lung, Leukemias/Lymphomas, Adrenocortical carcinomas  Other Cancers: Gastrointestinal, Thyroid, Skin, Genitourinary       Hereditary Diffuse Gastric Cancer (CDH1)  Currently, one gene is known to cause hereditary diffuse gastric cancer (HDGC): CDH1.  Individuals with HDGC are at increased risk for diffuse gastric cancer and lobular breast cancer. Of people diagnosed with HDGC, 30-50% have a mutation in the CDH1 gene.  This suggests there are likely other genes that may cause HDGC that have not been identified yet.      Lifetime Cancer Risks    General Population HDGC   Diffuse Gastric  <1% ~80%   Breast 12% 41-60%       Additional Genes    KAELA  KAELA is a moderate-risk breast cancer gene. Women who have a mutation in KAELA can have between a 2-4 fold increased risk for breast cancer compared to the general population8. KAELA mutations have also been associated with increased risk for pancreatic cancer between 5-10%9. Individuals who inherit two KAELA mutations have a condition called ataxia-telangiectasia (AT).  This rare autosomal recessive condition affects the nervous system  and immune system, and is associated with progressive cerebellar ataxia beginning in childhood. Individuals with ataxia-telangiectasia often have a weakened immune system and have an increased risk for childhood cancers.    PALB2  Mutations in PALB2 have been shown to increase the risk of breast cancer up to 41-60% in some families; where individuals fall within this risk range is dependent upon family [id number]. PALB2 mutations have also been associated with increased risk for pancreatic cancer between 5-10%.  Individuals who inherit two PALB2 mutations--one from their mother and one from their father--have a condition called Fanconi Anemia.  This rare autosomal recessive condition is associated with short stature, developmental delay, bone marrow failure, and increased risk for childhood cancers.    CHEK2   CHEK2 is a moderate-risk breast cancer gene.  Women who have a mutation in CHEK2 have around a 2-4 fold increased risk for breast cancer compared to the general population, and this risk may be higher depending upon family history.11,12,13 The risk of colon cancer may be twice as high as the general population risk of colon cancer of 5%. Mutations in CHEK2 have also been shown to increase the risk of other cancers, including prostate, however these cancer risks are currently not well understood.    BRIP1, RAD51C and RAD51D  Mutations in RAD51C and RAD51D have been shown to increase the risk of ovarian cancer and breast cancer 14,. Mutations in BRIP1 have been shown to increase the risk of ovarian cancer and possibly female breast cancer 15 .       Lifetime Cancer Risk    General Population        BRIP1   RAD51C  RAD51D   Breast 12% Not well defined 20-40% 20-40%   Ovarian 1-2% 5-15% 10-15% 10-20%     ______________________________________________________________  Inheritance  All of the cancer syndromes reviewed above are inherited in an autosomal dominant pattern.  This means that if a parent has a mutation,  each of their children will have a 50% chance of inheriting that same mutation. Therefore, each child --male or female-- would have a 50% chance of being at increased risk for developing cancer.    Image obtained from Genetics Home Reference, 2013     Mutations in some genes can occur de jamarcus, which means that a person s mutation occurred for the first time in them and was not inherited from a parent.  Now that they have the mutation, however, it can be passed on to future generations.    Genetic Testing  Genetic testing involves a blood test and will look for any harmful mutations that are associated with increased cancer risk.  If possible, it is recommended that the person(s) who has had cancer be tested before other family members.  That person will give us the most useful information about whether or not a specific gene is associated with the cancer in the family.    Results  There are three possible results of genetic testing:  Positive--a harmful mutation was identified in one or more of the genes  Negative--no mutations were identified in any of the genes tested  Variant of unknown significance--a variation in one of the genes was identified, but it is unclear how this impacts cancer risk in the family    Advantages and Disadvantages   There are advantages and disadvantages to genetic testing.    Advantages  May clarify your cancer risk  Can help you make medical decisions  May explain the cancers in your family  May give useful information to your family members (if you share your results)    Disadvantages  Possible negative emotional impact of learning about inherited cancer risk  Uncertainty in interpreting a negative test result in some situations  Possible genetic discrimination concerns (see below)    Genetic Information Nondiscrimination Act (HOLLEY)  The Genetic Information Nondiscrimination Act of 2008 (HOLLEY) is a federal law that protects individuals from health insurance or employment discrimination  based on a genetic test result alone (with some exceptions, including employers with fewer than 15 employees, and ).  Although rare, HOLLEY  does not cover discrimination protections in terms of life insurance, long term care, or disability insurances.  Visit the National Human Domainex Research [place] website to learn more.    Reducing Cancer Risk  All of the genes described in this handout have nationally recognized cancer screening guidelines that would be recommended for individuals who test positive.  In addition to increased cancer screening, surgeries may be offered or recommended to reduce cancer risk.  Recommendations are based upon an individual s genetic test result as well as their personal and family history of cancer.    Questions to Think About Regarding Genetic Testing:  What effect will the test result have on me and my relationship with my family members if I have an inherited gene mutation?  If I don t have a gene mutation?  Should I share my test results, and how will my family react to this news, which may also affect them?  Are my children ready to learn new information that may one day affect their own health?    Hereditary Cancer Resources    FORCE: Facing Our Risk of Cancer Empowered facingourrisk.org   Bright Pink bebrightpink.org   Li-Fraumeni Syndrome Association lfsassociation.org   PTEN World PTENworld.com   No stomach for cancer, Inc. nostomachforcancer.org   Stomach cancer relief network Scrnet.org   Collaborative Group of the Americas on Inherited Colorectal Cancer (CGA) cgaicc.com    Cancer Care cancercare.org   American Cancer Society (ACS) cancer.org   National Cancer [place] (NCI) cancer.gov     Please call us if you have any questions or concerns.   Cancer Risk Management Program [phone] ([phone])  Haseeb Wiley, MS [facility]  [phone]  Cary Bai, MS, [facility] [phone]  Hafsa Hernandez, MS, [facility]  [phone]  Apple Thayer, MS, [facility]  [phone]  Allison Howe,  MS, [facility]  [phone]  Madelyn Alcazar, MS, [facility] [phone]  Ariana Louis, MS, [facility] [phone]    References  Arsen Rm PDP, Abigail S, Ubaldo HOFFMANN, Aleksandra JE, Sharon JL, Zonia N, Francisco H, Jordin O, Adriel A, Pasini B, Radipetros P, Manbuck S, Gonzalez DM, Renteria N, Alon E, Lance H, Bryson E, Akila J, Gronnazia J, Quirino B, Tulinius H, Thorlacius S, Eerola H, Nevanlinna H, Yesy K, Pia OP. Average risks of breast and ovarian cancer associated with BRCA1 or BRCA2 mutations detected in case series unselected for family history: a combined analysis of 222 studies. Am J Hum Areli. 2003;72:1117-30.  Hal N, Jo-Ann M, Mirta G.  BRCA1 and BRCA2 Hereditary Breast and Ovarian Cancer. Gene Reviews online. 2013.  Shilo YC, Davy S, Herbert G, Coto S. Breast cancer risk among male BRCA1 and BRCA2 mutation carriers. J Natl Cancer Inst. 2007;99:1811-4.  Joselito CHOWDHURY, Juan Manuel I, Michael J, Vita E, Michelle ER, Derek F. Risk of breast cancer in male BRCA2 carriers. J Med Areli. 2010;47:710-1.  National Comprehensive Cancer Network. Clinical practice guidelines in oncology, colorectal cancer screening. Available online (registration required). 2015.  Robert MH, Mena J, Roma J, Genet HINES, Aurelia MS, Eng C. Lifetime cancer risks in individuals with germline PTEN mutations. Clin Cancer Res. 2012;18:400-7.  Celina R. Cowden Syndrome: A Critical Review of the Clinical Literature. J Areli . 2009:18:13-27.  Jon REEVES, Darrell REILLY, Michael S, Eun P, Bryn T, Emerald M, Germain B, Cristo H, Gaston R, Mala K, Wei L, Joselito CHOWDHURY, Gonzalez REILLY, Iglesia DF, Sylwia MR, The Breast Cancer Susceptibility Collaboration (UK) & Octaviano CHIU. KAELA mutations that cause ataxia-telangiectasia are breast cancer susceptibility alleles. Nature Genetics. 2006;38:873-875  Vinod N , Smiley Y, Christina J, Tom L, Eddi TEE , Sana ML, Tiesha S, Akira AG, Conner S, Caitlyn ML, Maribell J , Caryl R, Koki NORMAN, Dallas  JR, Maria Luz VE, Mary M, Vozoiestein B, Sheila N, Lynette RH, Samantha KW, and Williams AP. KAELA mutations in patients with hereditary pancreatic cancer. Cancer Discover. 2012;2:41-46  Evens GOYAL., et al. Breast-Cancer Risk in Families with Mutations in PALB2. NEJM. 2014; 371(6):497-506.  CHEK2 Breast Cancer Case-Control Consortium. CHEK2*1100delC and susceptibility to breast cancer: A collaborative analysis involving 10,860 breast cancer cases and 9,065 controls from 10 studies. Am J Hum Areli, 74 (2004), pp. 8367-1260  Chery T, Nataly S, Tenzin K, et al. Spectrum of Mutations in BRCA1, BRCA2, CHEK2, and TP53 in Families at High Risk of Breast Cancer. VIANNEY. 2006;295(12):9299-5149.   Geovani C, Jessica D, Javon REEVES, et al. Risk of breast cancer in women with a CHEK2 mutation with and without a family history of breast cancer. J Clin Oncol. 2011;29:9514-1532.  Song H, Kevins E, Ramus SJ, et al. Contribution of germline mutations in the RAD51B, RAD51C, and RAD51D genes to ovarian cancer in the population. J Clin Oncol. 2015;33(26):0117-1704. Doi:10.1200/JCO.2015.61.2408.  Carlos T, Nash DF, Trini P, et al. Mutations in BRIP1 confer high risk of ovarian cancer. Rayne Areli. 2011;43(11):0749-7254. doi:10.1038/ng.955.

## 2023-08-02 ENCOUNTER — APPOINTMENT (OUTPATIENT)
Dept: RADIATION ONCOLOGY | Facility: CLINIC | Age: 73
End: 2023-08-02
Attending: RADIOLOGY
Payer: MEDICARE

## 2023-08-02 VITALS
BODY MASS INDEX: 27.77 KG/M2 | DIASTOLIC BLOOD PRESSURE: 90 MMHG | SYSTOLIC BLOOD PRESSURE: 138 MMHG | OXYGEN SATURATION: 97 % | TEMPERATURE: 97.9 F | RESPIRATION RATE: 16 BRPM | HEART RATE: 83 BPM | WEIGHT: 158.7 LBS

## 2023-08-02 DIAGNOSIS — C50.911 INVASIVE DUCTAL CARCINOMA OF BREAST, RIGHT (H): Primary | ICD-10-CM

## 2023-08-02 PROCEDURE — 77387 GUIDANCE FOR RADJ TX DLVR: CPT | Performed by: RADIOLOGY

## 2023-08-02 PROCEDURE — 77412 RADIATION TX DELIVERY LVL 3: CPT | Performed by: RADIOLOGY

## 2023-08-02 NOTE — LETTER
2023         RE: Nia Prieto  [address] [facility] [address]        Dear Colleague,    Thank you for referring your patient, Nia Prieto, to the [facility] RADIATION ONCOLOGY [place]. Please see a copy of my visit note below.    RADIATION ONCOLOGY WEEKLY TREATMENT VISIT NOTE      Assessment / Impression     Invasive ductal carcinoma of breast, right (H) [C50.911]    Tolerating radiation therapy well.  All questions and concerns addressed.    Plan:     Continue radiation treatment as prescribed.    Discussed with patient about skin care.    Subjective:      HPI: Nia Prieto is a 73 year old female with  Invasive ductal carcinoma of breast, right (H) [C50.911]    The following portions of the patient's history were reviewed and updated as appropriate: allergies, current medications, past family history, past medical history, past social history, past surgical history and problem list.    Assessment                  Body Site:  Breast                           Site: Rt. Breast  Stereotactic Radiosurgery: No  Today's Dose: 1862  Total Dose for Breast: 5256  Today's Fraction/Total Fraction Breast:   Drainage: 0: Absent                                   Sexuality Alteration                    Emotional Alteration    Copin: Effective  Comfort Alteration   KPS: 90% Can perform normal activity, minor signs of disease  Fatigue (ONS scale): 2: Mild Fatigue  Pain Location: denies   Nutrition Alteration   Anorexia: 0: None  Nausea: 0: None  Vomitin: None  Weight: 72 kg (158 lb 11.2 oz)  Skin Alteration   Skin Sensation: 0: No problem  Skin Reaction: 0: None  AUA Assessment                                           Accompanied by       Objective:     Exam: mild Erythema.    Vitals:    23 1111   BP: (!) 138/90   Pulse: 83   Resp: 16   Temp: 97.9  F (36.6  C)   TempSrc: Oral   SpO2: 97%   Weight: 72 kg (158 lb 11.2 oz)       Wt Readings from Last 8  Encounters:   08/02/23 72 kg (158 lb 11.2 oz)   07/26/23 72.6 kg (160 lb 1.6 oz)   07/12/23 72.3 kg (159 lb 8 oz)   07/06/23 73 kg (160 lb 14.4 oz)   06/27/23 74.8 kg (164 lb 12.8 oz)   06/22/23 74.9 kg (165 lb 3.2 oz)   06/08/23 74.8 kg (165 lb)   06/05/23 74.7 kg (164 lb 11.2 oz)       General: Alert and oriented, in no acute distress  Nia has mild Erythema.  Aria chart and setup information reviewed    Tati Wright MD      Again, thank you for allowing me to participate in the care of your patient.        Sincerely,        Tati Wright MD

## 2023-08-02 NOTE — PROGRESS NOTES
RADIATION ONCOLOGY WEEKLY TREATMENT VISIT NOTE      Assessment / Impression     Invasive ductal carcinoma of breast, right (H) [C50.911]    Tolerating radiation therapy well.  All questions and concerns addressed.    Plan:     Continue radiation treatment as prescribed.    Discussed with patient about skin care.    Subjective:      HPI: Nia Prieto is a 73 year old female with  Invasive ductal carcinoma of breast, right (H) [C50.911]    The following portions of the patient's history were reviewed and updated as appropriate: allergies, current medications, past family history, past medical history, past social history, past surgical history and problem list.    Assessment                  Body Site:  Breast                           Site: Rt. Breast  Stereotactic Radiosurgery: No  Today's Dose: 1862  Total Dose for Breast: 5256  Today's Fraction/Total Fraction Breast:   Drainage: 0: Absent                                   Sexuality Alteration                    Emotional Alteration    Copin: Effective  Comfort Alteration   KPS: 90% Can perform normal activity, minor signs of disease  Fatigue (ONS scale): 2: Mild Fatigue  Pain Location: denies   Nutrition Alteration   Anorexia: 0: None  Nausea: 0: None  Vomitin: None  Weight: 72 kg (158 lb 11.2 oz)  Skin Alteration   Skin Sensation: 0: No problem  Skin Reaction: 0: None  AUA Assessment                                           Accompanied by       Objective:     Exam: mild Erythema.    Vitals:    23 1111   BP: (!) 138/90   Pulse: 83   Resp: 16   Temp: 97.9  F (36.6  C)   TempSrc: Oral   SpO2: 97%   Weight: 72 kg (158 lb 11.2 oz)       Wt Readings from Last 8 Encounters:   23 72 kg (158 lb 11.2 oz)   23 72.6 kg (160 lb 1.6 oz)   23 72.3 kg (159 lb 8 oz)   23 73 kg (160 lb 14.4 oz)   23 74.8 kg (164 lb 12.8 oz)   23 74.9 kg (165 lb 3.2 oz)   23 74.8 kg (165 lb)   23 74.7 kg (164 lb 11.2  oz)       General: Alert and oriented, in no acute distress  Nia has mild Erythema.  Aria chart and setup information reviewed    Tati Wright MD

## 2023-08-03 ENCOUNTER — APPOINTMENT (OUTPATIENT)
Dept: RADIATION ONCOLOGY | Facility: CLINIC | Age: 73
End: 2023-08-03
Attending: RADIOLOGY
Payer: MEDICARE

## 2023-08-03 PROCEDURE — 77387 GUIDANCE FOR RADJ TX DLVR: CPT | Performed by: STUDENT IN AN ORGANIZED HEALTH CARE EDUCATION/TRAINING PROGRAM

## 2023-08-03 PROCEDURE — 77412 RADIATION TX DELIVERY LVL 3: CPT | Performed by: STUDENT IN AN ORGANIZED HEALTH CARE EDUCATION/TRAINING PROGRAM

## 2023-08-04 ENCOUNTER — APPOINTMENT (OUTPATIENT)
Dept: RADIATION ONCOLOGY | Facility: CLINIC | Age: 73
End: 2023-08-04
Attending: RADIOLOGY
Payer: MEDICARE

## 2023-08-04 PROCEDURE — 77307 TELETHX ISODOSE PLAN CPLX: CPT | Performed by: RADIOLOGY

## 2023-08-04 PROCEDURE — 77334 RADIATION TREATMENT AID(S): CPT | Performed by: RADIOLOGY

## 2023-08-04 PROCEDURE — 77387 GUIDANCE FOR RADJ TX DLVR: CPT | Performed by: STUDENT IN AN ORGANIZED HEALTH CARE EDUCATION/TRAINING PROGRAM

## 2023-08-04 PROCEDURE — 77334 RADIATION TREATMENT AID(S): CPT | Mod: 26 | Performed by: RADIOLOGY

## 2023-08-04 PROCEDURE — 77307 TELETHX ISODOSE PLAN CPLX: CPT | Mod: 26 | Performed by: RADIOLOGY

## 2023-08-04 PROCEDURE — 77412 RADIATION TX DELIVERY LVL 3: CPT | Performed by: STUDENT IN AN ORGANIZED HEALTH CARE EDUCATION/TRAINING PROGRAM

## 2023-08-06 ENCOUNTER — HEALTH MAINTENANCE LETTER (OUTPATIENT)
Age: 73
End: 2023-08-06

## 2023-08-07 ENCOUNTER — APPOINTMENT (OUTPATIENT)
Dept: RADIATION ONCOLOGY | Facility: CLINIC | Age: 73
End: 2023-08-07
Attending: RADIOLOGY
Payer: MEDICARE

## 2023-08-07 ENCOUNTER — VIRTUAL VISIT (OUTPATIENT)
Dept: ONCOLOGY | Facility: CLINIC | Age: 73
End: 2023-08-07
Attending: GENETIC COUNSELOR, MS
Payer: MEDICARE

## 2023-08-07 DIAGNOSIS — Z80.3 FAMILY HISTORY OF MALIGNANT NEOPLASM OF BREAST: ICD-10-CM

## 2023-08-07 DIAGNOSIS — C50.911 INVASIVE DUCTAL CARCINOMA OF BREAST, STAGE 1, RIGHT (H): Primary | ICD-10-CM

## 2023-08-07 PROCEDURE — 77387 GUIDANCE FOR RADJ TX DLVR: CPT | Performed by: STUDENT IN AN ORGANIZED HEALTH CARE EDUCATION/TRAINING PROGRAM

## 2023-08-07 PROCEDURE — 77412 RADIATION TX DELIVERY LVL 3: CPT | Performed by: STUDENT IN AN ORGANIZED HEALTH CARE EDUCATION/TRAINING PROGRAM

## 2023-08-07 PROCEDURE — 999N000069 HC STATISTIC GENETIC COUNSELING, < 16 MIN: Mod: GT | Performed by: GENETIC COUNSELOR, MS

## 2023-08-07 PROCEDURE — 77427 RADIATION TX MANAGEMENT X5: CPT | Performed by: RADIOLOGY

## 2023-08-07 PROCEDURE — 77336 RADIATION PHYSICS CONSULT: CPT | Performed by: RADIOLOGY

## 2023-08-07 NOTE — PROGRESS NOTES
"8/7/2023    Referring Provider: Lillian Marie MD    Presenting Information:  I spoke to Nia by video today to discuss her genetic testing results. Her blood was drawn on 5/9/23. The Comprehensive 40 Cancer panel was ordered from [facility] ([facility] at [facility]. This testing was done because of Nia's personal and family history of breast cancer.    Genetic Testing Result: NEGATIVE  Nia tested negative for mutations in the APC, KAELA, AXIN2, BAP1, BARD1, BMPR1A, BRCA1, BRCA2, BRIP1, CDH1, CDK4, CDKN2A, CHEK2, DICER1, EPCAM, GREM1, HOXB13, MITF, MLH1, MRE11A, MSH2, MSH3, MSH6, MUTYH, NBN, NF1, NTHL1, PALB2, PMS2, POLD1, POLE, POT1, PTEN, RAD50, RAD51C, RAD51D, SMAD4, SMARCA4, STK11, and TP53 gene. No mutations were found in the 40 genes analyzed. We reviewed the autosomal dominant inheritance of these genes. Nia cannot pass on a mutation in any of these genes to her children based on this test result. Mutations in these genes do not skip generations.       A copy of the test report can be found in the Laboratory tab, dated 6/29/23, and named \"Next generation sequencing\".     Interpretation:  We discussed several different interpretations of this negative test result.    One explanation may be that there is a different gene or combination of genes and environment that are associated with the cancers in this family.  It is possible that her mother or other close relative with cancer did have a mutation in one of these genes and she did not inherit it.  There is also a small possibility that there is a mutation in one of these genes, and the testing laboratory could not find it with their current testing methods.       Screening:  Based on this negative test result, it is important for Nia and her relatives to refer back to the family history for appropriate cancer screening.    Nia s close female relatives remain at increased risk for breast cancer given their " family history. Breast cancer screening is generally recommended to begin approximately 10 years younger than the earliest age of breast cancer diagnosis in the family, or at age 40, whichever comes first. In this family, screening may begin at age 40. Breast screening options should be discussed with an individual's primary care provider and a genetic counselor, to determine at what age to begin screening, what screening is appropriate, and if additional screening (such as breast MRI) is necessary based on personal/family history factors.   Other population cancer screening options, such as those recommended by the American Cancer Society and the National Comprehensive Cancer Network (NCCN), are also appropriate for Nia and her family. These screening recommendations may change if there are changes to Nia's personal and/or family history of cancer. Final screening recommendations should be made by each individual's primary care provider.      Summary:  We do not have an explanation for Nia's personal or family history of cancer. While no genetic changes were identified, Nia may still be at risk for certain cancers due to family history, environmental factors, or other genetic causes not identified by this test. Because of that, it is important that she continue with cancer screening based on her personal and family history as discussed above.    Genetic testing is rapidly advancing, and new cancer susceptibility genes will most likely be identified in the future. Therefore, I encouraged Nia to contact me annually or if there are changes in her personal or family history. This may change how we assess her cancer risk, screening, and the testing we would offer.    Plan:  1.  A copy of the test results will be mailed to Nia.  2. She plans to follow-up with her other providers.  3. She should contact me regularly, or sooner if her family history changes.    If Nia has any further  questions, I encouraged her to contact me via [employer].    Time spent on video: 7 minutes.    Haseeb Wiley MS, [facility] [place]  Licensed, Certified Genetic Counselor      Virtual Visit Details    Type of service:  Video Visit     Originating Location (pt. Location): Home  Distant Location (provider location):  Off-site  Platform used for Video Visit: [employer]

## 2023-08-07 NOTE — LETTER
"August 7, 2023    Nia Prieto  [address] [facility] [place] [address]      Dear Nia,    It was a pleasure speaking with you over video on 8/7/2023. Here is a copy of the progress note from our discussion. If you have any additional questions, please feel free to call.    Referring Provider: Lillian Marie MD    Presenting Information:  I spoke to Nia by video today to discuss her genetic testing results. Her blood was drawn on 5/9/23. The Comprehensive 40 Cancer panel was ordered from [facility] ([facility] at [facility]. This testing was done because of Nia's personal and family history of breast cancer.    Genetic Testing Result: NEGATIVE  Nia tested negative for mutations in the APC, KAELA, AXIN2, BAP1, BARD1, BMPR1A, BRCA1, BRCA2, BRIP1, CDH1, CDK4, CDKN2A, CHEK2, DICER1, EPCAM, GREM1, HOXB13, MITF, MLH1, MRE11A, MSH2, MSH3, MSH6, MUTYH, NBN, NF1, NTHL1, PALB2, PMS2, POLD1, POLE, POT1, PTEN, RAD50, RAD51C, RAD51D, SMAD4, SMARCA4, STK11, and TP53 gene. No mutations were found in the 40 genes analyzed. We reviewed the autosomal dominant inheritance of these genes. Nia cannot pass on a mutation in any of these genes to her children based on this test result. Mutations in these genes do not skip generations.       A copy of the test report can be found in the Laboratory tab, dated 6/29/23, and named \"Next generation sequencing\".     Interpretation:  We discussed several different interpretations of this negative test result.    One explanation may be that there is a different gene or combination of genes and environment that are associated with the cancers in this family.  It is possible that her mother or other close relative with cancer did have a mutation in one of these genes and she did not inherit it.  There is also a small possibility that there is a mutation in one of these genes, and the testing laboratory could not find it with their current testing " methods.         Screening:  Based on this negative test result, it is important for Nia and her relatives to refer back to the family history for appropriate cancer screening.    Nia s close female relatives remain at increased risk for breast cancer given their family history. Breast cancer screening is generally recommended to begin approximately 10 years younger than the earliest age of breast cancer diagnosis in the family, or at age 40, whichever comes first. In this family, screening may begin at age 40. Breast screening options should be discussed with an individual's primary care provider and a genetic counselor, to determine at what age to begin screening, what screening is appropriate, and if additional screening (such as breast MRI) is necessary based on personal/family history factors.   Other population cancer screening options, such as those recommended by the American Cancer Society and the National Comprehensive Cancer Network (NCCN), are also appropriate for Nia and her family. These screening recommendations may change if there are changes to Nia's personal and/or family history of cancer. Final screening recommendations should be made by each individual's primary care provider.      Summary:  We do not have an explanation for Nia's personal or family history of cancer. While no genetic changes were identified, Nia may still be at risk for certain cancers due to family history, environmental factors, or other genetic causes not identified by this test. Because of that, it is important that she continue with cancer screening based on her personal and family history as discussed above.    Genetic testing is rapidly advancing, and new cancer susceptibility genes will most likely be identified in the future. Therefore, I encouraged Nia to contact me annually or if there are changes in her personal or family history. This may change how we assess her cancer risk,  screening, and the testing we would offer.    Plan:  1.  A copy of the test results will be mailed to Nia.  2. She plans to follow-up with her other providers.  3. She should contact me regularly, or sooner if her family history changes.    If Nia has any further questions, I encouraged her to contact me via [employer].    Time spent on video: 7 minutes.    Haseeb Wiley MS, [facility] [place]  Licensed, Certified Genetic Counselor

## 2023-08-07 NOTE — NURSING NOTE
Is the patient currently in the state of [place]? YES    Visit mode:VIDEO    If the visit is dropped, the patient can be reconnected by: VIDEO VISIT: Send to e-mail at: [email or web address]    Will anyone else be joining the visit? YES: How would they like to receive their invitation? Send to e-mail at: [email or web address]      How would you like to obtain your AVS? MyChart    Are changes needed to the allergy or medication list? NO    Reason for visit: DRISS Thompson     ambulatory

## 2023-08-07 NOTE — Clinical Note
"    8/7/2023         RE: Nia Prieto  [address] [address]        Dear Colleague,    Thank you for referring your patient, Nia Prieto, to the [facility]. Please see a copy of my visit note below.    8/7/2023    Referring Provider: Lillian Marie MD    Presenting Information:  I spoke to Nia by video today to discuss her genetic testing results. Her blood was drawn on 5/9/23. The Comprehensive 40 Cancer panel was ordered from [facility] ([facility] at [facility]. This testing was done because of Nia's personal and family history of breast cancer.    Genetic Testing Result: NEGATIVE  Nia tested negative for mutations in the APC, KAELA, AXIN2, BAP1, BARD1, BMPR1A, BRCA1, BRCA2, BRIP1, CDH1, CDK4, CDKN2A, CHEK2, DICER1, EPCAM, GREM1, HOXB13, MITF, MLH1, MRE11A, MSH2, MSH3, MSH6, MUTYH, NBN, NF1, NTHL1, PALB2, PMS2, POLD1, POLE, POT1, PTEN, RAD50, RAD51C, RAD51D, SMAD4, SMARCA4, STK11, and TP53 gene. No mutations were found in the 40 genes analyzed. We reviewed the autosomal dominant inheritance of these genes. Nia cannot pass on a mutation in any of these genes to her children based on this test result. Mutations in these genes do not skip generations.       A copy of the test report can be found in the Laboratory tab, dated 6/29/23, and named \"Next generation sequencing\".     Interpretation:  We discussed several different interpretations of this negative test result.    One explanation may be that there is a different gene or combination of genes and environment that are associated with the cancers in this family.  It is possible that her mother or other close relative with cancer did have a mutation in one of these genes and she did not inherit it.  There is also a small possibility that there is a mutation in one of these genes, and the testing laboratory could not find it with their current testing methods.   "     Screening:  Based on this negative test result, it is important for Nia and her relatives to refer back to the family history for appropriate cancer screening.    Nia s close female relatives remain at increased risk for breast cancer given their family history. Breast cancer screening is generally recommended to begin approximately 10 years younger than the earliest age of breast cancer diagnosis in the family, or at age 40, whichever comes first. In this family, screening may begin at age 40. Breast screening options should be discussed with an individual's primary care provider and a genetic counselor, to determine at what age to begin screening, what screening is appropriate, and if additional screening (such as breast MRI) is necessary based on personal/family history factors.   Other population cancer screening options, such as those recommended by the American Cancer Society and the National Comprehensive Cancer Network (NCCN), are also appropriate for Nia and her family. These screening recommendations may change if there are changes to Nia's personal and/or family history of cancer. Final screening recommendations should be made by each individual's primary care provider.      Summary:  We do not have an explanation for Nia's personal or family history of cancer. While no genetic changes were identified, Nia may still be at risk for certain cancers due to family history, environmental factors, or other genetic causes not identified by this test. Because of that, it is important that she continue with cancer screening based on her personal and family history as discussed above.    Genetic testing is rapidly advancing, and new cancer susceptibility genes will most likely be identified in the future. Therefore, I encouraged Nia to contact me annually or if there are changes in her personal or family history. This may change how we assess her cancer risk, screening, and  the testing we would offer.    Plan:  1.  A copy of the test results will be mailed to Nia.  2. She plans to follow-up with her other providers.  3. She should contact me regularly, or sooner if her family history changes.    If Nia has any further questions, I encouraged her to contact me via [employer].    Time spent on video: 7 minutes.    Haseeb Wiley MS, [facility] [place]  Licensed, Certified Genetic Counselor      Virtual Visit Details    Type of service:  Video Visit     Originating Location (pt. Location): Home  Distant Location (provider location):  Off-site  Platform used for Video Visit: [employer]      Again, thank you for allowing me to participate in the care of your patient.        Sincerely,        Haseeb Wiley, GC

## 2023-08-08 ENCOUNTER — APPOINTMENT (OUTPATIENT)
Dept: RADIATION ONCOLOGY | Facility: CLINIC | Age: 73
End: 2023-08-08
Attending: RADIOLOGY
Payer: MEDICARE

## 2023-08-08 PROCEDURE — 77387 GUIDANCE FOR RADJ TX DLVR: CPT | Performed by: STUDENT IN AN ORGANIZED HEALTH CARE EDUCATION/TRAINING PROGRAM

## 2023-08-08 PROCEDURE — 77412 RADIATION TX DELIVERY LVL 3: CPT | Performed by: STUDENT IN AN ORGANIZED HEALTH CARE EDUCATION/TRAINING PROGRAM

## 2023-08-09 ENCOUNTER — APPOINTMENT (OUTPATIENT)
Dept: RADIATION ONCOLOGY | Facility: CLINIC | Age: 73
End: 2023-08-09
Attending: RADIOLOGY
Payer: MEDICARE

## 2023-08-09 VITALS
WEIGHT: 160.4 LBS | HEART RATE: 83 BPM | RESPIRATION RATE: 16 BRPM | DIASTOLIC BLOOD PRESSURE: 95 MMHG | BODY MASS INDEX: 28.07 KG/M2 | TEMPERATURE: 97.8 F | SYSTOLIC BLOOD PRESSURE: 145 MMHG | OXYGEN SATURATION: 95 %

## 2023-08-09 DIAGNOSIS — C50.911 INVASIVE DUCTAL CARCINOMA OF BREAST, RIGHT (H): Primary | ICD-10-CM

## 2023-08-09 PROCEDURE — 77412 RADIATION TX DELIVERY LVL 3: CPT | Performed by: RADIOLOGY

## 2023-08-09 PROCEDURE — 77387 GUIDANCE FOR RADJ TX DLVR: CPT | Performed by: RADIOLOGY

## 2023-08-09 NOTE — PROGRESS NOTES
RADIATION ONCOLOGY WEEKLY TREATMENT VISIT NOTE      Assessment / Impression     Invasive ductal carcinoma of breast, right (H) [C50.911]     Tolerating radiation therapy well.  All questions and concerns addressed.    Plan:     Continue radiation treatment as prescribed.    Discussed with the patient about skin care.    Subjective:      HPI: Nia Prieto is a 73 year old female with  Invasive ductal carcinoma of breast, right (H) [C50.911]    The following portions of the patient's history were reviewed and updated as appropriate: allergies, current medications, past family history, past medical history, past social history, past surgical history and problem list.    Assessment                  Body Site:  Breast                           Site: Rt. Breast  Stereotactic Radiosurgery: No  Today's Dose: 3192  Total Dose for Breast: 5256  Today's Fraction/Total Fraction Breast:   Drainage: 0: Absent                                   Sexuality Alteration                    Emotional Alteration    Copin: Effective  Comfort Alteration   KPS: 90% Can perform normal activity, minor signs of disease  Fatigue (ONS scale): 2: Mild Fatigue  Pain Location: denies   Nutrition Alteration   Anorexia: 0: None  Nausea: 0: None  Vomitin: None  Weight: 72.8 kg (160 lb 6.4 oz)  Skin Alteration   Skin Sensation: 0: No problem  Skin Reaction: 1: Faint erythema or dry desquamation  AUA Assessment                                           Accompanied by       Objective:     Exam: mild Erythema.    Vitals:    23 1058   BP: (!) 145/95   Pulse: 83   Resp: 16   Temp: 97.8  F (36.6  C)   TempSrc: Oral   SpO2: 95%   Weight: 72.8 kg (160 lb 6.4 oz)       Wt Readings from Last 8 Encounters:   23 72.8 kg (160 lb 6.4 oz)   23 72 kg (158 lb 11.2 oz)   23 72.6 kg (160 lb 1.6 oz)   23 72.3 kg (159 lb 8 oz)   23 73 kg (160 lb 14.4 oz)   23 74.8 kg (164 lb 12.8 oz)   23 74.9 kg  (165 lb 3.2 oz)   06/08/23 74.8 kg (165 lb)       General: Alert and oriented, in no acute distress  Nia has mild Erythema.  Aria chart and setup information reviewed    Tati Wright MD

## 2023-08-09 NOTE — LETTER
2023         RE: Nia Prieto  [address] [address]        Dear Colleague,    Thank you for referring your patient, Nia Prieto, to the [facility] RADIATION ONCOLOGY [place]. Please see a copy of my visit note below.    RADIATION ONCOLOGY WEEKLY TREATMENT VISIT NOTE      Assessment / Impression     Invasive ductal carcinoma of breast, right (H) [C50.911]     Tolerating radiation therapy well.  All questions and concerns addressed.    Plan:     Continue radiation treatment as prescribed.    Discussed with the patient about skin care.    Subjective:      HPI: Nia Prieto is a 73 year old female with  Invasive ductal carcinoma of breast, right (H) [C50.911]    The following portions of the patient's history were reviewed and updated as appropriate: allergies, current medications, past family history, past medical history, past social history, past surgical history and problem list.    Assessment                  Body Site:  Breast                           Site: Rt. Breast  Stereotactic Radiosurgery: No  Today's Dose: 3192  Total Dose for Breast: 5256  Today's Fraction/Total Fraction Breast:   Drainage: 0: Absent                                   Sexuality Alteration                    Emotional Alteration    Copin: Effective  Comfort Alteration   KPS: 90% Can perform normal activity, minor signs of disease  Fatigue (ONS scale): 2: Mild Fatigue  Pain Location: denies   Nutrition Alteration   Anorexia: 0: None  Nausea: 0: None  Vomitin: None  Weight: 72.8 kg (160 lb 6.4 oz)  Skin Alteration   Skin Sensation: 0: No problem  Skin Reaction: 1: Faint erythema or dry desquamation  AUA Assessment                                           Accompanied by       Objective:     Exam: mild Erythema.    Vitals:    23 1058   BP: (!) 145/95   Pulse: 83   Resp: 16   Temp: 97.8  F (36.6  C)   TempSrc: Oral   SpO2: 95%   Weight: 72.8 kg (160 lb 6.4 oz)        Wt Readings from Last 8 Encounters:   08/09/23 72.8 kg (160 lb 6.4 oz)   08/02/23 72 kg (158 lb 11.2 oz)   07/26/23 72.6 kg (160 lb 1.6 oz)   07/12/23 72.3 kg (159 lb 8 oz)   07/06/23 73 kg (160 lb 14.4 oz)   06/27/23 74.8 kg (164 lb 12.8 oz)   06/22/23 74.9 kg (165 lb 3.2 oz)   06/08/23 74.8 kg (165 lb)       General: Alert and oriented, in no acute distress  Nia has mild Erythema.  Aria chart and setup information reviewed    Tati Wright MD      Again, thank you for allowing me to participate in the care of your patient.        Sincerely,        Tati Wright MD

## 2023-08-10 ENCOUNTER — APPOINTMENT (OUTPATIENT)
Dept: RADIATION ONCOLOGY | Facility: CLINIC | Age: 73
End: 2023-08-10
Attending: RADIOLOGY
Payer: MEDICARE

## 2023-08-10 PROCEDURE — 77387 GUIDANCE FOR RADJ TX DLVR: CPT | Performed by: STUDENT IN AN ORGANIZED HEALTH CARE EDUCATION/TRAINING PROGRAM

## 2023-08-10 PROCEDURE — 77412 RADIATION TX DELIVERY LVL 3: CPT | Performed by: STUDENT IN AN ORGANIZED HEALTH CARE EDUCATION/TRAINING PROGRAM

## 2023-08-11 ENCOUNTER — APPOINTMENT (OUTPATIENT)
Dept: RADIATION ONCOLOGY | Facility: CLINIC | Age: 73
End: 2023-08-11
Attending: RADIOLOGY
Payer: MEDICARE

## 2023-08-11 PROCEDURE — 77387 GUIDANCE FOR RADJ TX DLVR: CPT | Performed by: STUDENT IN AN ORGANIZED HEALTH CARE EDUCATION/TRAINING PROGRAM

## 2023-08-11 PROCEDURE — 77412 RADIATION TX DELIVERY LVL 3: CPT | Performed by: STUDENT IN AN ORGANIZED HEALTH CARE EDUCATION/TRAINING PROGRAM

## 2023-08-14 ENCOUNTER — APPOINTMENT (OUTPATIENT)
Dept: RADIATION ONCOLOGY | Facility: CLINIC | Age: 73
End: 2023-08-14
Attending: RADIOLOGY
Payer: MEDICARE

## 2023-08-14 PROCEDURE — 77387 GUIDANCE FOR RADJ TX DLVR: CPT | Performed by: RADIOLOGY

## 2023-08-14 PROCEDURE — 99207 PR NO CHARGE LOS: CPT | Performed by: RADIOLOGY

## 2023-08-14 PROCEDURE — 77412 RADIATION TX DELIVERY LVL 3: CPT | Performed by: RADIOLOGY

## 2023-08-14 PROCEDURE — 77336 RADIATION PHYSICS CONSULT: CPT | Performed by: RADIOLOGY

## 2023-08-14 PROCEDURE — 77427 RADIATION TX MANAGEMENT X5: CPT | Performed by: RADIOLOGY

## 2023-08-15 ENCOUNTER — APPOINTMENT (OUTPATIENT)
Dept: RADIATION ONCOLOGY | Facility: CLINIC | Age: 73
End: 2023-08-15
Attending: RADIOLOGY
Payer: MEDICARE

## 2023-08-15 PROCEDURE — 77412 RADIATION TX DELIVERY LVL 3: CPT | Performed by: STUDENT IN AN ORGANIZED HEALTH CARE EDUCATION/TRAINING PROGRAM

## 2023-08-15 PROCEDURE — 77387 GUIDANCE FOR RADJ TX DLVR: CPT | Performed by: STUDENT IN AN ORGANIZED HEALTH CARE EDUCATION/TRAINING PROGRAM

## 2023-08-16 ENCOUNTER — APPOINTMENT (OUTPATIENT)
Dept: RADIATION ONCOLOGY | Facility: CLINIC | Age: 73
End: 2023-08-16
Attending: RADIOLOGY
Payer: MEDICARE

## 2023-08-16 VITALS
SYSTOLIC BLOOD PRESSURE: 177 MMHG | BODY MASS INDEX: 27.95 KG/M2 | RESPIRATION RATE: 16 BRPM | TEMPERATURE: 98 F | HEART RATE: 76 BPM | OXYGEN SATURATION: 98 % | WEIGHT: 159.7 LBS | DIASTOLIC BLOOD PRESSURE: 94 MMHG

## 2023-08-16 DIAGNOSIS — C50.911 INVASIVE DUCTAL CARCINOMA OF BREAST, RIGHT (H): Primary | ICD-10-CM

## 2023-08-16 PROCEDURE — 77412 RADIATION TX DELIVERY LVL 3: CPT | Performed by: STUDENT IN AN ORGANIZED HEALTH CARE EDUCATION/TRAINING PROGRAM

## 2023-08-16 PROCEDURE — 77280 THER RAD SIMULAJ FIELD SMPL: CPT | Performed by: STUDENT IN AN ORGANIZED HEALTH CARE EDUCATION/TRAINING PROGRAM

## 2023-08-16 PROCEDURE — 77280 THER RAD SIMULAJ FIELD SMPL: CPT | Mod: 26 | Performed by: STUDENT IN AN ORGANIZED HEALTH CARE EDUCATION/TRAINING PROGRAM

## 2023-08-16 NOTE — LETTER
2023         RE: Nia Prieto  [address] [address]        Dear Colleague,    Thank you for referring your patient, Nia Prieto, to the [facility] RADIATION ONCOLOGY [place]. Please see a copy of my visit note below.    RADIATION ONCOLOGY WEEKLY TREATMENT VISIT NOTE      Assessment / Impression     Invasive ductal carcinoma of breast, right (H) [C50.911]     Cancer Staging   Infiltrating ductal carcinoma of right breast (H)  Staging form: Breast, AJCC 8th Edition  - Pathologic stage from 2023: Stage IA (pT1b, pN0(sn), cM0, G2, ER+, NH+, HER2-) - Signed by Lennie Herrera MD on 7/10/2023       Tolerating radiation therapy well.  All questions and concerns addressed.  Grade 1 radiation dermatitis    Plan:     Continue radiation treatment as prescribed.  Radiation:   Site: Rt. Breast  Stereotactic Radiosurgery: No  Today's Dose: 4506  Total Dose for Breast: 5256  Today's Fraction/Total Fraction Breast:     Continue current skin care    Subjective:      HPI: Nia Prieto is a 73 year old female with  Invasive ductal carcinoma of breast, right (H) [C50.911]    She is tolerating radiation therapy well with no significant increase in skin irritation.  No swelling of upper extremity or chest.  No significant fatigue.  No other concerns today.    The following portions of the patient's history were reviewed and updated as appropriate: allergies, current medications, past family history, past medical history, past social history, past surgical history and problem list.    Assessment                  Body Site:  Breast                           Site: Rt. Breast  Stereotactic Radiosurgery: No  Today's Dose: 4506  Total Dose for Breast: 5256  Today's Fraction/Total Fraction Breast:   Drainage: 0: Absent                                   Sexuality Alteration                    Emotional Alteration    Copin: Effective  Comfort Alteration   KPS:  90% Can perform normal activity, minor signs of disease  Fatigue (ONS scale): 2: Mild Fatigue  Pain Location: right breast tenderness  Pain Intensity. Rate degree of pain ranging from 0 (no pain) to 10 (severe pain): 0-1  Pain Description: Dull intermittent - Dull type of ache which is intermittent  Pain Intervention: 1: Over the counter medications  Effectiveness of pain intervention: 4: Pain relieved 100%   Nutrition Alteration   Anorexia: 0: None  Nausea: 0: None  Vomitin: None  Weight: 72.4 kg (159 lb 11.2 oz)  Skin Alteration   Skin Sensation: 0: No problem  Skin Reaction: 1: Faint erythema or dry desquamation  AUA Assessment                                           Accompanied by       Objective:     Exam:     Vitals:    23 1116   BP: (!) 177/94   Pulse: 76   Resp: 16   Temp: 98  F (36.7  C)   TempSrc: Oral   SpO2: 98%   Weight: 72.4 kg (159 lb 11.2 oz)       Wt Readings from Last 8 Encounters:   23 72.4 kg (159 lb 11.2 oz)   23 72.8 kg (160 lb 6.4 oz)   23 72 kg (158 lb 11.2 oz)   23 72.6 kg (160 lb 1.6 oz)   23 72.3 kg (159 lb 8 oz)   23 73 kg (160 lb 14.4 oz)   23 74.8 kg (164 lb 12.8 oz)   23 74.9 kg (165 lb 3.2 oz)       General: Alert and oriented, in no acute distress  Nia has no Erythema.    Treatment Summary to Date    Aria chart and setup information reviewed    Krystal Tran MD      Again, thank you for allowing me to participate in the care of your patient.        Sincerely,        Krystal Tran MD

## 2023-08-16 NOTE — PROGRESS NOTES
RADIATION ONCOLOGY WEEKLY TREATMENT VISIT NOTE      Assessment / Impression     Invasive ductal carcinoma of breast, right (H) [C50.911]     Cancer Staging   Infiltrating ductal carcinoma of right breast (H)  Staging form: Breast, AJCC 8th Edition  - Pathologic stage from 2023: Stage IA (pT1b, pN0(sn), cM0, G2, ER+, DC+, HER2-) - Signed by Lennie Herrera MD on 7/10/2023       Tolerating radiation therapy well.  All questions and concerns addressed.  Grade 1 radiation dermatitis    Plan:     Continue radiation treatment as prescribed.  Radiation:   Site: Rt. Breast  Stereotactic Radiosurgery: No  Today's Dose: 4506  Total Dose for Breast: 5256  Today's Fraction/Total Fraction Breast:     Continue current skin care    Subjective:      HPI: Nia Prieto is a 73 year old female with  Invasive ductal carcinoma of breast, right (H) [C50.911]    She is tolerating radiation therapy well with no significant increase in skin irritation.  No swelling of upper extremity or chest.  No significant fatigue.  No other concerns today.    The following portions of the patient's history were reviewed and updated as appropriate: allergies, current medications, past family history, past medical history, past social history, past surgical history and problem list.    Assessment                  Body Site:  Breast                           Site: Rt. Breast  Stereotactic Radiosurgery: No  Today's Dose: 4506  Total Dose for Breast: 5256  Today's Fraction/Total Fraction Breast:   Drainage: 0: Absent                                   Sexuality Alteration                    Emotional Alteration    Copin: Effective  Comfort Alteration   KPS: 90% Can perform normal activity, minor signs of disease  Fatigue (ONS scale): 2: Mild Fatigue  Pain Location: right breast tenderness  Pain Intensity. Rate degree of pain ranging from 0 (no pain) to 10 (severe pain): 0-1  Pain Description: Dull intermittent - Dull type of  ache which is intermittent  Pain Intervention: 1: Over the counter medications  Effectiveness of pain intervention: 4: Pain relieved 100%   Nutrition Alteration   Anorexia: 0: None  Nausea: 0: None  Vomitin: None  Weight: 72.4 kg (159 lb 11.2 oz)  Skin Alteration   Skin Sensation: 0: No problem  Skin Reaction: 1: Faint erythema or dry desquamation  AUA Assessment                                           Accompanied by       Objective:     Exam:     Vitals:    23 1116   BP: (!) 177/94   Pulse: 76   Resp: 16   Temp: 98  F (36.7  C)   TempSrc: Oral   SpO2: 98%   Weight: 72.4 kg (159 lb 11.2 oz)       Wt Readings from Last 8 Encounters:   23 72.4 kg (159 lb 11.2 oz)   23 72.8 kg (160 lb 6.4 oz)   23 72 kg (158 lb 11.2 oz)   23 72.6 kg (160 lb 1.6 oz)   23 72.3 kg (159 lb 8 oz)   23 73 kg (160 lb 14.4 oz)   23 74.8 kg (164 lb 12.8 oz)   23 74.9 kg (165 lb 3.2 oz)       General: Alert and oriented, in no acute distress  Nia has no Erythema.    Treatment Summary to Date    Aria chart and setup information reviewed    Krystal Tran MD

## 2023-08-17 ENCOUNTER — APPOINTMENT (OUTPATIENT)
Dept: RADIATION ONCOLOGY | Facility: CLINIC | Age: 73
End: 2023-08-17
Attending: RADIOLOGY
Payer: MEDICARE

## 2023-08-17 PROCEDURE — 77387 GUIDANCE FOR RADJ TX DLVR: CPT | Mod: 26 | Performed by: STUDENT IN AN ORGANIZED HEALTH CARE EDUCATION/TRAINING PROGRAM

## 2023-08-17 PROCEDURE — 77412 RADIATION TX DELIVERY LVL 3: CPT | Performed by: STUDENT IN AN ORGANIZED HEALTH CARE EDUCATION/TRAINING PROGRAM

## 2023-08-17 PROCEDURE — 77387 GUIDANCE FOR RADJ TX DLVR: CPT | Performed by: STUDENT IN AN ORGANIZED HEALTH CARE EDUCATION/TRAINING PROGRAM

## 2023-08-18 ENCOUNTER — APPOINTMENT (OUTPATIENT)
Dept: RADIATION ONCOLOGY | Facility: CLINIC | Age: 73
End: 2023-08-18
Attending: RADIOLOGY
Payer: MEDICARE

## 2023-08-18 PROCEDURE — 77387 GUIDANCE FOR RADJ TX DLVR: CPT | Performed by: STUDENT IN AN ORGANIZED HEALTH CARE EDUCATION/TRAINING PROGRAM

## 2023-08-18 PROCEDURE — 77387 GUIDANCE FOR RADJ TX DLVR: CPT | Mod: 26 | Performed by: STUDENT IN AN ORGANIZED HEALTH CARE EDUCATION/TRAINING PROGRAM

## 2023-08-18 PROCEDURE — 77412 RADIATION TX DELIVERY LVL 3: CPT | Performed by: STUDENT IN AN ORGANIZED HEALTH CARE EDUCATION/TRAINING PROGRAM

## 2023-08-21 ENCOUNTER — APPOINTMENT (OUTPATIENT)
Dept: RADIATION ONCOLOGY | Facility: CLINIC | Age: 73
End: 2023-08-21
Attending: RADIOLOGY
Payer: MEDICARE

## 2023-08-21 PROCEDURE — 77387 GUIDANCE FOR RADJ TX DLVR: CPT | Mod: 26 | Performed by: RADIOLOGY

## 2023-08-21 PROCEDURE — 77427 RADIATION TX MANAGEMENT X5: CPT | Performed by: STUDENT IN AN ORGANIZED HEALTH CARE EDUCATION/TRAINING PROGRAM

## 2023-08-21 PROCEDURE — 77336 RADIATION PHYSICS CONSULT: CPT | Performed by: STUDENT IN AN ORGANIZED HEALTH CARE EDUCATION/TRAINING PROGRAM

## 2023-08-21 PROCEDURE — 77387 GUIDANCE FOR RADJ TX DLVR: CPT | Performed by: RADIOLOGY

## 2023-08-21 PROCEDURE — 77412 RADIATION TX DELIVERY LVL 3: CPT | Performed by: RADIOLOGY

## 2023-08-21 NOTE — PROGRESS NOTES
Radiation Treatment Summary          Patient: Nia Prieto            MRN: [id number]           : 1950        Care Provider: Tati Wright MD         Date of Service: Aug 21, 2023      Lillian Marie DO  [facility] [address] [address]            Dear Dr. Marie:     Your patient Mrs. Nia Prieto completed her radiation therapy on 2023. As you know Ms. Prieto is a 72 year old female with a diagnosis of right breast cancer, pathologic stage T1bN0(sn)M0, ER/AR positive, HER2 negative, status post lumpectomy and sentinel lymph node resection with negative margin and closest margin measured less than 1 mm anterior for invasive cancer and DCIS.  The patient received postop radiation therapy with a total dose of 5256 cGy in 20 treatments given from 2023-2023.  She tolerated radiation therapy well with expected acute side effect.  The patient is scheduled to return to radiation oncology in 4 weeks for routine post therapy office follow-up.    Again, thank you very much for the referral and allowing me to participate in the care of this patient.  If you have any questions or concerns about this patient, please do not hesitate to call.          Sincerely,      Tati Wright MD, PhD  Department of Radiation Oncology   [facility] Radiation Oncology  Tel: [phone]  Page: [phone]    [facility]  [address] [place] [address]     [address] [place] [address]    CC:  Patient Care Team:  Sammie Pérez MD as PCP - General (Family Practice)  Sammie Pérez MD as Assigned PCP  Lillian Marie DO as Assigned Surgical Provider  Sammie Pérez MD as Assigned Pain Medication Provider  Tati Wright MD as MD (Radiation Oncology)  Tati Wright MD as Assigned Cancer Care Provider

## 2023-08-21 NOTE — PROGRESS NOTES
Patient here ambulatory for final radiation therapy treatment to her breast cancer.  Patient having a follicular reaction and slight erythema to treatment area.  Reinforced skin cares.  Written discharge instructions reviewed and given to patient.  Follow-up with Dr. Wright in about 4 to 6 weeks.  Patient left ambulatory with appointments

## 2023-08-29 ENCOUNTER — TELEPHONE (OUTPATIENT)
Dept: RADIATION ONCOLOGY | Facility: CLINIC | Age: 73
End: 2023-08-29
Payer: MEDICARE

## 2023-08-29 NOTE — TELEPHONE ENCOUNTER
Pt called, no answer, LM that courtesy call after RT. Informed pt to call with any questions or concerns. Reminded of follow up.

## 2023-10-02 ENCOUNTER — PATIENT OUTREACH (OUTPATIENT)
Dept: ONCOLOGY | Facility: CLINIC | Age: 73
End: 2023-10-02
Payer: MEDICARE

## 2023-10-02 DIAGNOSIS — C50.911 INVASIVE DUCTAL CARCINOMA OF BREAST, STAGE 1, RIGHT (H): ICD-10-CM

## 2023-10-02 RX ORDER — ANASTROZOLE 1 MG/1
1 TABLET ORAL DAILY
Qty: 90 TABLET | Refills: 0 | Status: SHIPPED | OUTPATIENT
Start: 2023-10-02 | End: 2023-10-30

## 2023-10-02 RX ORDER — ANASTROZOLE 1 MG/1
1 TABLET ORAL DAILY
Qty: 30 TABLET | Refills: 1 | OUTPATIENT
Start: 2023-10-02

## 2023-10-02 NOTE — TELEPHONE ENCOUNTER
Patient started medication on 8/8/23.    Last Written Prescription Date:  6/22/23  Last Fill Quantity: 30,  # refills: 1   Last office visit provider:  6/22/23 with Dr. Herrera, follow up scheduled 10/17/23    Requested Prescriptions   Pending Prescriptions Disp Refills    anastrozole (ARIMIDEX) 1 MG tablet [Pharmacy Med Name: ANASTROZOLE 1 MG TABLET] 30 tablet 1     Sig: TAKE 1 TABLET BY MOUTH EVERY DAY       There is no refill protocol information for this order          Gertrude Mejia RN 10/02/23 2:57 PM

## 2023-10-02 NOTE — PROGRESS NOTES
"[facility]: Cancer Care Short Note                                    Discussion with Patient:                                                      Called patient to see how she was tolerating anastrozole. She was in the grocery store and asked for a call back in 20 minutes.    Called patient. See assessment below.    Assessment:                                                      Patient reports started anastrozole on 8/22/23. She said she is \"doing fine. No complaints\". She said \"maybe every 5 days\" notices some muscles aches, joint pain \"but nothing that lasts. I am 73 years old.\"  She also said \"every once in awhile has a teeny hot flash\".  Nothing she said she can't tolerate.    Patient reports she just got back from a wonderful road trip. They went to [place], [place], [place], [place].    Intervention/Education provided during outreach:                                                       Provided patient with contact information for Cancer Care Triage and instructed when and how to call during business hours and after hours.  Patient verbalized understanding.    Patient to follow up as scheduled at next appt:  10/17/23.    Signature:  Cindy Kent RN    "

## 2023-10-11 ENCOUNTER — OFFICE VISIT (OUTPATIENT)
Dept: RADIATION ONCOLOGY | Facility: CLINIC | Age: 73
End: 2023-10-11
Attending: RADIOLOGY
Payer: MEDICARE

## 2023-10-11 VITALS
TEMPERATURE: 97.7 F | OXYGEN SATURATION: 97 % | BODY MASS INDEX: 28.03 KG/M2 | HEART RATE: 81 BPM | RESPIRATION RATE: 16 BRPM | SYSTOLIC BLOOD PRESSURE: 164 MMHG | WEIGHT: 160.2 LBS | DIASTOLIC BLOOD PRESSURE: 89 MMHG

## 2023-10-11 DIAGNOSIS — C50.911 INVASIVE DUCTAL CARCINOMA OF BREAST, RIGHT (H): Primary | ICD-10-CM

## 2023-10-11 PROCEDURE — G0463 HOSPITAL OUTPT CLINIC VISIT: HCPCS | Performed by: RADIOLOGY

## 2023-10-11 ASSESSMENT — PAIN SCALES - GENERAL: PAINLEVEL: NO PAIN (0)

## 2023-10-11 NOTE — LETTER
10/11/2023         RE: Nia Prieto  [address] [facility] [address]        Dear Colleague,    Thank you for referring your patient, Nia Prieto, to the [facility] RADIATION ONCOLOGY [place]. Please see a copy of my visit note below.    [facility] Radiation Oncology Follow Up     Patient: Nia Prieto  MRN: [id number]  Date of Service: 10/11/2023       DISEASE TREATED:  Right breast cancer, pathologic stage T1bN0(sn)M0, ER/AZ positive, HER2 negative, status post lumpectomy and sentinel lymph node resection with negative margin and closest margin measured less than 1 mm anterior for invasive cancer and DCIS.        TYPE OF RADIATION THERAPY ADMINISTERED:  5256 cGy in 20 treatments given from 7/25/2023-8/21/2023.      INTERVAL SINCE COMPLETION OF RADIATION THERAPY: 1 month.      SUBJECTIVE:  Ms. Prieto is a 73 year old female who has been in her usual state of health until recently.  The patient presented with abnormal finding by routine screening mammogram for which she was a seeking further evaluation.  The mammogram followed by ultrasound reviewed a 0.6 x 0.5 x 0.8 cm irregular hypoechoic mass at 11 o'clock position 4 cm from nipple of right breast suspicious for malignancy.  Patient underwent ultrasound-guided needle biopsy on 4/20/2023.  The pathology reviewed microinvasive ductal carcinoma less than 1 mm with DCIS.  ER/AZ's are positive and HER2 negative.  Patient underwent lumpectomy and sentinel lymph node resection on 6/8/2023.  The pathology reviewed 5.3 x 5 mm invasive ductal carcinoma, [place] grade 2 with associated DCIS.  Margins are negative with closest margin measured less than 1 mm anterior for both invasive cancer and DCIS.  2 removed sentinel lymph node showed no evidence of metastatic disease.  The patient has been recovering well since the surgery.  She saw Dr. Herrera, medical oncology on 6/22/2023 and adjuvant hormone therapy was recommended. The  patient received postop radiation therapy with a total dose of 5256 cGy in 20 treatments given from 7/25/2023-8/21/2023.  She tolerated radiation therapy well with expected acute side effect.     The patient has been recovering well since completion of the therapy.  She denies any pain and the discomfort related to therapy at the time of evaluation.  She is here for routine post therapy office follow-up.    She started her hormone therapy few weeks ago and has been tolerated well.    Medications were reviewed and are up to date on EPIC.    The following portions of the patient's history were reviewed and updated as appropriate: allergies, current medications, past family history, past medical history, past social history, past surgical history and problem list.    Review of Systems:      General  Constitutional  Constitutional (WDL): Exceptions to WDL  Fatigue: Fatigue relieved by rest  EENT  Eye Disorders  Eye Disorder (WDL): Exceptions to WDL (wears reading glasses)  Blurred Vision: Intervention not indicated (occasional floater)  Ear Disorders  Ear Disorder (WDL): Exceptions to WDL (occasional drainage in ears using zyrtec)  Respiratory  Respiratory  Respiratory (WDL): All respiratory elements are within defined limits  Cardiovascular  Cardiovascular  Cardiovascular (WDL): All cardiovascular elements are within defined limits (no pacemaker)  Gastrointestinal  Gastrointestinal  Gastrointestinal (WDL): All gastrointestinal elements are within defined limits  Musculoskeletal  Musculoskeletal and Connective Tissue Disorders  Musculoskeletal & Connective (WDL): All musculoskeletal & connective elements are within defined limits  Integumentary  Integumentary  Integumentary (WDL): Exceptions to WDL (healing right breast radiation dermatitis)  Neurological  Neurosensory  Neurosensory (WDL): All neurosensory elements are within defined limits  Genitourinary/Reproductive  Genitourinary  Genitourinary (WDL): All  genitourinary elements are within defined limits  Lymphatic  Lymph System Disorders  Lymph (WDL): All lymph elements are within defined limits  Pain  Pain Score: No Pain (0)  AUA Assessment                                                              Accompanied by  Accompanied By: self only    Objective:     PHYSICAL EXAMINATION:    BP (!) 164/89 (BP Location: Left arm, Patient Position: Sitting, Cuff Size: Adult Regular)   Pulse 81   Temp 97.7  F (36.5  C) (Oral)   Resp 16   Wt 72.7 kg (160 lb 3.2 oz)   SpO2 97%   BMI 28.03 kg/m      Gen: Alert, in NAD  Eyes: PERRL, EOMI, sclera anicteric  Chest: The breasts and nipples are symmetrical bilaterally.  There is no evidence of nipple discharge.  There is no palpable lump or mass bilaterally in the breast, axillary, and supraclavicular region.  There is well-healed surgical scar in the right breast consistent with a recent history of surgery.  Pulm: No wheezing, stridor or respiratory distress  CV: Well-perfused, no cyanosis, no pedal edema  Abdominal: BS+, soft, nontender, nondistended, no hepatomegaly  Back: No step-offs or pain to palpation along the thoracolumbar spine  Rectal: Deferred  : Deferred  Musculoskeletal: Normal muscle bulk and tone  Skin: Normal color and turgor  Neurologic: A/Ox3, CN II-XII intact, normal gait and station  Psychiatric: Appropriate mood and affect      Impression     Mrs. Prieto is a 72 year old female with a diagnosis of right breast cancer, pathologic stage T1bN0(sn)M0, ER/PA positive, HER2 negative, status post lumpectomy and sentinel lymph node resection with negative margin and closest margin measured less than 1 mm anterior for invasive cancer and DCIS.  Patient is a status post postop radiation therapy completed 1 month ago.  She has been recovering well with no ongoing issues.    Assessment & Plan:   1.  The patient has been recovering well since radiation therapy.  She will continue her long-term follow-up and ongoing  "care for her breast cancer with Dr. Herrera, medical oncology and Dr. Lillian Guadalupe, breast surgeon as planned.    2.  Follow-up with radiation oncology as needed.    Face to face time  15 minutes with > 80% spent on consultation, education and coordination of care.      Tati Wright MD  Department of Radiation Oncology   [facility]  Tel: [phone]  Page: [phone]    [facility]  [address]  [place] [address]     [facility]   [address] [facility] [place] [address]    CC:  Patient Care Team:  Sammie Pérez MD as PCP - General (Family Practice)  Sammie Pérez MD as Assigned PCP  Lillian Marie DO as Assigned Surgical Provider  Tati Wright MD as MD (Radiation Oncology)  Tati Wright MD as Assigned Cancer Care Provider      Oncology Rooming Note    October 11, 2023 1:44 PM   Nia Prieto is a 73 year old female who presents for:    Chief Complaint   Patient presents with     Oncology Clinic Visit     Follow up with Dr. Wright     Initial Vitals: BP (!) 164/89 (BP Location: Left arm, Patient Position: Sitting, Cuff Size: Adult Regular)   Pulse 81   Temp 97.7  F (36.5  C) (Oral)   Resp 16   Wt 72.7 kg (160 lb 3.2 oz)   SpO2 97%   BMI 28.03 kg/m   Estimated body mass index is 28.03 kg/m  as calculated from the following:    Height as of 7/12/23: 1.61 m (5' 3.39\").    Weight as of this encounter: 72.7 kg (160 lb 3.2 oz). Body surface area is 1.8 meters squared.  No Pain (0) Comment: Data Unavailable   No LMP recorded. Patient is postmenopausal.  Allergies reviewed: Yes  Medications reviewed: Yes    Medications: Medication refills not needed today.  Pharmacy name entered into [facility]: [id number] [place] [address]    Clinical concerns: Patient here ambulatory for follow-up status post radiation for her breast cancer.  Patient denies any side effects.  Seen by Dr. Wright.  Plan return to clinic for follow-up as directed by provider.   Dr. Wright was " notified.      Apoorva Subramanian RN                Again, thank you for allowing me to participate in the care of your patient.        Sincerely,        Tati Wright MD

## 2023-10-11 NOTE — PROGRESS NOTES
"Oncology Rooming Note    October 11, 2023 1:44 PM   Nia Prieto is a 73 year old female who presents for:    Chief Complaint   Patient presents with    Oncology Clinic Visit     Follow up with Dr. Wright     Initial Vitals: BP (!) 164/89 (BP Location: Left arm, Patient Position: Sitting, Cuff Size: Adult Regular)   Pulse 81   Temp 97.7  F (36.5  C) (Oral)   Resp 16   Wt 72.7 kg (160 lb 3.2 oz)   SpO2 97%   BMI 28.03 kg/m   Estimated body mass index is 28.03 kg/m  as calculated from the following:    Height as of 7/12/23: 1.61 m (5' 3.39\").    Weight as of this encounter: 72.7 kg (160 lb 3.2 oz). Body surface area is 1.8 meters squared.  No Pain (0) Comment: Data Unavailable   No LMP recorded. Patient is postmenopausal.  Allergies reviewed: Yes  Medications reviewed: Yes    Medications: Medication refills not needed today.  Pharmacy name entered into [facility]: [id number] [place] [address]    Clinical concerns: Patient here ambulatory for follow-up status post radiation for her breast cancer.  Patient denies any side effects.  Seen by Dr. Wright.  Plan return to clinic for follow-up as directed by provider.   Dr. Wright was notified.      Apoorva Subramanian RN              "

## 2023-10-11 NOTE — PROGRESS NOTES
[facility] Radiation Oncology Follow Up     Patient: Nia Prieto  MRN: [id number]  Date of Service: 10/11/2023       DISEASE TREATED:  Right breast cancer, pathologic stage T1bN0(sn)M0, ER/MT positive, HER2 negative, status post lumpectomy and sentinel lymph node resection with negative margin and closest margin measured less than 1 mm anterior for invasive cancer and DCIS.        TYPE OF RADIATION THERAPY ADMINISTERED:  5256 cGy in 20 treatments given from 7/25/2023-8/21/2023.      INTERVAL SINCE COMPLETION OF RADIATION THERAPY: 1 month.      SUBJECTIVE:  Ms. Prieto is a 73 year old female who has been in her usual state of health until recently.  The patient presented with abnormal finding by routine screening mammogram for which she was a seeking further evaluation.  The mammogram followed by ultrasound reviewed a 0.6 x 0.5 x 0.8 cm irregular hypoechoic mass at 11 o'clock position 4 cm from nipple of right breast suspicious for malignancy.  Patient underwent ultrasound-guided needle biopsy on 4/20/2023.  The pathology reviewed microinvasive ductal carcinoma less than 1 mm with DCIS.  ER/MT's are positive and HER2 negative.  Patient underwent lumpectomy and sentinel lymph node resection on 6/8/2023.  The pathology reviewed 5.3 x 5 mm invasive ductal carcinoma, [place] grade 2 with associated DCIS.  Margins are negative with closest margin measured less than 1 mm anterior for both invasive cancer and DCIS.  2 removed sentinel lymph node showed no evidence of metastatic disease.  The patient has been recovering well since the surgery.  She saw Dr. Herrera, medical oncology on 6/22/2023 and adjuvant hormone therapy was recommended. The patient received postop radiation therapy with a total dose of 5256 cGy in 20 treatments given from 7/25/2023-8/21/2023.  She tolerated radiation therapy well with expected acute side effect.     The patient has been recovering well since completion of the therapy.  She  denies any pain and the discomfort related to therapy at the time of evaluation.  She is here for routine post therapy office follow-up.    She started her hormone therapy few weeks ago and has been tolerated well.    Medications were reviewed and are up to date on EPIC.    The following portions of the patient's history were reviewed and updated as appropriate: allergies, current medications, past family history, past medical history, past social history, past surgical history and problem list.    Review of Systems:      General  Constitutional  Constitutional (WDL): Exceptions to WDL  Fatigue: Fatigue relieved by rest  EENT  Eye Disorders  Eye Disorder (WDL): Exceptions to WDL (wears reading glasses)  Blurred Vision: Intervention not indicated (occasional floater)  Ear Disorders  Ear Disorder (WDL): Exceptions to WDL (occasional drainage in ears using zyrtec)  Respiratory  Respiratory  Respiratory (WDL): All respiratory elements are within defined limits  Cardiovascular  Cardiovascular  Cardiovascular (WDL): All cardiovascular elements are within defined limits (no pacemaker)  Gastrointestinal  Gastrointestinal  Gastrointestinal (WDL): All gastrointestinal elements are within defined limits  Musculoskeletal  Musculoskeletal and Connective Tissue Disorders  Musculoskeletal & Connective (WDL): All musculoskeletal & connective elements are within defined limits  Integumentary  Integumentary  Integumentary (WDL): Exceptions to WDL (healing right breast radiation dermatitis)  Neurological  Neurosensory  Neurosensory (WDL): All neurosensory elements are within defined limits  Genitourinary/Reproductive  Genitourinary  Genitourinary (WDL): All genitourinary elements are within defined limits  Lymphatic  Lymph System Disorders  Lymph (WDL): All lymph elements are within defined limits  Pain  Pain Score: No Pain (0)  AUA Assessment                                                              Accompanied by  Accompanied By:  self only    Objective:     PHYSICAL EXAMINATION:    BP (!) 164/89 (BP Location: Left arm, Patient Position: Sitting, Cuff Size: Adult Regular)   Pulse 81   Temp 97.7  F (36.5  C) (Oral)   Resp 16   Wt 72.7 kg (160 lb 3.2 oz)   SpO2 97%   BMI 28.03 kg/m      Gen: Alert, in NAD  Eyes: PERRL, EOMI, sclera anicteric  Chest: The breasts and nipples are symmetrical bilaterally.  There is no evidence of nipple discharge.  There is no palpable lump or mass bilaterally in the breast, axillary, and supraclavicular region.  There is well-healed surgical scar in the right breast consistent with a recent history of surgery.  Pulm: No wheezing, stridor or respiratory distress  CV: Well-perfused, no cyanosis, no pedal edema  Abdominal: BS+, soft, nontender, nondistended, no hepatomegaly  Back: No step-offs or pain to palpation along the thoracolumbar spine  Rectal: Deferred  : Deferred  Musculoskeletal: Normal muscle bulk and tone  Skin: Normal color and turgor  Neurologic: A/Ox3, CN II-XII intact, normal gait and station  Psychiatric: Appropriate mood and affect      Impression     Mrs. Prieto is a 72 year old female with a diagnosis of right breast cancer, pathologic stage T1bN0(sn)M0, ER/RI positive, HER2 negative, status post lumpectomy and sentinel lymph node resection with negative margin and closest margin measured less than 1 mm anterior for invasive cancer and DCIS.  Patient is a status post postop radiation therapy completed 1 month ago.  She has been recovering well with no ongoing issues.    Assessment & Plan:   1.  The patient has been recovering well since radiation therapy.  She will continue her long-term follow-up and ongoing care for her breast cancer with Dr. Herrera, medical oncology and Dr. Lillian Guadalupe, breast surgeon as planned.    2.  Follow-up with radiation oncology as needed.    Face to face time  15 minutes with > 80% spent on consultation, education and coordination of care.      Tati Wright,  MD  Department of Radiation Oncology   [facility]  Tel: [phone]  Page: [phone]    [facility]  [address] [place] [address]     [address] [place] [address]    CC:  Patient Care Team:  Sammie Pérez MD as PCP - General (Family Practice)  Sammie Pérez MD as Assigned PCP  Lillian Marie DO as Assigned Surgical Provider  Tati Wright MD as MD (Radiation Oncology)  Tati Wright MD as Assigned Cancer Care Provider

## 2023-10-17 ENCOUNTER — ONCOLOGY VISIT (OUTPATIENT)
Dept: ONCOLOGY | Facility: CLINIC | Age: 73
End: 2023-10-17
Attending: INTERNAL MEDICINE
Payer: MEDICARE

## 2023-10-17 VITALS
BODY MASS INDEX: 27.31 KG/M2 | HEIGHT: 64 IN | WEIGHT: 160 LBS | SYSTOLIC BLOOD PRESSURE: 120 MMHG | HEART RATE: 80 BPM | RESPIRATION RATE: 18 BRPM | OXYGEN SATURATION: 98 % | DIASTOLIC BLOOD PRESSURE: 64 MMHG

## 2023-10-17 DIAGNOSIS — C50.911 INVASIVE DUCTAL CARCINOMA OF BREAST, STAGE 1, RIGHT (H): ICD-10-CM

## 2023-10-17 DIAGNOSIS — Z79.811 ENCOUNTER FOR MONITORING AROMATASE INHIBITOR THERAPY: ICD-10-CM

## 2023-10-17 DIAGNOSIS — Z51.81 ENCOUNTER FOR MONITORING AROMATASE INHIBITOR THERAPY: ICD-10-CM

## 2023-10-17 DIAGNOSIS — C50.911 INFILTRATING DUCTAL CARCINOMA OF RIGHT BREAST (H): Primary | ICD-10-CM

## 2023-10-17 PROCEDURE — 99214 OFFICE O/P EST MOD 30 MIN: CPT | Performed by: INTERNAL MEDICINE

## 2023-10-17 PROCEDURE — G0463 HOSPITAL OUTPT CLINIC VISIT: HCPCS | Performed by: INTERNAL MEDICINE

## 2023-10-17 ASSESSMENT — PAIN SCALES - GENERAL: PAINLEVEL: NO PAIN (0)

## 2023-10-17 NOTE — LETTER
"    10/17/2023         RE: Nia Prieto  [address] [facility] [place] [facility] [address]        Dear Colleague,    Thank you for referring your patient, Nia Prieto, to the [facility]. Please see a copy of my visit note below.    Oncology Rooming Note    October 17, 2023 1:08 PM   Nia Prieto is a 73 year old female who presents for:    Chief Complaint   Patient presents with     Oncology Clinic Visit       Infiltrating ductal carcinoma of right breast       Initial Vitals: /64   Pulse 80   Resp 18   Ht 1.619 m (5' 3.75\")   Wt 72.6 kg (160 lb)   SpO2 98%   BMI 27.68 kg/m   Estimated body mass index is 27.68 kg/m  as calculated from the following:    Height as of this encounter: 1.619 m (5' 3.75\").    Weight as of this encounter: 72.6 kg (160 lb). Body surface area is 1.81 meters squared.  No Pain (0) Comment: Data Unavailable   No LMP recorded. Patient is postmenopausal.  Allergies reviewed: Yes  Medications reviewed: Yes    Medications: Medication refills not needed today.  Pharmacy name entered into [facility]: [id number] [place] [facility] - [place], [place] - [address]    Clinical concerns:  4 month follow up      Lore Patton              [facility] Hematology and Oncology Progress Note    Patient: Nia Prieto  MRN: [id number]  Date of Service: Oct 17, 2023         Reason for Visit    Chief Complaint   Patient presents with     Oncology Clinic Visit       Infiltrating ductal carcinoma of right breast         Assessment and Plan     Cancer Staging   Infiltrating ductal carcinoma of right breast (H)  Staging form: Breast, AJCC 8th Edition  - Pathologic stage from 6/22/2023: Stage IA (pT1b, pN0(sn), cM0, G2, ER+, MS+, HER2-) - Signed by Lennie Herrera MD on 7/10/2023      ECOG Performance    0 - Independent     Pain  Pain Score: No Pain (0)    #.  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  Grade 2.  ER " positive, WV positive, HER2 negative.    #.  Low bone density    She has excellent tolerance to anastrozole.  She is comfortable continuing it. Refilled.    Her clinical exam today is unremarkable for breast cancer recurrence.  I recommended continue clinical surveillance with clinical exam in 6 months.      I recommended her to continue calcium/vitamin D and weightbearing exercises.  We will plan to obtain DEXA scan in [id number].    Encounter Diagnoses:    Problem List Items Addressed This Visit          Oncology Diagnoses    Infiltrating ductal carcinoma of right breast (H) - Primary       Other    Encounter for monitoring aromatase inhibitor therapy     Other Visit Diagnoses       Invasive ductal carcinoma of breast, stage 1, right (H)        Relevant Medications    anastrozole (ARIMIDEX) 1 MG tablet                 CC: Sammie Pérez MD   ______________________________________________________________________________  Diagnosis/ Treatment to date  6/2023- Invasive ductal carcinoma of the upper outer quadrant of the right breast.    Grade 2.   5.3 x 5 mm, Negative margins   There is associated DCIS with close margin.   pT1b (sn)N0   ER positive (%), WV positive (%), HER2 negative (1+ by IHC).      6/8/2023- right breast lumpectomy and right axillary sentinel lymph node biopsy (Dr. Marie)  7/25/2023-8/21/2023-5256 cGy in 20 treatments of adjuvant radiation  8/22/2023-initiated anastrozole    History of Present Illness    Ms. Nia Prieto presented today for follow-up after starting anastrozole.    She reported no intolerable side effects from anastrozole.  She takes it regularly.  She started right after finishing adjuvant radiation.    Review of systems  Apart from describing in HPI, the remainder of comprehensive ROS was negative.    Past History    Past Medical History:   Diagnosis Date     Diabetes (H)      Ductal carcinoma in situ (DCIS) of breast with microinvasive component (H)     right  "    Eczema      Hypertension      Seasonal allergic rhinitis        Past Surgical History:   Procedure Laterality Date     CATARACT EXTRACTION Bilateral      COLONOSCOPY       EYE SURGERY       LAPAROSCOPIC CHOLECYSTECTOMY N/A 7/5/2023    Procedure: CHOLECYSTECTOMY, LAPAROSCOPIC;  Surgeon: Giorgi Stephens MD;  Location: [facility] OR     LUMPECTOMY BREAST Right 6/8/2023    Procedure: BREAST TAG LOCALIZED LUMPECTOMY WITH SENTINEL LYMPH NODE BIOPSY;  Surgeon: Lillian Marie DO;  Location: [facility] OR       Physical Exam    /64   Pulse 80   Resp 18   Ht 1.619 m (5' 3.75\")   Wt 72.6 kg (160 lb)   SpO2 98%   BMI 27.68 kg/m      General: alert, awake, not in acute distress  HEENT: Head: Normal, normocephalic, atraumatic.  Eye: Normal external eye, conjunctiva, lids cornea, MOY.  Pharynx: Normal buccal mucosa. Normal pharynx.  Neck / Thyroid: Supple, no masses, nodes, nodules or enlargement.  Lymphatics: No abnormally enlarged lymph nodes.  Chest: Normal chest wall and respirations. Clear to auscultation.  Breasts: Unremarkable  Heart: S1 S2 RRR.   Abdomen: abdomen is soft without significant tenderness, masses, organomegaly or guarding  Extremities: normal strength, tone, and muscle mass  Skin: normal. no rash or abnormalities  CNS: non focal.    Lab Results    No results found for this or any previous visit (from the past 168 hour(s)).      Imaging    No results found.    30 minutes spent on the date of the encounter doing chart review, history and exam, documentation, communication of the treatment plan with the care team and further activities as noted above.    Signed by: Lennie Herrera MD        Again, thank you for allowing me to participate in the care of your patient.        Sincerely,        Lennie Herrera MD  "

## 2023-10-17 NOTE — PROGRESS NOTES
"Oncology Rooming Note    October 17, 2023 1:08 PM   Nia Prieto is a 73 year old female who presents for:    Chief Complaint   Patient presents with    Oncology Clinic Visit       Infiltrating ductal carcinoma of right breast       Initial Vitals: /64   Pulse 80   Resp 18   Ht 1.619 m (5' 3.75\")   Wt 72.6 kg (160 lb)   SpO2 98%   BMI 27.68 kg/m   Estimated body mass index is 27.68 kg/m  as calculated from the following:    Height as of this encounter: 1.619 m (5' 3.75\").    Weight as of this encounter: 72.6 kg (160 lb). Body surface area is 1.81 meters squared.  No Pain (0) Comment: Data Unavailable   No LMP recorded. Patient is postmenopausal.  Allergies reviewed: Yes  Medications reviewed: Yes    Medications: Medication refills not needed today.  Pharm[facility] entered into [id number] [place] [address]    Clinical concerns:  4 month follow up      Lore Patton            "

## 2023-10-30 PROBLEM — Z79.811 ENCOUNTER FOR MONITORING AROMATASE INHIBITOR THERAPY: Status: ACTIVE | Noted: 2023-10-30

## 2023-10-30 PROBLEM — Z51.81 ENCOUNTER FOR MONITORING AROMATASE INHIBITOR THERAPY: Status: ACTIVE | Noted: 2023-10-30

## 2023-10-30 RX ORDER — ANASTROZOLE 1 MG/1
1 TABLET ORAL DAILY
Qty: 90 TABLET | Refills: 1 | Status: SHIPPED | OUTPATIENT
Start: 2023-10-30 | End: 2024-04-30

## 2023-11-15 ENCOUNTER — ALLIED HEALTH/NURSE VISIT (OUTPATIENT)
Dept: FAMILY MEDICINE | Facility: CLINIC | Age: 73
End: 2023-11-15
Payer: MEDICARE

## 2023-11-15 DIAGNOSIS — Z23 ENCOUNTER FOR IMMUNIZATION: Primary | ICD-10-CM

## 2023-11-15 PROCEDURE — 91320 SARSCV2 VAC 30MCG TRS-SUC IM: CPT

## 2023-11-15 PROCEDURE — 99207 PR NO CHARGE NURSE ONLY: CPT

## 2023-11-15 PROCEDURE — 90480 ADMN SARSCOV2 VAC 1/ONLY CMP: CPT

## 2023-11-15 NOTE — PROGRESS NOTES
Prior to immunization administration, verified patients identity using patient s name and date of birth. Please see Immunization Activity for additional information.     Screening Questionnaire for Adult Immunization    Are you sick today?   No   Do you have allergies to medications, food, a vaccine component or latex?   No   Have you ever had a serious reaction after receiving a vaccination?   No   Do you have a long-term health problem with heart, lung, kidney, or metabolic disease (e.g., diabetes), asthma, a blood disorder, no spleen, complement component deficiency, a cochlear implant, or a spinal fluid leak?  Are you on long-term aspirin therapy?   No   Do you have cancer, leukemia, HIV/AIDS, or any other immune system problem?   Yes   Do you have a parent, brother, or sister with an immune system problem?   no   In the past 3 months, have you taken medications that affect  your immune system, such as prednisone, other steroids, or anticancer drugs; drugs for the treatment of rheumatoid arthritis, Crohn s disease, or psoriasis; or have you had radiation treatments?   yes   Have you had a seizure, or a brain or other nervous system problem?   No   During the past year, have you received a transfusion of blood or blood    products, or been given immune (gamma) globulin or antiviral drug?   No   For women: Are you pregnant or is there a chance you could become       pregnant during the next month?   No   Have you received any vaccinations in the past 4 weeks?   No     Immunization questionnaire was positive for at least one answer.  Notified Dr. Monroy consulted.  Ok to give as long as she is not on infusion treatment.  Pt states she is not on infusion.  She is on a daily medication for her cancer.    I have reviewed the following standing orders:   This patient is due and qualifies for the Covid-19 vaccine.     Click here for COVID-19 Standing Order    I have reviewed the vaccines inclusion and exclusion  criteria; No concerns regarding eligibility.     Patient instructed to remain in clinic for 15 minutes afterwards, and to report any adverse reactions.     Screening performed by NATALIIA ORTEZ on 11/15/2023 at 8:18 AM.

## 2024-03-16 PROBLEM — K81.0 ACUTE CHOLECYSTITIS: Status: RESOLVED | Noted: 2023-07-05 | Resolved: 2024-03-16

## 2024-03-17 SDOH — HEALTH STABILITY: PHYSICAL HEALTH: ON AVERAGE, HOW MANY DAYS PER WEEK DO YOU ENGAGE IN MODERATE TO STRENUOUS EXERCISE (LIKE A BRISK WALK)?: 7 DAYS

## 2024-03-17 SDOH — HEALTH STABILITY: PHYSICAL HEALTH: ON AVERAGE, HOW MANY MINUTES DO YOU ENGAGE IN EXERCISE AT THIS LEVEL?: 30 MIN

## 2024-03-17 ASSESSMENT — SOCIAL DETERMINANTS OF HEALTH (SDOH): HOW OFTEN DO YOU GET TOGETHER WITH FRIENDS OR RELATIVES?: MORE THAN THREE TIMES A WEEK

## 2024-03-18 ENCOUNTER — OFFICE VISIT (OUTPATIENT)
Dept: FAMILY MEDICINE | Facility: CLINIC | Age: 74
End: 2024-03-18
Attending: FAMILY MEDICINE
Payer: MEDICARE

## 2024-03-18 VITALS
TEMPERATURE: 97.7 F | SYSTOLIC BLOOD PRESSURE: 118 MMHG | HEART RATE: 80 BPM | RESPIRATION RATE: 18 BRPM | WEIGHT: 159 LBS | OXYGEN SATURATION: 99 % | BODY MASS INDEX: 27.14 KG/M2 | DIASTOLIC BLOOD PRESSURE: 80 MMHG | HEIGHT: 64 IN

## 2024-03-18 DIAGNOSIS — Z00.00 MEDICARE ANNUAL WELLNESS VISIT, SUBSEQUENT: Primary | ICD-10-CM

## 2024-03-18 DIAGNOSIS — C50.911 INFILTRATING DUCTAL CARCINOMA OF RIGHT BREAST (H): ICD-10-CM

## 2024-03-18 DIAGNOSIS — R73.01 IMPAIRED FASTING GLUCOSE: ICD-10-CM

## 2024-03-18 DIAGNOSIS — L50.3 DERMATOGRAPHIA: ICD-10-CM

## 2024-03-18 LAB
CHOLEST SERPL-MCNC: 242 MG/DL
FASTING STATUS PATIENT QL REPORTED: YES
FASTING STATUS PATIENT QL REPORTED: YES
GLUCOSE SERPL-MCNC: 114 MG/DL (ref 70–99)
HBA1C MFR BLD: 5.3 % (ref 0–5.6)
HDLC SERPL-MCNC: 82 MG/DL
LDLC SERPL CALC-MCNC: 141 MG/DL
NONHDLC SERPL-MCNC: 160 MG/DL
TRIGL SERPL-MCNC: 96 MG/DL

## 2024-03-18 PROCEDURE — 99213 OFFICE O/P EST LOW 20 MIN: CPT | Mod: 25 | Performed by: FAMILY MEDICINE

## 2024-03-18 PROCEDURE — 83036 HEMOGLOBIN GLYCOSYLATED A1C: CPT | Performed by: FAMILY MEDICINE

## 2024-03-18 PROCEDURE — 82947 ASSAY GLUCOSE BLOOD QUANT: CPT | Performed by: FAMILY MEDICINE

## 2024-03-18 PROCEDURE — 36415 COLL VENOUS BLD VENIPUNCTURE: CPT | Performed by: FAMILY MEDICINE

## 2024-03-18 PROCEDURE — G0439 PPPS, SUBSEQ VISIT: HCPCS | Performed by: FAMILY MEDICINE

## 2024-03-18 PROCEDURE — 80061 LIPID PANEL: CPT | Performed by: FAMILY MEDICINE

## 2024-03-18 RX ORDER — KETOROLAC TROMETHAMINE 30 MG/ML
1 INJECTION, SOLUTION INTRAMUSCULAR; INTRAVENOUS ONCE
Qty: 1 EACH | Refills: 0 | Status: SHIPPED | OUTPATIENT
Start: 2024-03-18 | End: 2024-03-18

## 2024-03-18 RX ORDER — BLOOD-GLUCOSE SENSOR
1 EACH MISCELLANEOUS
Qty: 6 EACH | Refills: 5 | Status: SHIPPED | OUTPATIENT
Start: 2024-03-18 | End: 2024-04-30

## 2024-03-18 ASSESSMENT — PAIN SCALES - GENERAL: PAINLEVEL: NO PAIN (0)

## 2024-03-18 NOTE — PROGRESS NOTES
"Preventive Care Visit  [facility]  Sammie Pérez MD, Family Medicine  Mar 18, 2024      Assessment & Plan     Medicare annual wellness visit, subsequent  At today's visit, we discussed lifestyle interventions to promote self-management and wellness, including maintenance of a healthy weight, healthy diet, regular physical activity and exercise, and falls prevention.  Immunizations up-to-date, may consider RSV vaccine series.  Will screen for diabetes and dyslipidemia today.  Up-to-date with mammogram screening, colon cancer screening, bone density screening.  - Lipid panel reflex to direct LDL Fasting; Future  - Lipid panel reflex to direct LDL Fasting    Impaired fasting glucose  Encourage efforts at healthy lifestyle habits.  Consider addition of resistance training.  She is interested in trying this freestyle sabrina 3 sensor, knows that she may pay out-of-pocket, prescription provided.  Will check glucose and A1c today.  - Glucose; Future  - Hemoglobin A1c; Future  - Continuous Blood Gluc  (FREESTYLE SABRINA 3 READER) KAREN; 1 each once for 1 dose Use to read blood sugars per 's instructions.  - Continuous Blood Gluc Sensor (FREESTYLE SABRINA 3 SENSOR) MISC; 1 each every 14 days Use 1 sensor every 14 days. Use to read blood sugars per 's instructions.  - Glucose  - Hemoglobin A1c    Infiltrating ductal carcinoma of right breast (H)  She continues to work with oncology team, remains on Arimidex under their care.    Dermatographia  Persistent, stable.  Daily Zyrtec.                BMI  Estimated body mass index is 27.29 kg/m  as calculated from the following:    Height as of this encounter: 1.626 m (5' 4\").    Weight as of this encounter: 72.1 kg (159 lb).       Counseling  Appropriate preventive services were discussed with this patient, including applicable screening as appropriate for fall prevention, nutrition, physical activity, Tobacco-use cessation, weight " loss and cognition.  Checklist reviewing preventive services available has been given to the patient.  Reviewed patient's diet, addressing concerns and/or questions.           Jd Kramer is a 73 year old, presenting for the following:  Wellness Visit (fasting)          Via the Health Maintenance questionnaire, the patient has reported the following services have been completed -Mammogram, this information has been sent to the abstraction team.  Health Care Directive  Patient has a Health Care Directive on file  Advance care planning document is on file and is current.    Seen in clinic today, accompanied by her , for routine preventive care visit.  No specific lotions or concerns.  Last year she began cetirizine for dermatographism and postnasal drainage, that is somewhat helpful though she still has some throat clearing.  Sometimes when walking she will get sudden sense of plugging of both ears, will rest and will feel better.  She does not feel lightheaded, dizzy, short of breath, or chest pain with it.  Exercising by walking, sometimes stationary bike.  Very healthy diet overall.  Sleeping well.            3/17/2024   General Health   How would you rate your overall physical health? Excellent   Feel stress (tense, anxious, or unable to sleep) Not at all         3/17/2024   Nutrition   Diet: Regular (no restrictions)         3/17/2024   Exercise   Days per week of moderate/strenous exercise 7 days   Average minutes spent exercising at this level 30 min         3/17/2024   Social Factors   Frequency of gathering with friends or relatives More than three times a week   Worry food won't last until get money to buy more No   Food not last or not have enough money for food? No   Do you have housing?  Yes   Are you worried about losing your housing? No   Lack of transportation? No   Unable to get utilities (heat,electricity)? No         3/17/2024   Activities of Daily Living- Home Safety   Needs help  with the following daily activites None of the above   Safety concerns in the home None of the above         3/17/2024   Dental   Dentist two times every year? Yes         3/17/2024   Hearing Screening   Hearing concerns? None of the above         3/17/2024   Driving Risk Screening   Patient/family members have concerns about driving No         3/17/2024   General Alertness/Fatigue Screening   Have you been more tired than usual lately? No         3/17/2024   Urinary Incontinence Screening   Bothered by leaking urine in past 6 months No         3/17/2024   TB Screening   Were you born outside of [place]?  No         Today's PHQ-2 Score:       3/17/2024    10:54 AM   PHQ-2 ( 1999 Pf[employer])   Q1: Little interest or pleasure in doing things 0   Q2: Feeling down, depressed or hopeless 0   PHQ-2 Score 0   Q1: Little interest or pleasure in doing things Not at all   Q2: Feeling down, depressed or hopeless Not at all   PHQ-2 Score 0           3/17/2024   Substance Use   Alcohol more than 3/day or more than 7/wk No   Do you have a current opioid prescription? No   How severe/bad is pain from 1 to 10? 0/10 (No Pain)   Do you use any other substances recreationally? No     Social History     Tobacco Use    Smoking status: Never    Smokeless tobacco: Never   Vaping Use    Vaping Use: Never used   Substance Use Topics    Alcohol use: Not Currently    Drug use: Never           3/15/2023   LAST FHS-7 RESULTS   1st degree relative breast or ovarian cancer No   Any relative bilateral breast cancer No   Any male have breast cancer No   Any ONE woman have BOTH breast AND ovarian cancer No   Any woman with breast cancer before 50yrs No   2 or more relatives with breast AND/OR ovarian cancer No   2 or more relatives with breast AND/OR bowel cancer No        Mammogram Screening - Mammogram every 1-2 years updated in Health Maintenance based on mutual decision making    ASCVD Risk   The 10-year ASCVD risk score (Cintia ARIAS, et al.,  2019) is: 10%    Values used to calculate the score:      Age: 73 years      Sex: Female      Is Non- : No      Diabetic: No      Tobacco smoker: No      Systolic Blood Pressure: 118 mmHg      Is BP treated: No      HDL Cholesterol: 72 mg/dL      Total Cholesterol: 217 mg/dL            Reviewed and updated as needed this visit by Provider   Tobacco  Allergies  Meds  Problems  Med Hx  Surg Hx  Fam Hx            Past Medical History:   Diagnosis Date    Diabetes (H)     Ductal carcinoma in situ (DCIS) of breast with microinvasive component (H)     right    Eczema     Hypertension     Seasonal allergic rhinitis      Past Surgical History:   Procedure Laterality Date    CATARACT EXTRACTION Bilateral     COLONOSCOPY      EYE SURGERY      LAPAROSCOPIC CHOLECYSTECTOMY N/A 7/5/2023    Procedure: CHOLECYSTECTOMY, LAPAROSCOPIC;  Surgeon: Giorgi Stephens MD;  Location: [facility] Main OR    LUMPECTOMY BREAST Right 6/8/2023    Procedure: BREAST TAG LOCALIZED LUMPECTOMY WITH SENTINEL LYMPH NODE BIOPSY;  Surgeon: Lillian Marie DO;  Location: [facility] Main OR     OB History   No obstetric history on file.     Lab work is in process  Labs reviewed in EPIC  BP Readings from Last 3 Encounters:   03/18/24 118/80   10/17/23 120/64   10/11/23 (!) 164/89    Wt Readings from Last 3 Encounters:   03/18/24 72.1 kg (159 lb)   10/17/23 72.6 kg (160 lb)   10/11/23 72.7 kg (160 lb 3.2 oz)                  Patient Active Problem List   Diagnosis    Impaired fasting glucose    Infiltrating ductal carcinoma of right breast (H)    Encounter for monitoring aromatase inhibitor therapy     Past Surgical History:   Procedure Laterality Date    CATARACT EXTRACTION Bilateral     COLONOSCOPY      EYE SURGERY      LAPAROSCOPIC CHOLECYSTECTOMY N/A 7/5/2023    Procedure: CHOLECYSTECTOMY, LAPAROSCOPIC;  Surgeon: Giorgi Stephens MD;  Location: [facility] Main OR    LUMPECTOMY BREAST Right 6/8/2023    Procedure: BREAST TAG  LOCALIZED LUMPECTOMY WITH SENTINEL LYMPH NODE BIOPSY;  Surgeon: Lillian Marie DO;  Location: [facility] Main OR       Social History     Tobacco Use    Smoking status: Never    Smokeless tobacco: Never   Substance Use Topics    Alcohol use: Not Currently     Family History   Problem Relation Age of Onset    Cancer Mother     Kidney Cancer Mother     Hypertension Father     Anxiety Disorder Sister     Anxiety Disorder Brother     Breast Cancer Paternal Grandmother     Anesthesia Reaction No family hx of          Current Outpatient Medications   Medication Sig Dispense Refill    anastrozole (ARIMIDEX) 1 MG tablet Take 1 tablet (1 mg) by mouth daily 90 tablet 1    cetirizine (ZYRTEC) 10 MG tablet Take 10 mg by mouth daily      Continuous Blood Gluc  (FREESTYLE HAYDER 3 READER) KAREN 1 each once for 1 dose Use to read blood sugars per 's instructions. 1 each 0    Continuous Blood Gluc Sensor (FREESTYLE HAYDER 3 SENSOR) MISC 1 each every 14 days Use 1 sensor every 14 days. Use to read blood sugars per 's instructions. 6 each 5    L. rhamnosus GG/inulin (CULTURELLE PROBIOTICS ORAL) Take 1 capsule by mouth daily as needed       No Known Allergies  Recent Labs   Lab Test 07/06/23  0442 07/05/23  0209 03/09/23  0914 03/09/23  0914 03/04/19  1358   A1C  --   --   --  5.5 5.6   LDL  --   --   --  129* 144*   HDL  --   --   --  72 68   TRIG  --   --   --  78 96   ALT 18 <9  --  7*  --    CR 0.67 0.72   < > 0.81  --    GFRESTIMATED >90 89   < > 77  --    POTASSIUM 4.6 4.4  --  5.0  --     < > = values in this interval not displayed.      Current providers sharing in care for this patient include:  Patient Care Team:  Sammie Pérez MD as PCP - General (Family Practice)  Sammie Pérez MD as Assigned PCP  Lillian Marie DO as Assigned Surgical Provider  Tati Wright MD as MD (Radiation Oncology)  Tati Wright MD as Assigned Cancer Care Provider    The following health maintenance items are reviewed  "in Epic and correct as of today:  Health Maintenance   Topic Date Due    RSV VACCINE (Pregnancy & 60+) (1 - 1-dose 60+ series) Never done    MAMMO SCREENING  03/23/2024    DTAP/TDAP/TD IMMUNIZATION (2 - Td or Tdap) 02/12/2025    MEDICARE ANNUAL WELLNESS VISIT  03/18/2025    ANNUAL REVIEW OF HM ORDERS  03/18/2025    FALL RISK ASSESSMENT  03/18/2025    COLORECTAL CANCER SCREENING  04/15/2025    GLUCOSE  07/06/2026    LIPID  03/09/2028    ADVANCE CARE PLANNING  03/18/2029    DEXA  03/20/2038    HEPATITIS C SCREENING  Completed    PHQ-2 (once per calendar year)  Completed    INFLUENZA VACCINE  Completed    Pneumococcal Vaccine: 65+ Years  Completed    ZOSTER IMMUNIZATION  Completed    COVID-19 Vaccine  Completed    IPV IMMUNIZATION  Aged Out    HPV IMMUNIZATION  Aged Out    MENINGITIS IMMUNIZATION  Aged Out    RSV MONOCLONAL ANTIBODY  Aged Out         Review of Systems  Constitutional, neuro, ENT, endocrine, pulmonary, cardiac, gastrointestinal, genitourinary, musculoskeletal, integument and psychiatric systems are negative, except as otherwise noted.     Objective    Exam  /80   Pulse 80   Temp 97.7  F (36.5  C) (Temporal)   Resp 18   Ht 1.626 m (5' 4\")   Wt 72.1 kg (159 lb)   SpO2 99%   BMI 27.29 kg/m     Estimated body mass index is 27.29 kg/m  as calculated from the following:    Height as of this encounter: 1.626 m (5' 4\").    Weight as of this encounter: 72.1 kg (159 lb).    Physical Exam  Physical Examination: General appearance - alert, well appearing, and in no distress, oriented to person, place, and time and normal appearing weight  Mental status - alert, oriented to person, place, and time, normal mood, behavior, speech, dress, motor activity, and thought processes  Eyes - pupils equal and reactive, extraocular eye movements intact  Ears - bilateral TM's and external ear canals normal  Nose - normal and patent, no erythema, discharge or polyps  Mouth - mucous membranes moist, pharynx normal " without lesions  Neck - supple, no significant adenopathy  Lymphatics - no palpable lymphadenopathy, no hepatosplenomegaly  Chest - clear to auscultation, no wheezes, rales or rhonchi, symmetric air entry  Heart - normal rate, regular rhythm, normal S1, S2, no murmurs, rubs, clicks or gallops  Abdomen - soft, nontender, nondistended, no masses or organomegaly  Breasts - breasts appear normal, no suspicious masses, no skin or nipple changes or axillary nodes; scarring right breast and axilla consistent with prior lumpectomy with lymph node dissection  Neurological - alert, oriented, normal speech, no focal findings or movement disorder noted  Musculoskeletal - no joint tenderness, deformity or swelling  Extremities - peripheral pulses normal, no pedal edema, no clubbing or cyanosis  Skin - normal coloration and turgor, mild erythematous dermatographia left forearm, no suspicious skin lesions noted           3/18/2024   Mini Cog   Clock Draw Score 2 Normal   3 Item Recall 3 objects recalled   Mini Cog Total Score 5              Signed Electronically by: Sammie Pérez MD

## 2024-03-18 NOTE — PATIENT INSTRUCTIONS
Consider a trial of Flonase nasal spray to help with postnasal drainage.  Preventive Care Advice   This is general advice given by our system to help you stay healthy. However, your care team may have specific advice just for you. Please talk to your care team about your preventive care needs.  Nutrition  Eat 5 or more servings of fruits and vegetables each day.  Try wheat bread, brown rice and whole grain pasta (instead of white bread, rice, and pasta).  Get enough calcium and vitamin D. Check the label on foods and aim for 100% of the RDA (recommended daily allowance).  Lifestyle  Exercise at least 150 minutes each week   (30 minutes a day, 5 days a week).  Do muscle strengthening activities 2 days a week. These help control your weight and prevent disease.  No smoking.  Wear sunscreen to prevent skin cancer.  Have a dental exam and cleaning every 6 months.  Yearly exams  See your health care team every year to talk about:  Any changes in your health.  Any medicines your care team has prescribed.  Preventive care, family planning, and ways to prevent chronic diseases.  Shots (vaccines)   HPV shots (up to age 26), if you've never had them before.  Hepatitis B shots (up to age 59), if you've never had them before.  COVID-19 shot: Get this shot when it's due.  Flu shot: Get a flu shot every year.  Tetanus shot: Get a tetanus shot every 10 years.  Pneumococcal, hepatitis A, and RSV shots: Ask your care team if you need these based on your risk.  Shingles shot (for age 50 and up).  General health tests  Diabetes screening:  Starting at age 35, Get screened for diabetes at least every 3 years.  If you are younger than age 35, ask your care team if you should be screened for diabetes.  Cholesterol test: At age 39, start having a cholesterol test every 5 years, or more often if advised.  Bone density scan (DEXA): At age 50, ask your care team if you should have this scan for osteoporosis (brittle bones).  Hepatitis C: Get  tested at least once in your life.  STIs (sexually transmitted infections)  Before age 24: Ask your care team if you should be screened for STIs.  After age 24: Get screened for STIs if you're at risk. You are at risk for STIs (including HIV) if:  You are sexually active with more than one person.  You don't use condoms every time.  You or a partner was diagnosed with a sexually transmitted infection.  If you are at risk for HIV, ask about PrEP medicine to prevent HIV.  Get tested for HIV at least once in your life, whether you are at risk for HIV or not.  Cancer screening tests  Cervical cancer screening: If you have a cervix, begin getting regular cervical cancer screening tests at age 21. Most people who have regular screenings with normal results can stop after age 65. Talk about this with your provider.  Breast cancer scan (mammogram): If you've ever had breasts, begin having regular mammograms starting at age 40. This is a scan to check for breast cancer.  Colon cancer screening: It is important to start screening for colon cancer at age 45.  Have a colonoscopy test every 10 years (or more often if you're at risk) Or, ask your provider about stool tests like a FIT test every year or Cologuard test every 3 years.  To learn more about your testing options, visit: https://www."[employer]/442069.pdf.  For help making a decision, visit: https://bit.ly/hk09658.  Prostate cancer screening test: If you have a prostate and are age 55 to 69, ask your provider if you would benefit from a yearly prostate cancer screening test.  Lung cancer screening: If you are a current or former smoker age 50 to 80, ask your care team if ongoing lung cancer screenings are right for you.  For informational purposes only. Not to replace the advice of your health care provider. Copyright   2023 [employer]. All rights reserved. Clinically reviewed by the [facility] Transitions Program. [employer] 619267 - REV 01/24.

## 2024-03-19 ENCOUNTER — MYC MEDICAL ADVICE (OUTPATIENT)
Dept: FAMILY MEDICINE | Facility: CLINIC | Age: 74
End: 2024-03-19
Payer: MEDICARE

## 2024-03-19 DIAGNOSIS — R73.01 IMPAIRED FASTING GLUCOSE: Primary | ICD-10-CM

## 2024-03-19 NOTE — RESULT ENCOUNTER NOTE
Your cholesterol is mildly elevated, and your risk of heart disease in the next 10 years is now mildly elevated at 10.1%.  Regular exercise, healthy eating (lots of fresh veggies and fruits, more lean meats and protein, and fewer sweets, baked goods, and bread products), and maintenance of a healthy weight can improve your cholesterol and reduce your risk of developing heart disease or stroke in the future.  We could consider medication such as atorvastatin (Lipitor), we could consider testing for blockage of the blood vessels to the heart with a test called a calcium score, or we could watch this over time by rechecking it in one year.  Let me know your thoughts-- if best for you, you could also schedule a visit (in person or virtual) with me to review this in further detail.

## 2024-03-20 DIAGNOSIS — E78.2 MIXED HYPERLIPIDEMIA: Primary | ICD-10-CM

## 2024-03-20 RX ORDER — ATORVASTATIN CALCIUM 20 MG/1
20 TABLET, FILM COATED ORAL DAILY
Qty: 90 TABLET | Refills: 4 | Status: SHIPPED | OUTPATIENT
Start: 2024-03-20

## 2024-03-20 RX ORDER — BLOOD-GLUCOSE SENSOR
1 EACH MISCELLANEOUS
Qty: 6 EACH | Refills: 5 | Status: SHIPPED | OUTPATIENT
Start: 2024-03-20 | End: 2024-04-30

## 2024-03-20 RX ORDER — KETOROLAC TROMETHAMINE 30 MG/ML
1 INJECTION, SOLUTION INTRAMUSCULAR; INTRAVENOUS ONCE
Qty: 1 EACH | Refills: 0 | Status: SHIPPED | OUTPATIENT
Start: 2024-03-20 | End: 2024-03-20

## 2024-03-27 ENCOUNTER — HOSPITAL ENCOUNTER (OUTPATIENT)
Dept: MAMMOGRAPHY | Facility: CLINIC | Age: 74
Discharge: HOME | End: 2024-03-27
Attending: FAMILY MEDICINE | Admitting: FAMILY MEDICINE
Payer: MEDICARE

## 2024-03-27 ENCOUNTER — TELEPHONE (OUTPATIENT)
Dept: MAMMOGRAPHY | Facility: CLINIC | Age: 74
End: 2024-03-27

## 2024-03-27 DIAGNOSIS — R55 VASOVAGAL SYNCOPE: Primary | ICD-10-CM

## 2024-03-27 DIAGNOSIS — Z12.31 VISIT FOR SCREENING MAMMOGRAM: ICD-10-CM

## 2024-03-27 LAB
ATRIAL RATE - MUSE: 72 BPM
DIASTOLIC BLOOD PRESSURE - MUSE: NORMAL MMHG
GLUCOSE BLDC GLUCOMTR-MCNC: 167 MG/DL (ref 70–99)
INTERPRETATION ECG - MUSE: NORMAL
P AXIS - MUSE: 69 DEGREES
PR INTERVAL - MUSE: 118 MS
QRS DURATION - MUSE: 84 MS
QT - MUSE: 418 MS
QTC - MUSE: 438 MS
R AXIS - MUSE: 28 DEGREES
SYSTOLIC BLOOD PRESSURE - MUSE: NORMAL MMHG
T AXIS - MUSE: 56 DEGREES
VENTRICULAR RATE- MUSE: 66 BPM

## 2024-03-27 PROCEDURE — 93010 ELECTROCARDIOGRAM REPORT: CPT | Mod: HOP | Performed by: INTERNAL MEDICINE

## 2024-03-27 PROCEDURE — 93005 ELECTROCARDIOGRAM TRACING: CPT

## 2024-03-27 PROCEDURE — 77063 BREAST TOMOSYNTHESIS BI: CPT

## 2024-03-27 PROCEDURE — 82962 GLUCOSE BLOOD TEST: CPT

## 2024-03-27 NOTE — TELEPHONE ENCOUNTER
Dr Pérez,  Please see FYI from patient.    Rebeca Lewis, DORETHAN, RN  [facility]

## 2024-03-27 NOTE — TELEPHONE ENCOUNTER
"See my progress note from 3/27/24 when pt was at [facility] and experienced syncope.    I called pt to follow-up and to see how she was feeling now. Pt reports she is feeling well and \"back at it\". Pt states she is resting and denies any return of any symptoms experienced earlier today while at the [facility].     Pt stated she was anxious about her appt today and believes that contributed to her fainting.    Pt states if symptoms return she will see her provider or call 911 if an emergency.    Pt was complimentary of staff and verbalized appreciation for the \"good care\" she received today while at the [facility]. Pt states she plans to return Friday, 3/29/24, at 1:00pm, to complete imaging.     Calls welcomed.    Genie Mckeon, RN, BSN, [facility]  [facility]  "

## 2024-03-27 NOTE — PROGRESS NOTES
"At approx 0838am, I heard Nathalie Lee, Lead Mammo Tech, shout my name from a nearby room in the [facility]. I immediately went into Mammo Room 1 where Nathalie was with pt. The pt was seated but unresponsive, pale, sweaty, and slouched with head being supported by Nathalie. Pt was breathing but not responding to Nathalie's continuous calling pt by her name. I immediately called [phone] for Rapid Response Code and directed Christel Nance, Mammo Tech, to get Dr Brooke, the Radiologist at the [facility] today. As I was giving the  the location details for the Rapid Response the pt regained consciousness and spoke. Dr Brooke was at patient's side at that time. Dr Brooke began assessing pt. Pt was appropriately answering questions. When pt saw me, she knew who I was from previous visits at the [facility]. Pt states she has a history of fainting in the past. No chest pain or shortness of breath. Pt is not diabetic. Pt states she had coffee and yogurt for breakfast today. Pt states she normally drinks lots of water every day, but did not drink water yet today. Pt states she was nervous about having a mammogram today since this was the first one since her breast cancer diagnosis.    Nathalie reported pt was getting her first mammogram post breast cancer diagnosis, when pt began to say she was feeling \"hot\". Nathalie had pt sit down on chair right away, applied a wet washcloth to pt neck, turned on a fan, and then pt lost consciousness a short while thereafter. At that time Nathalie shouted for me.     The Rapid Response Team arrived quickly after code was called, and began to assess and care for pt--checking vitals, blood sugar, and EKG. Nathalie reported pt was unconscious for approx 30-45 seconds. Pt complained of continued feelings of shakiness and some nausea. Pt was sipping water. Once pt began to feel improvement enough to transfer from chair to cart, pt was moved from Mammo Room 1 to  Room 2 on " a cart and the Rapid Response Team continued to monitor pt's vitals and symptoms. Pt's  was brought to US Room 2. Pt declined being evaluated in ED, stating she was feeling better. Pt's symptoms and vital signs continued to improve per Rapid Response Team. Pt stated she was ready to go home. The Rapid Response Team handed off the pt to me and Nathalie, and left the [facility] at approx 0910am. I thanked the Team. Pt then got dressed without any returning or worsening symptoms.     Pt plans to return Friday, 3/29/24, at approx 1:00pm, to complete mammogram. Nathalie, Mammo tech, will plan to perform rest of mammogram on Friday. Pt was instructed not to come on Friday if not feeling well.     At 0915am, I escorted pt via wheelchair from [facility] to [facility] front entrance, where  drove car up to and met the pt and I there. Pt stood up  from wheelchair and got into car without any issues. Pt states she continues to feel better. Pt appreciative of care she received today. I advised pt to rest today, eat light foods, and drink plenty of water. Pt will seek medical attention if symptoms return. At 0920am pt was on her way home.    Genie Mckeon, RN, BSN, [facility]  [facility]

## 2024-04-30 ENCOUNTER — ONCOLOGY VISIT (OUTPATIENT)
Dept: ONCOLOGY | Facility: HOSPITAL | Age: 74
End: 2024-04-30
Attending: INTERNAL MEDICINE
Payer: MEDICARE

## 2024-04-30 VITALS
HEART RATE: 86 BPM | RESPIRATION RATE: 16 BRPM | TEMPERATURE: 97.9 F | WEIGHT: 161.4 LBS | BODY MASS INDEX: 27.55 KG/M2 | SYSTOLIC BLOOD PRESSURE: 157 MMHG | HEIGHT: 64 IN | OXYGEN SATURATION: 97 % | DIASTOLIC BLOOD PRESSURE: 88 MMHG

## 2024-04-30 DIAGNOSIS — C50.911 INFILTRATING DUCTAL CARCINOMA OF RIGHT BREAST (H): Primary | ICD-10-CM

## 2024-04-30 DIAGNOSIS — Z79.811 ENCOUNTER FOR MONITORING AROMATASE INHIBITOR THERAPY: ICD-10-CM

## 2024-04-30 DIAGNOSIS — Z78.0 MENOPAUSE: ICD-10-CM

## 2024-04-30 DIAGNOSIS — Z51.81 ENCOUNTER FOR MONITORING AROMATASE INHIBITOR THERAPY: ICD-10-CM

## 2024-04-30 DIAGNOSIS — C50.911 INVASIVE DUCTAL CARCINOMA OF BREAST, STAGE 1, RIGHT (H): ICD-10-CM

## 2024-04-30 PROCEDURE — G0463 HOSPITAL OUTPT CLINIC VISIT: HCPCS | Performed by: INTERNAL MEDICINE

## 2024-04-30 PROCEDURE — 99214 OFFICE O/P EST MOD 30 MIN: CPT | Performed by: INTERNAL MEDICINE

## 2024-04-30 PROCEDURE — G2211 COMPLEX E/M VISIT ADD ON: HCPCS | Performed by: INTERNAL MEDICINE

## 2024-04-30 RX ORDER — ANASTROZOLE 1 MG/1
1 TABLET ORAL DAILY
Qty: 90 TABLET | Refills: 3 | Status: SHIPPED | OUTPATIENT
Start: 2024-04-30

## 2024-04-30 ASSESSMENT — PAIN SCALES - GENERAL: PAINLEVEL: NO PAIN (0)

## 2024-04-30 NOTE — LETTER
"    4/30/2024         RE: Nia Prieto  [address] [facility] [place] [address]        Dear Colleague,    Thank you for referring your patient, Nia Prieto, to the [facility] CANCER [facility]. Please see a copy of my visit note below.    Oncology Rooming Note    April 30, 2024 9:48 AM   Nia Prieto is a 73 year old female who presents for:    Chief Complaint   Patient presents with     Oncology Clinic Visit     Infiltrating ductal carcinoma of right breast (H)     Initial Vitals: BP (!) 157/88   Pulse 86   Temp 97.9  F (36.6  C)   Resp 16   Ht 1.613 m (5' 3.5\")   Wt 73.2 kg (161 lb 6.4 oz)   SpO2 97%   BMI 28.14 kg/m   Estimated body mass index is 28.14 kg/m  as calculated from the following:    Height as of this encounter: 1.613 m (5' 3.5\").    Weight as of this encounter: 73.2 kg (161 lb 6.4 oz). Body surface area is 1.81 meters squared.  No Pain (0) Comment: Data Unavailable   No LMP recorded. Patient is postmenopausal.  Allergies reviewed: Yes  Medications reviewed: Yes    Medications: Medication refills not needed today.  Pharmacy name entered into [facility]: [id number] [place] [facility] [address]    Frailty Screening:   Is the patient here for a new oncology consult visit in cancer care? 2. No      Clinical concerns: Marita Akbar LPN               [facility] Hematology and Oncology Progress Note    Patient: Nia Prieto  MRN: [id number]  Date of Service: Apr 30, 2024         Reason for Visit    Chief Complaint   Patient presents with     Oncology Clinic Visit     Infiltrating ductal carcinoma of right breast (H)       Assessment and Plan     Cancer Staging   Infiltrating ductal carcinoma of right breast (H)  Staging form: Breast, AJCC 8th Edition  - Pathologic stage from 6/22/2023: Stage IA (pT1b, pN0(sn), cM0, G2, ER+, WV+, HER2-) - Signed by Lennie Herrera MD on 7/10/2023      ECOG Performance    0 - " Independent     Pain  Pain Score: No Pain (0)    #.  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  Grade 2.  ER positive, ID positive, HER2 negative.    #.  Low bone density    She is clinically well without signs and symptoms suggestive for breast cancer recurrence.  Mammogram from March 2024 was benign.  She tolerates current aromatase inhibitor therapy well without major intolerable side effects.  She recently noticed some achiness in her hands and feet.  It appear soon after starting Lipitor.  Although musculoskeletal side effects as seen with anastrozole, it could be related to Lipitor.  Her symptoms are currently manageable.  She will follow-up with her primary care provider as well.    Plan for adjuvant endocrine therapy is 5 years.     She does not have any indication for systemic imaging.  I discussed that we will obtain imaging with clinical signs and symptoms concerning for breast cancer recurrence, but not routinely.   She is advised to continue annual screening mammogram, next due in 3/2025.   I recommended continue clinical surveillance with follow up clinical exam in 6 months.  She is advised to call me sooner with any concerns.    #. Bone health   I recommended her to continue calcium/vitamin D and weightbearing exercises for bone health.  Recommended DEXA scan in 6 months and to complete prior to next visit.    Surveillance:   History and Physical Exam: q3-6 months years 1-3, q6 months while receiving adjuvant therapy  Mammogram: Yearly  Bone mineral density (DEXA) scan, postmenopausal or receiving adjuvant hormonal therapy: q2 years     The longitudinal plan of care for the diagnosis(es)/condition(s) as documented were addressed during this visit. Due to the added complexity in care, I will continue to support Nia in the subsequent management and with ongoing continuity of care.    Encounter Diagnoses:    Problem List Items Addressed This Visit           Oncology Diagnoses    Infiltrating ductal carcinoma of right breast (H) - Primary    Relevant Orders    DX Bone Density       Other    Encounter for monitoring aromatase inhibitor therapy    Relevant Orders    DX Bone Density     Other Visit Diagnoses       Menopause        Relevant Orders    DX Bone Density    Invasive ductal carcinoma of breast, stage 1, right (H)        Relevant Medications    anastrozole (ARIMIDEX) 1 MG tablet               CC: Sammei Pérez MD   ______________________________________________________________________________  Diagnosis/ Treatment to date  6/2023- Invasive ductal carcinoma of the upper outer quadrant of the right breast.    Grade 2.   5.3 x 5 mm, Negative margins   There is associated DCIS with close margin.   pT1b (sn)N0   ER positive (%), KS positive (%), HER2 negative (1+ by IHC).      6/8/2023- right breast lumpectomy and right axillary sentinel lymph node biopsy (Dr. Marie)  7/25/2023-8/21/2023-5256 cGy in 20 treatments of adjuvant radiation  8/22/2023-initiated anastrozole    History of Present Illness    Ms. Nia Prieto presented today for follow-up accompanied by her .  She reported she is a little achy lately.  She has been on anastrozole since August 2023.  She noted some achiness in her hands and soles of the feet for about a month.  She was started Lipitor on 3/20/2024.  In addition, she was walking a lot during her vacation and felt that she has been walking a bit on and even surfaces.     Review of systems  Apart from describing in HPI, the remainder of comprehensive ROS was negative.    Past History    Past Medical History:   Diagnosis Date     Diabetes (H)      Ductal carcinoma in situ (DCIS) of breast with microinvasive component (H)     right     Eczema      Hypertension      Seasonal allergic rhinitis        Past Surgical History:   Procedure Laterality Date     CATARACT EXTRACTION Bilateral      COLONOSCOPY       EYE SURGERY        "LAPAROSCOPIC CHOLECYSTECTOMY N/A 7/5/2023    Procedure: CHOLECYSTECTOMY, LAPAROSCOPIC;  Surgeon: Giorgi Stephens MD;  Location: [facility] OR     LUMPECTOMY BREAST Right 6/8/2023    Procedure: BREAST TAG LOCALIZED LUMPECTOMY WITH SENTINEL LYMPH NODE BIOPSY;  Surgeon: Lillian Marie DO;  Location: [facility] OR       Physical Exam    BP (!) 157/88   Pulse 86   Temp 97.9  F (36.6  C)   Resp 16   Ht 1.613 m (5' 3.5\")   Wt 73.2 kg (161 lb 6.4 oz)   SpO2 97%   BMI 28.14 kg/m      General: alert, awake, not in acute distress  HEENT: Head: Normal, normocephalic, atraumatic.  Eye: Normal external eye, conjunctiva, lids cornea, MOY.  Pharynx: Normal buccal mucosa. Normal pharynx.  Neck / Thyroid: Supple, no masses, nodes, nodules or enlargement.  Lymphatics: No abnormally enlarged lymph nodes.  Chest: Normal chest wall and respirations. Clear to auscultation.  Breasts: No palpable axillary masses.  Heart: S1 S2 RRR.   Abdomen: abdomen is soft without significant tenderness, masses, organomegaly or guarding  Extremities: normal strength, tone, and muscle mass  Skin: normal. no rash or abnormalities  CNS: non focal.    Lab Results    No results found for this or any previous visit (from the past 168 hour(s)).      Imaging    No results found.    30 minutes spent on the date of the encounter doing chart review, history and exam, documentation, communication of the treatment plan with the care team and further activities as noted above.    Signed by: Lennie Herrera MD        Again, thank you for allowing me to participate in the care of your patient.        Sincerely,        Lennie Herrera MD  "

## 2024-04-30 NOTE — PROGRESS NOTES
"Oncology Rooming Note    April 30, 2024 9:48 AM   Nia Prieto is a 73 year old female who presents for:    Chief Complaint   Patient presents with    Oncology Clinic Visit     Infiltrating ductal carcinoma of right breast (H)     Initial Vitals: BP (!) 157/88   Pulse 86   Temp 97.9  F (36.6  C)   Resp 16   Ht 1.613 m (5' 3.5\")   Wt 73.2 kg (161 lb 6.4 oz)   SpO2 97%   BMI 28.14 kg/m   Estimated body mass index is 28.14 kg/m  as calculated from the following:    Height as of this encounter: 1.613 m (5' 3.5\").    Weight as of this encounter: 73.2 kg (161 lb 6.4 oz). Body surface area is 1.81 meters squared.  No Pain (0) Comment: Data Unavailable   No LMP recorded. Patient is postmenopausal.  Allergies reviewed: Yes  Medications reviewed: Yes    Medications: Medication refills not needed today.  Pharmacy name entered into [id number] [place] [address]    Frailty Screening:   Is the patient here for a new oncology consult visit in cancer care? 2. No      Clinical concerns: Marita Akbar LPN             "

## 2024-04-30 NOTE — PROGRESS NOTES
[facility] Hematology and Oncology Progress Note    Patient: Nia Prieto  MRN: [id number]  Date of Service: Apr 30, 2024         Reason for Visit    Chief Complaint   Patient presents with    Oncology Clinic Visit     Infiltrating ductal carcinoma of right breast (H)       Assessment and Plan     Cancer Staging   Infiltrating ductal carcinoma of right breast (H)  Staging form: Breast, AJCC 8th Edition  - Pathologic stage from 6/22/2023: Stage IA (pT1b, pN0(sn), cM0, G2, ER+, NV+, HER2-) - Signed by Lennie Herrera MD on 7/10/2023      ECOG Performance    0 - Independent     Pain  Pain Score: No Pain (0)    #.  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  Grade 2.  ER positive, NV positive, HER2 negative.    #.  Low bone density    She is clinically well without signs and symptoms suggestive for breast cancer recurrence.  Mammogram from March 2024 was benign.  She tolerates current aromatase inhibitor therapy well without major intolerable side effects.  She recently noticed some achiness in her hands and feet.  It appear soon after starting Lipitor.  Although musculoskeletal side effects as seen with anastrozole, it could be related to Lipitor.  Her symptoms are currently manageable.  She will follow-up with her primary care provider as well.    Plan for adjuvant endocrine therapy is 5 years.     She does not have any indication for systemic imaging.  I discussed that we will obtain imaging with clinical signs and symptoms concerning for breast cancer recurrence, but not routinely.   She is advised to continue annual screening mammogram, next due in 3/2025.   I recommended continue clinical surveillance with follow up clinical exam in 6 months.  She is advised to call me sooner with any concerns.    #. Bone health   I recommended her to continue calcium/vitamin D and weightbearing exercises for bone health.  Recommended DEXA scan in 6 months and to complete  prior to next visit.    Surveillance:   History and Physical Exam: q3-6 months years 1-3, q6 months while receiving adjuvant therapy  Mammogram: Yearly  Bone mineral density (DEXA) scan, postmenopausal or receiving adjuvant hormonal therapy: q2 years     The longitudinal plan of care for the diagnosis(es)/condition(s) as documented were addressed during this visit. Due to the added complexity in care, I will continue to support Nia in the subsequent management and with ongoing continuity of care.    Encounter Diagnoses:    Problem List Items Addressed This Visit          Oncology Diagnoses    Infiltrating ductal carcinoma of right breast (H) - Primary    Relevant Orders    DX Bone Density       Other    Encounter for monitoring aromatase inhibitor therapy    Relevant Orders    DX Bone Density     Other Visit Diagnoses       Menopause        Relevant Orders    DX Bone Density    Invasive ductal carcinoma of breast, stage 1, right (H)        Relevant Medications    anastrozole (ARIMIDEX) 1 MG tablet               CC: Sammie Pérez MD   ______________________________________________________________________________  Diagnosis/ Treatment to date  6/2023- Invasive ductal carcinoma of the upper outer quadrant of the right breast.    Grade 2.   5.3 x 5 mm, Negative margins   There is associated DCIS with close margin.   pT1b (sn)N0   ER positive (%), DC positive (%), HER2 negative (1+ by IHC).      6/8/2023- right breast lumpectomy and right axillary sentinel lymph node biopsy (Dr. Marie)  7/25/2023-8/21/2023-5256 cGy in 20 treatments of adjuvant radiation  8/22/2023-initiated anastrozole    History of Present Illness    Ms. Nia Prieto presented today for follow-up accompanied by her .  She reported she is a little achy lately.  She has been on anastrozole since August 2023.  She noted some achiness in her hands and soles of the feet for about a month.  She was started Lipitor on 3/20/2024.  " In addition, she was walking a lot during her vacation and felt that she has been walking a bit on and even surfaces.     Review of systems  Apart from describing in HPI, the remainder of comprehensive ROS was negative.    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)     right    Eczema     Hypertension     Seasonal allergic rhinitis        Past Surgical History:   Procedure Laterality Date    CATARACT EXTRACTION Bilateral     COLONOSCOPY      EYE SURGERY      LAPAROSCOPIC CHOLECYSTECTOMY N/A 7/5/2023    Procedure: CHOLECYSTECTOMY, LAPAROSCOPIC;  Surgeon: Giorgi Stephens MD;  Location: [facility] OR    LUMPECTOMY BREAST Right 6/8/2023    Procedure: BREAST TAG LOCALIZED LUMPECTOMY WITH SENTINEL LYMPH NODE BIOPSY;  Surgeon: Lillian Marie DO;  [facility]       Physical Exam    BP (!) 157/88   Pulse 86   Temp 97.9  F (36.6  C)   Resp 16   Ht 1.613 m (5' 3.5\")   Wt 73.2 kg (161 lb 6.4 oz)   SpO2 97%   BMI 28.14 kg/m      General: alert, awake, not in acute distress  HEENT: Head: Normal, normocephalic, atraumatic.  Eye: Normal external eye, conjunctiva, lids cornea, MOY.  Pharynx: Normal buccal mucosa. Normal pharynx.  Neck / Thyroid: Supple, no masses, nodes, nodules or enlargement.  Lymphatics: No abnormally enlarged lymph nodes.  Chest: Normal chest wall and respirations. Clear to auscultation.  Breasts: No palpable axillary masses.  Heart: S1 S2 RRR.   Abdomen: abdomen is soft without significant tenderness, masses, organomegaly or guarding  Extremities: normal strength, tone, and muscle mass  Skin: normal. no rash or abnormalities  CNS: non focal.    Lab Results    No results found for this or any previous visit (from the past 168 hour(s)).      Imaging    No results found.    30 minutes spent on the date of the encounter doing chart review, history and exam, documentation, " communication of the treatment plan with the care team and further activities as noted above.    Signed by: Lennie Herrera MD

## 2024-05-07 ENCOUNTER — HOSPITAL ENCOUNTER (OUTPATIENT)
Dept: CT IMAGING | Facility: CLINIC | Age: 74
Discharge: HOME | End: 2024-05-07
Attending: FAMILY MEDICINE | Admitting: FAMILY MEDICINE
Payer: MEDICARE

## 2024-05-07 DIAGNOSIS — R73.01 IMPAIRED FASTING GLUCOSE: ICD-10-CM

## 2024-05-07 LAB
CV CALCIUM SCORE AGATSTON LM: 0
CV CALCIUM SCORING AGATSON LAD: 0
CV CALCIUM SCORING AGATSTON CX: 0
CV CALCIUM SCORING AGATSTON RCA: 0
CV CALCIUM SCORING AGATSTON TOTAL: 0

## 2024-05-07 PROCEDURE — G1010 CDSM STANSON: HCPCS | Performed by: INTERNAL MEDICINE

## 2024-05-07 PROCEDURE — 75571 CT HRT W/O DYE W/CA TEST: CPT | Mod: 26 | Performed by: INTERNAL MEDICINE

## 2024-05-07 PROCEDURE — 75571 CT HRT W/O DYE W/CA TEST: CPT | Mod: ME,GA

## 2024-06-17 NOTE — PROGRESS NOTES
History:  Nia Prieto presents for one year follow-up of breast cancer.  She is s/p right breast lumpectomy with sentinel lymph node biopsy in June 2023.  This was followed by radiation and initiation of endocrine therapy.  She is doing well.  States that she did not have any big issues with radiation.  Is tolerating Arimidex with some side effects during weather changes.  She has noticed that the right breast is smaller than the left.  She recently had a bra fitting but the bra still does not fit quite right.    Physical exam:  BREAST: Right breast quite a bit smaller compared to the left.  Right breast skin is darker.  Scar in the upper outer quadrant.  No palpable masses or abnormalities bilaterally.    Imaging:  Pertinent images personally reviewed by myself and discussed with the patient.  Radiology reports:  BILATERAL FULL FIELD DIGITAL SCREENING MAMMOGRAM WITH TOMOSYNTHESIS     Performed on: 3/27/24     Compared to: 06/07/2023, 04/20/2023, 03/23/2023, 03/15/2023, 12/24/2018, and 04/26/2016     Technique:  This study was evaluated with the assistance of Computer-Aided Detection.  Breast Tomosynthesis was used in interpretation.     Findings: The breasts have scattered areas of fibroglandular density.  There are breast conservation changes in the right breast.  There is no radiographic evidence of malignancy.                                                                    IMPRESSION: ACR BI-RADS Category 2: Benign    ASSESSMENT:  Nia Prieto is s/p right lumpectomy with SLN for an invasive ductal carcinoma    - Grade II, T1, N0, ER/ID+, HER2- --> pathologic prognostic stage IA  Significant breast asymmetry from the radiation    PLAN:  Referral placed to prosthetics for bra fitting  Most recent imaging reviewed by myself  Continue with yearly mammograms and endocrine therapy  Continue with annual clinical breast exams  Return to the breast clinic as needed    Lillian Marie DO  General Surgeon  ELOISE  [facility]  [address] [place] [id number]  Office: [phone]  Employed by - [facility]

## 2024-06-18 ENCOUNTER — ONCOLOGY VISIT (OUTPATIENT)
Dept: SURGERY | Facility: HOSPITAL | Age: 74
End: 2024-06-18
Attending: SURGERY
Payer: MEDICARE

## 2024-06-18 VITALS — HEIGHT: 64 IN | BODY MASS INDEX: 27.49 KG/M2 | WEIGHT: 161 LBS

## 2024-06-18 DIAGNOSIS — N64.89 ACQUIRED BREAST DEFORMITY: ICD-10-CM

## 2024-06-18 DIAGNOSIS — Z85.3 HX: BREAST CANCER: Primary | ICD-10-CM

## 2024-06-18 PROCEDURE — G0463 HOSPITAL OUTPT CLINIC VISIT: HCPCS | Performed by: SURGERY

## 2024-06-18 PROCEDURE — 99212 OFFICE O/P EST SF 10 MIN: CPT | Performed by: SURGERY

## 2024-06-18 RX ORDER — CALCIUM CARBONATE/VITAMIN D3 600 MG-10
1 TABLET ORAL 2 TIMES DAILY
COMMUNITY

## 2024-06-18 ASSESSMENT — PAIN SCALES - GENERAL: PAINLEVEL: NO PAIN (0)

## 2024-06-18 NOTE — NURSING NOTE
"Oncology Rooming Note    June 18, 2024 2:40 PM   Nia Prieto is a 73 year old female who presents for:    Chief Complaint   Patient presents with    Consult     Returning patient consult: breast cancer 1 year follow up.       Initial Vitals: Ht 1.613 m (5' 3.5\")   Wt 73 kg (161 lb)   BMI 28.07 kg/m   Estimated body mass index is 28.07 kg/m  as calculated from the following:    Height as of this encounter: 1.613 m (5' 3.5\").    Weight as of this encounter: 73 kg (161 lb). Body surface area is 1.81 meters squared.  No Pain (0) Comment: Data Unavailable   No LMP recorded. Patient is postmenopausal.  Allergies reviewed: Yes  Medications reviewed: Yes    Medications: Medication refills not needed today.  Pharmacy name entered into [id number] [place] [address]    Frailty Screening:   Is the patient here for a new oncology consult visit in cancer care? 2. No      Clinical concerns:  RETURN [facility] - 1 year follow up.       Sylvia Licona MA             "

## 2024-10-23 ENCOUNTER — ANCILLARY PROCEDURE (OUTPATIENT)
Dept: BONE DENSITY | Facility: CLINIC | Age: 74
End: 2024-10-23
Attending: INTERNAL MEDICINE
Payer: MEDICARE

## 2024-10-23 DIAGNOSIS — Z79.811 ENCOUNTER FOR MONITORING AROMATASE INHIBITOR THERAPY: ICD-10-CM

## 2024-10-23 DIAGNOSIS — Z78.0 MENOPAUSE: ICD-10-CM

## 2024-10-23 DIAGNOSIS — Z51.81 ENCOUNTER FOR MONITORING AROMATASE INHIBITOR THERAPY: ICD-10-CM

## 2024-10-23 DIAGNOSIS — C50.911 INFILTRATING DUCTAL CARCINOMA OF RIGHT BREAST (H): ICD-10-CM

## 2024-10-23 PROCEDURE — 77080 DXA BONE DENSITY AXIAL: CPT | Mod: TC | Performed by: PHYSICIAN ASSISTANT

## 2024-10-23 PROCEDURE — 77091 TBS TECHL CALCULATION ONLY: CPT | Performed by: PHYSICIAN ASSISTANT

## 2024-10-30 ENCOUNTER — ONCOLOGY VISIT (OUTPATIENT)
Dept: ONCOLOGY | Facility: HOSPITAL | Age: 74
End: 2024-10-30
Attending: INTERNAL MEDICINE
Payer: MEDICARE

## 2024-10-30 VITALS
OXYGEN SATURATION: 96 % | WEIGHT: 169.2 LBS | HEART RATE: 73 BPM | HEIGHT: 64 IN | DIASTOLIC BLOOD PRESSURE: 88 MMHG | SYSTOLIC BLOOD PRESSURE: 142 MMHG | TEMPERATURE: 97.2 F | RESPIRATION RATE: 16 BRPM | BODY MASS INDEX: 28.89 KG/M2

## 2024-10-30 DIAGNOSIS — C50.911 INVASIVE DUCTAL CARCINOMA OF BREAST, STAGE 1, RIGHT (H): ICD-10-CM

## 2024-10-30 DIAGNOSIS — Z51.81 ENCOUNTER FOR MONITORING AROMATASE INHIBITOR THERAPY: Primary | ICD-10-CM

## 2024-10-30 DIAGNOSIS — Z79.811 ENCOUNTER FOR MONITORING AROMATASE INHIBITOR THERAPY: Primary | ICD-10-CM

## 2024-10-30 PROCEDURE — G2211 COMPLEX E/M VISIT ADD ON: HCPCS | Performed by: INTERNAL MEDICINE

## 2024-10-30 PROCEDURE — G0463 HOSPITAL OUTPT CLINIC VISIT: HCPCS | Performed by: INTERNAL MEDICINE

## 2024-10-30 PROCEDURE — 99214 OFFICE O/P EST MOD 30 MIN: CPT | Performed by: INTERNAL MEDICINE

## 2024-10-30 RX ORDER — ANASTROZOLE 1 MG/1
1 TABLET ORAL DAILY
Qty: 90 TABLET | Refills: 3 | Status: SHIPPED | OUTPATIENT
Start: 2024-10-30

## 2024-10-30 ASSESSMENT — PAIN SCALES - GENERAL: PAINLEVEL_OUTOF10: MODERATE PAIN (5)

## 2024-10-30 NOTE — LETTER
10/30/2024      Nia Prieto  [address] [address]      Dear Colleague,    Thank you for referring your patient, Nia Prieto, to the [facility] [place]. Please see a copy of my visit note below.    [facility] Hematology and Oncology Progress Note    Patient: Nia Prieto  MRN: [id number]  Date of Service: Oct 30, 2024         Reason for Visit    Chief Complaint   Patient presents with     Oncology Clinic Visit     Infiltrating ductal carcinoma of right breast       Assessment and Plan     Cancer Staging   Infiltrating ductal carcinoma of right breast (H)  Staging form: Breast, AJCC 8th Edition  - Pathologic stage from 6/22/2023: Stage IA (pT1b, pN0(sn), cM0, G2, ER+, FL+, HER2-) - Signed by Lennie Herrera MD on 7/10/2023      ECOG Performance    0 - Independent     Pain  Pain Score: Moderate Pain (5)  Pain Loc: Hand (bilateral hands and feet)    #.  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  Grade 2.  ER positive, FL positive, HER2 negative.    She is clinically well without signs and symptoms suggestive for breast cancer recurrence.  Mammogram from March 2024 was benign.  She tolerates current aromatase inhibitor therapy well without major intolerable side effects.  The achiness in her hands and feet continues but has improved since stopping Lipitor 4/2024.  Plan for adjuvant endocrine therapy is 5 years. She has been on it for over one year now.    She does not have any indication for systemic imaging.  I discussed that we will obtain imaging with clinical signs and symptoms concerning for breast cancer recurrence, but not routinely.   She is advised to continue annual screening mammogram, next due in 3/2025.   I recommended continue clinical surveillance with follow up clinical exam in 6 months.  She is advised to call me sooner with any concerns.    #. Bone health on aromatase inhibitor  #. Osteopenia     DEXA 10/23/24 showed continued osteopenia with interval decrease in bone density. Discussed options of continued surveillance vs starting annual infusions with Reclast for 3 years. She is interested in waiting for now. I recommended her to continue calcium/vitamin D and weightbearing exercises for bone health. She reports not doing as much weight bearing exercises as she used to, but is interested in improving this.  Recommended DEXA scan in one year to keep monitoring.     #. Weight gain   She reports increased weight and appetite on anastrozole. Discussed improving diet with fresh fruits and veggies with whole grains and protein. Also discussed increasing physical activity which she has decreased lately.     Encounter Diagnoses:    Problem List Items Addressed This Visit       Encounter for monitoring aromatase inhibitor therapy - Primary     Other Visit Diagnoses       Invasive ductal carcinoma of breast, stage 1, right (H)        Relevant Medications    anastrozole (ARIMIDEX) 1 MG tablet                 CC: Sammie Pérez MD   ______________________________________________________________________________  Diagnosis/ Treatment to date  6/2023- Invasive ductal carcinoma of the upper outer quadrant of the right breast.    Grade 2.   5.3 x 5 mm, Negative margins   There is associated DCIS with close margin.   pT1b (sn)N0   ER positive (%), MN positive (%), HER2 negative (1+ by IHC).      6/8/2023- right breast lumpectomy and right axillary sentinel lymph node biopsy (Dr. Marie)  7/25/2023-8/21/2023-5256 cGy in 20 treatments of adjuvant radiation  8/22/2023-initiated anastrozole    History of Present Illness    Ms. Nia Prieto presented today for follow-up accompanied by her .    She has been on anastrozole for over one year. The muscle aches on her thumbs and the bottoms of her feet have improved since stopping Lipitor 4/2024 but continue to bother her. She takes 500-1000mg of tylenol  "2x weekly which helps with the pain. She has been trying not to take Advil. She has become less physically active since then and has not used her weights or stationary bike as often. She continues on calcium and vitamin D.     She denies new symptoms. No new headaches or SOB. No new or worsening bone pain. No new lumps or bumps. She has been gaining weight since starting anastrozole and is frustrated with this.     Review of systems  Apart from describing in HPI, the remainder of comprehensive ROS was negative.    Past History    Past Medical History:   Diagnosis Date     Diabetes (H)      Ductal carcinoma in situ (DCIS) of breast with microinvasive component (H)     right     Eczema      Hypertension      Seasonal allergic rhinitis        Past Surgical History:   Procedure Laterality Date     CATARACT EXTRACTION Bilateral      COLONOSCOPY       EYE SURGERY       LAPAROSCOPIC CHOLECYSTECTOMY N/A 7/5/2023    Procedure: CHOLECYSTECTOMY, LAPAROSCOPIC;  Surgeon: Giorgi Stephens MD;  Location: [facility] OR     LUMPECTOMY BREAST Right 6/8/2023    Procedure: BREAST TAG LOCALIZED LUMPECTOMY WITH SENTINEL LYMPH NODE BIOPSY;  Surgeon: Lillian Marie DO;  Location: [facility]       Physical Exam    BP (!) 142/88 (BP Location: Left arm, Patient Position: Sitting, Cuff Size: Adult Large)   Pulse 73   Temp 97.2  F (36.2  C) (Tympanic)   Resp 16   Ht 1.613 m (5' 3.5\")   Wt 76.7 kg (169 lb 3.2 oz)   SpO2 96%   BMI 29.50 kg/m      General: alert, awake, not in acute distress  HEENT: Head: Normal, normocephalic, atraumatic.  Eye: Normal external eye, conjunctiva, lids cornea, MOY.  Neck / Thyroid: Supple, no masses, nodes, nodules or enlargement.  Lymphatics: No cervical, supraclavicular, or axillary lymphadenopathy.   Chest: Normal chest wall and respirations. Clear to auscultation.  Breasts: No palpable abnormalities. Some tenderness over right lumpectomy incision. No discrete masses. No dimpling or " discharge. No color changes.   Heart: S1 S2 RRR.   Abdomen: abdomen is soft without significant tenderness, masses, organomegaly or guarding  Extremities: normal strength, tone, and muscle mass. Mild ankle edema.   Skin: normal. no rash or abnormalities  CNS: non focal.    Lab Results    No results found for this or any previous visit (from the past week).      Imaging    DX Bone Density    Result Date: 10/23/2024  EXAM: DX TBS AXIAL LOCATION: [facility] DATE: 10/23/2024 INDICATION: BMD screening, follow-up. DEMOGRAPHICS: Age- 74 years. Gender- Female. Menopausal status- Postmenopausal. COMPARISON: 03/20/2023 TECHNIQUE: Dual-energy x-ray absorptiometry (DXA) performed with routine technique.  Trabecular bone score (TBS) analysis performed. FINDINGS: DXA RESULTS -Lumbar Spine: L2-L3: BMD: 1.130 g/cm2. T-score: -0.6. Z-score: 1.1. -RIGHT Hip Total: BMD: 0.854 g/cm2. T-score: -1.2. Z-score: 0.4. -RIGHT Hip Femoral neck: BMD: 0.806 g/cm2. T-score: -1.7. Z-score: 0.2. -LEFT Hip Total: BMD: 0.909 g/cm2. T-score: -0.8. Z-score: 0.8. -LEFT Hip Femoral neck: BMD: 0.816 g/cm2. T-score: -1.6. Z-score: 0.2. WHO T-SCORE CRITERIA -Normal: T score at or above -1 SD -Osteopenia: T score between -1 and -2.5 SD -Osteoporosis: T score at or below -2.5 SD The World Health Organization (WHO) criteria is applicable to perimenopausal females, postmenopausal females, and men aged 50 years or older. TBS RESULTS -Lumbar Spine L2-L3: TBS: 1.398. TBS T-score: -1.3.TBS Z-score: 1.4. The TBS is a DXA derived measurement for fracture risk assessment, and reflects the structural condition of the bone microarchitecture. It can be used to adjust WHO Fracture Risk Assessment Tool (FRAX) probability of fracture in postmenopausal women and older men. The calculated probabilities of fracture have been shown to be more accurate when computed with the TBS. INTERVAL CHANGE -There has been a 3.6% decrease in lumbar spine BMD. -There  "has been a 1.9% decrease in bilateral hip BMD. FRACTURE RISK -FRAX Results: The 10 year probability of major osteoporotic fracture is 17.4%, and of hip fracture is 3.2%, based on right femoral neck BMD. -TBS-adjusted FRAX Results: The 10 year probability of major osteoporotic fracture is 14.9%, and of hip fracture is 2.9%. RECOMMENDATIONS Consider treatment if major osteoporotic fracture score is greater than or equal to 20%, and if the hip fracture score is greater than or equal to 3%.     IMPRESSION: Low bone density (OSTEOPENIA). T score meets the WHO criteria for low bone density (osteopenia) at one or more measured sites. The risk of osteoporotic fracture increases approximately two-fold for each standard deviation decrease in T-score.     The longitudinal plan of care for the diagnosis(es)/condition(s) as documented were addressed during this visit. Due to the added complexity in care, I will continue to support Nia in the subsequent management and with ongoing continuity of care.     30 minutes spent by me on the date of the encounter doing chart review, history and exam, documentation and further activities as noted above.    Signed by: Lennie Herrera MD    Oncology Rooming Note    October 30, 2024 10:34 AM   Nia Prieto is a 74 year old female who presents for:    Chief Complaint   Patient presents with     Oncology Clinic Visit     Infiltrating ductal carcinoma of right breast     Initial Vitals: BP (!) 142/88 (BP Location: Left arm, Patient Position: Sitting, Cuff Size: Adult Large)   Pulse 73   Temp 97.2  F (36.2  C) (Tympanic)   Resp 16   Ht 1.613 m (5' 3.5\")   Wt 76.7 kg (169 lb 3.2 oz)   SpO2 96%   BMI 29.50 kg/m   Estimated body mass index is 29.5 kg/m  as calculated from the following:    Height as of this encounter: 1.613 m (5' 3.5\").    Weight as of this encounter: 76.7 kg (169 lb 3.2 oz). Body surface area is 1.85 meters squared.  Moderate Pain (5) Comment: Data Unavailable   No " LMP recorded. Patient is postmenopausal.  Allergies reviewed: Yes  Medications reviewed: Yes    Medications: MEDICATION REFILLS NEEDED TODAY. Provider was notified.  Pharm[facility] into [id number] [place] [address]    Frailty Screening:   Is the patient here for a new oncology consult visit in cancer care? 2. No      Clinical concerns:   Need refill on Anastrozole.   Bilateral hands and feet aches x6 months.       Maria Dolores Lewis MA                Again, thank you for allowing me to participate in the care of your patient.        Sincerely,        Lennie Herrera MD

## 2024-10-30 NOTE — PROGRESS NOTES
[facility] Hematology and Oncology Progress Note    Patient: Nia Prieto  MRN: [id number]  Date of Service: Oct 30, 2024         Reason for Visit    Chief Complaint   Patient presents with    Oncology Clinic Visit     Infiltrating ductal carcinoma of right breast       Assessment and Plan     Cancer Staging   Infiltrating ductal carcinoma of right breast (H)  Staging form: Breast, AJCC 8th Edition  - Pathologic stage from 6/22/2023: Stage IA (pT1b, pN0(sn), cM0, G2, ER+, CT+, HER2-) - Signed by Lennie Herrera MD on 7/10/2023      ECOG Performance    0 - Independent     Pain  Pain Score: Moderate Pain (5)  Pain Loc: Hand (bilateral hands and feet)    #.  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  Grade 2.  ER positive, CT positive, HER2 negative.    She is clinically well without signs and symptoms suggestive for breast cancer recurrence.  Mammogram from March 2024 was benign.  She tolerates current aromatase inhibitor therapy well without major intolerable side effects.  The achiness in her hands and feet continues but has improved since stopping Lipitor 4/2024.  Plan for adjuvant endocrine therapy is 5 years. She has been on it for over one year now.    She does not have any indication for systemic imaging.  I discussed that we will obtain imaging with clinical signs and symptoms concerning for breast cancer recurrence, but not routinely.   She is advised to continue annual screening mammogram, next due in 3/2025.   I recommended continue clinical surveillance with follow up clinical exam in 6 months.  She is advised to call me sooner with any concerns.    #. Bone health on aromatase inhibitor  #. Osteopenia    DEXA 10/23/24 showed continued osteopenia with interval decrease in bone density. Discussed options of continued surveillance vs starting annual infusions with Reclast for 3 years. She is interested in waiting for now. I recommended her to continue  calcium/vitamin D and weightbearing exercises for bone health. She reports not doing as much weight bearing exercises as she used to, but is interested in improving this.  Recommended DEXA scan in one year to keep monitoring.     #. Weight gain   She reports increased weight and appetite on anastrozole. Discussed improving diet with fresh fruits and veggies with whole grains and protein. Also discussed increasing physical activity which she has decreased lately.     Encounter Diagnoses:    Problem List Items Addressed This Visit       Encounter for monitoring aromatase inhibitor therapy - Primary     Other Visit Diagnoses       Invasive ductal carcinoma of breast, stage 1, right (H)        Relevant Medications    anastrozole (ARIMIDEX) 1 MG tablet                 CC: Sammie Pérez MD   ______________________________________________________________________________  Diagnosis/ Treatment to date  6/2023- Invasive ductal carcinoma of the upper outer quadrant of the right breast.    Grade 2.   5.3 x 5 mm, Negative margins   There is associated DCIS with close margin.   pT1b (sn)N0   ER positive (%), MT positive (%), HER2 negative (1+ by IHC).      6/8/2023- right breast lumpectomy and right axillary sentinel lymph node biopsy (Dr. Marie)  7/25/2023-8/21/2023-5256 cGy in 20 treatments of adjuvant radiation  8/22/2023-initiated anastrozole    History of Present Illness    Ms. Nia Prieto presented today for follow-up accompanied by her .    She has been on anastrozole for over one year. The muscle aches on her thumbs and the bottoms of her feet have improved since stopping Lipitor 4/2024 but continue to bother her. She takes 500-1000mg of tylenol 2x weekly which helps with the pain. She has been trying not to take Advil. She has become less physically active since then and has not used her weights or stationary bike as often. She continues on calcium and vitamin D.     She denies new symptoms.  "No new headaches or SOB. No new or worsening bone pain. No new lumps or bumps. She has been gaining weight since starting anastrozole and is frustrated with this.     Review of systems  Apart from describing in HPI, the remainder of comprehensive ROS was negative.    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)     right    Eczema     Hypertension     Seasonal allergic rhinitis        Past Surgical History:   Procedure Laterality Date    CATARACT EXTRACTION Bilateral     COLONOSCOPY      EYE SURGERY      LAPAROSCOPIC CHOLECYSTECTOMY N/A 7/5/2023    Procedure: CHOLECYSTECTOMY, LAPAROSCOPIC;  Surgeon: Giorgi Stephens MD;  Location: [facility] OR    LUMPECTOMY BREAST Right 6/8/2023    Procedure: BREAST TAG LOCALIZED LUMPECTOMY WITH SENTINEL LYMPH NODE BIOPSY;  Surgeon: Lillian Marie DO;  Location: [facility] OR       Physical Exam    BP (!) 142/88 (BP Location: Left arm, Patient Position: Sitting, Cuff Size: Adult Large)   Pulse 73   Temp 97.2  F (36.2  C) (Tympanic)   Resp 16   Ht 1.613 m (5' 3.5\")   Wt 76.7 kg (169 lb 3.2 oz)   SpO2 96%   BMI 29.50 kg/m      General: alert, awake, not in acute distress  HEENT: Head: Normal, normocephalic, atraumatic.  Eye: Normal external eye, conjunctiva, lids cornea, MOY.  Neck / Thyroid: Supple, no masses, nodes, nodules or enlargement.  Lymphatics: No cervical, supraclavicular, or axillary lymphadenopathy.   Chest: Normal chest wall and respirations. Clear to auscultation.  Breasts: No palpable abnormalities. Some tenderness over right lumpectomy incision. No discrete masses. No dimpling or discharge. No color changes.   Heart: S1 S2 RRR.   Abdomen: abdomen is soft without significant tenderness, masses, organomegaly or guarding  Extremities: normal strength, tone, and muscle mass. Mild ankle edema.   Skin: normal. no rash or abnormalities  CNS: non " focal.    Lab Results    No results found for this or any previous visit (from the past week).      Imaging    DX Bone Density    Result Date: 10/23/2024  EXAM: DX TBS AXIAL LOCATION: [facility] DATE: 10/23/2024 INDICATION: BMD screening, follow-up. DEMOGRAPHICS: Age- 74 years. Gender- Female. Menopausal status- Postmenopausal. COMPARISON: 03/20/2023 TECHNIQUE: Dual-energy x-ray absorptiometry (DXA) performed with routine technique.  Trabecular bone score (TBS) analysis performed. FINDINGS: DXA RESULTS -Lumbar Spine: L2-L3: BMD: 1.130 g/cm2. T-score: -0.6. Z-score: 1.1. -RIGHT Hip Total: BMD: 0.854 g/cm2. T-score: -1.2. Z-score: 0.4. -RIGHT Hip Femoral neck: BMD: 0.806 g/cm2. T-score: -1.7. Z-score: 0.2. -LEFT Hip Total: BMD: 0.909 g/cm2. T-score: -0.8. Z-score: 0.8. -LEFT Hip Femoral neck: BMD: 0.816 g/cm2. T-score: -1.6. Z-score: 0.2. WHO T-SCORE CRITERIA -Normal: T score at or above -1 SD -Osteopenia: T score between -1 and -2.5 SD -Osteoporosis: T score at or below -2.5 SD The World Health Organization (WHO) criteria is applicable to perimenopausal females, postmenopausal females, and men aged 50 years or older. TBS RESULTS -Lumbar Spine L2-L3: TBS: 1.398. TBS T-score: -1.3.TBS Z-score: 1.4. The TBS is a DXA derived measurement for fracture risk assessment, and reflects the structural condition of the bone microarchitecture. It can be used to adjust WHO Fracture Risk Assessment Tool (FRAX) probability of fracture in postmenopausal women and older men. The calculated probabilities of fracture have been shown to be more accurate when computed with the TBS. INTERVAL CHANGE -There has been a 3.6% decrease in lumbar spine BMD. -There has been a 1.9% decrease in bilateral hip BMD. FRACTURE RISK -FRAX Results: The 10 year probability of major osteoporotic fracture is 17.4%, and of hip fracture is 3.2%, based on right femoral neck BMD. -TBS-adjusted FRAX Results: The 10 year probability of major  osteoporotic fracture is 14.9%, and of hip fracture is 2.9%. RECOMMENDATIONS Consider treatment if major osteoporotic fracture score is greater than or equal to 20%, and if the hip fracture score is greater than or equal to 3%.     IMPRESSION: Low bone density (OSTEOPENIA). T score meets the WHO criteria for low bone density (osteopenia) at one or more measured sites. The risk of osteoporotic fracture increases approximately two-fold for each standard deviation decrease in T-score.     The longitudinal plan of care for the diagnosis(es)/condition(s) as documented were addressed during this visit. Due to the added complexity in care, I will continue to support Nia in the subsequent management and with ongoing continuity of care.     30 minutes spent by me on the date of the encounter doing chart review, history and exam, documentation and further activities as noted above.    Signed by: Lennie Herrera MD

## 2024-10-30 NOTE — PROGRESS NOTES
"Oncology Rooming Note    October 30, 2024 10:34 AM   Nia Prieto is a 74 year old female who presents for:    Chief Complaint   Patient presents with    Oncology Clinic Visit     Infiltrating ductal carcinoma of right breast     Initial Vitals: BP (!) 142/88 (BP Location: Left arm, Patient Position: Sitting, Cuff Size: Adult Large)   Pulse 73   Temp 97.2  F (36.2  C) (Tympanic)   Resp 16   Ht 1.613 m (5' 3.5\")   Wt 76.7 kg (169 lb 3.2 oz)   SpO2 96%   BMI 29.50 kg/m   Estimated body mass index is 29.5 kg/m  as calculated from the following:    Height as of this encounter: 1.613 m (5' 3.5\").    Weight as of this encounter: 76.7 kg (169 lb 3.2 oz). Body surface area is 1.85 meters squared.  Moderate Pain (5) Comment: Data Unavailable   No LMP recorded. Patient is postmenopausal.  Allergies reviewed: Yes  Medications reviewed: Yes    Medications: MEDICATION REFILLS NEEDED TODAY. Provider was notified.  Pharmacy name entered into [id number] [place] [address]    Frailty Screening:   Is the patient here for a new oncology consult visit in cancer care? 2. No      Clinical concerns:   Need refill on Anastrozole.   Bilateral hands and feet aches x6 months.       Maria Dolores Lewis MA              "

## 2024-11-05 ENCOUNTER — PATIENT OUTREACH (OUTPATIENT)
Dept: ONCOLOGY | Facility: HOSPITAL | Age: 74
End: 2024-11-05
Payer: MEDICARE

## 2024-11-05 NOTE — PROGRESS NOTES
[facility]: Cancer Care                                                                                          Situation: Patient chart reviewed by care coordinator.    Background: Patient has history of infiltrating ductal carcinoma of right breast, ER+, OK+, HER2-    Assessment: Patient saw Dr. Herrera in follow-up on 10/30/24. Patient taking and tolerating anastrozole.    Plan/Recommendations: Patient to follow up with clinical exam in 6 months, sooner with concerns.     Patient on 6 month recall list.     Signature:  Cindy Kent RN

## 2024-12-12 ENCOUNTER — OFFICE VISIT (OUTPATIENT)
Dept: URGENT CARE | Facility: URGENT CARE | Age: 74
End: 2024-12-12
Payer: MEDICARE

## 2024-12-12 VITALS
HEART RATE: 88 BPM | DIASTOLIC BLOOD PRESSURE: 86 MMHG | BODY MASS INDEX: 29.64 KG/M2 | WEIGHT: 170 LBS | SYSTOLIC BLOOD PRESSURE: 132 MMHG | RESPIRATION RATE: 14 BRPM | OXYGEN SATURATION: 98 % | TEMPERATURE: 98.3 F

## 2024-12-12 DIAGNOSIS — J02.0 STREPTOCOCCAL PHARYNGITIS: Primary | ICD-10-CM

## 2024-12-12 DIAGNOSIS — R07.0 THROAT PAIN: ICD-10-CM

## 2024-12-12 LAB — DEPRECATED S PYO AG THROAT QL EIA: POSITIVE

## 2024-12-12 RX ORDER — PENICILLIN V POTASSIUM 500 MG/1
500 TABLET, FILM COATED ORAL 2 TIMES DAILY
Qty: 20 TABLET | Refills: 0 | Status: SHIPPED | OUTPATIENT
Start: 2024-12-12 | End: 2024-12-22

## 2024-12-12 ASSESSMENT — ENCOUNTER SYMPTOMS
COUGH: 1
VOMITING: 0
SORE THROAT: 1
FEVER: 0
RHINORRHEA: 1

## 2024-12-12 NOTE — PROGRESS NOTES
Assessment & Plan:        ICD-10-CM    1. Streptococcal pharyngitis  J02.0 penicillin V (VEETID) 500 MG tablet      2. Throat pain  R07.0 Streptococcus A Rapid Screen w/Reflex to PCR - Clinic Collect            Plan/Clinical Decision Making:    Patient with URI symptoms with bad ST. Was improving and then worsening today.   Strep positive.   Will treat with course of penicillin.   Rest, fluids, Tylenol.       Return if symptoms worsen or fail to improve, for in 3-5 days.     At the end of the encounter, I discussed results, diagnosis, medications. Discussed red flags for immediate return to clinic/ER, as well as indications for follow up if no improvement. Patient understood and agreed to plan. Patient was stable for discharge.        Bhavya Salazar PA-C on 12/12/2024 at 3:03 PM          Subjective:     HPI:    Nia is a 74 year old female who presents to clinic today for the following health issues:  Chief Complaint   Patient presents with    Sick     Head congestion,Sore throat x 7 days -- its a little better   - took Tylenol, robitussin     HPI    Patient with mild URI symptoms. Bad ST several days ago. Worse again today.     Review of Systems   Constitutional:  Negative for fever.   HENT:  Positive for congestion (mild), rhinorrhea (mild) and sore throat.    Respiratory:  Positive for cough.    Gastrointestinal:  Negative for vomiting.         Patient Active Problem List   Diagnosis    Impaired fasting glucose    Infiltrating ductal carcinoma of right breast (H)    Encounter for monitoring aromatase inhibitor therapy    Vasovagal syncope        Past Medical History:   Diagnosis Date    Diabetes (H)     Ductal carcinoma in situ (DCIS) of breast with microinvasive component (H)     right    Eczema     Hypertension     Seasonal allergic rhinitis        Social History     Tobacco Use    Smoking status: Never    Smokeless tobacco: Never   Substance Use Topics    Alcohol use: Not Currently              Objective:     Vitals:    12/12/24 1428   BP: 132/86   BP Location: Right arm   Patient Position: Chair   Cuff Size: Adult Regular   Pulse: 88   Resp: 14   Temp: 98.3  F (36.8  C)   TempSrc: Oral   SpO2: 98%   Weight: 77.1 kg (170 lb)         Physical Exam   EXAM:   Pleasant, alert, appropriate appearance. NAD.  Head Exam: Normocephalic, atraumatic.  Eye Exam:   non icteric/injection.    Ear Exam: TMs grey without bulging. Normal canals.  Normal pinna.  Nose Exam: Normal external nose.    OroPharynx Exam:  Moist mucous membranes. No erythema, pharynx without exudate or hypertrophy.  Neck/Thyroid Exam:  No LAD.    Chest/Respiratory Exam: CTAB.  Cardiovascular Exam: RRR. No murmur or rubs.      Results:  Results for orders placed or performed in visit on 12/12/24   Streptococcus A Rapid Screen w/Reflex to PCR - Clinic Collect     Status: Abnormal    Specimen: Throat; Swab   Result Value Ref Range    Group A Strep antigen Positive (A) Negative

## 2025-02-20 ENCOUNTER — PATIENT OUTREACH (OUTPATIENT)
Dept: CARE COORDINATION | Facility: CLINIC | Age: 75
End: 2025-02-20
Payer: MEDICARE

## 2025-03-14 SDOH — HEALTH STABILITY: PHYSICAL HEALTH: ON AVERAGE, HOW MANY DAYS PER WEEK DO YOU ENGAGE IN MODERATE TO STRENUOUS EXERCISE (LIKE A BRISK WALK)?: 5 DAYS

## 2025-03-14 SDOH — HEALTH STABILITY: PHYSICAL HEALTH: ON AVERAGE, HOW MANY MINUTES DO YOU ENGAGE IN EXERCISE AT THIS LEVEL?: 40 MIN

## 2025-03-14 ASSESSMENT — SOCIAL DETERMINANTS OF HEALTH (SDOH): HOW OFTEN DO YOU GET TOGETHER WITH FRIENDS OR RELATIVES?: MORE THAN THREE TIMES A WEEK

## 2025-03-15 ENCOUNTER — HEALTH MAINTENANCE LETTER (OUTPATIENT)
Age: 75
End: 2025-03-15

## 2025-03-16 ENCOUNTER — PATIENT OUTREACH (OUTPATIENT)
Dept: CARE COORDINATION | Facility: CLINIC | Age: 75
End: 2025-03-16
Payer: MEDICARE

## 2025-03-19 ENCOUNTER — OFFICE VISIT (OUTPATIENT)
Dept: FAMILY MEDICINE | Facility: CLINIC | Age: 75
End: 2025-03-19
Attending: FAMILY MEDICINE
Payer: MEDICARE

## 2025-03-19 VITALS
HEIGHT: 64 IN | RESPIRATION RATE: 18 BRPM | DIASTOLIC BLOOD PRESSURE: 82 MMHG | WEIGHT: 168 LBS | OXYGEN SATURATION: 96 % | HEART RATE: 85 BPM | SYSTOLIC BLOOD PRESSURE: 134 MMHG | TEMPERATURE: 98.1 F | BODY MASS INDEX: 28.68 KG/M2

## 2025-03-19 DIAGNOSIS — Z00.00 MEDICARE ANNUAL WELLNESS VISIT, SUBSEQUENT: Primary | ICD-10-CM

## 2025-03-19 DIAGNOSIS — R73.01 IMPAIRED FASTING GLUCOSE: ICD-10-CM

## 2025-03-19 DIAGNOSIS — R30.0 DYSURIA: ICD-10-CM

## 2025-03-19 DIAGNOSIS — N95.2 VAGINAL ATROPHY: ICD-10-CM

## 2025-03-19 DIAGNOSIS — C50.911 INFILTRATING DUCTAL CARCINOMA OF RIGHT BREAST (H): ICD-10-CM

## 2025-03-19 DIAGNOSIS — R55 VASOVAGAL SYNCOPE: ICD-10-CM

## 2025-03-19 DIAGNOSIS — R09.89 OTHER SPECIFIED SYMPTOMS AND SIGNS INVOLVING THE CIRCULATORY AND RESPIRATORY SYSTEMS: ICD-10-CM

## 2025-03-19 DIAGNOSIS — Z12.11 SCREEN FOR COLON CANCER: ICD-10-CM

## 2025-03-19 LAB
ALBUMIN SERPL BCG-MCNC: 4.4 G/DL (ref 3.5–5.2)
ALBUMIN UR-MCNC: NEGATIVE MG/DL
ALP SERPL-CCNC: 67 U/L (ref 40–150)
ALT SERPL W P-5'-P-CCNC: 10 U/L (ref 0–50)
ANION GAP SERPL CALCULATED.3IONS-SCNC: 11 MMOL/L (ref 7–15)
APPEARANCE UR: CLEAR
AST SERPL W P-5'-P-CCNC: 22 U/L (ref 0–45)
BILIRUB SERPL-MCNC: 0.2 MG/DL
BILIRUB UR QL STRIP: NEGATIVE
BUN SERPL-MCNC: 13.8 MG/DL (ref 8–23)
CALCIUM SERPL-MCNC: 10.3 MG/DL (ref 8.8–10.4)
CHLORIDE SERPL-SCNC: 103 MMOL/L (ref 98–107)
COLOR UR AUTO: YELLOW
CREAT SERPL-MCNC: 0.84 MG/DL (ref 0.51–0.95)
EGFRCR SERPLBLD CKD-EPI 2021: 73 ML/MIN/1.73M2
ERYTHROCYTE [DISTWIDTH] IN BLOOD BY AUTOMATED COUNT: 12.9 % (ref 10–15)
EST. AVERAGE GLUCOSE BLD GHB EST-MCNC: 108 MG/DL
GLUCOSE SERPL-MCNC: 106 MG/DL (ref 70–99)
GLUCOSE UR STRIP-MCNC: NEGATIVE MG/DL
HBA1C MFR BLD: 5.4 % (ref 0–5.6)
HCO3 SERPL-SCNC: 27 MMOL/L (ref 22–29)
HCT VFR BLD AUTO: 40.3 % (ref 35–47)
HGB BLD-MCNC: 13.3 G/DL (ref 11.7–15.7)
HGB UR QL STRIP: NEGATIVE
KETONES UR STRIP-MCNC: NEGATIVE MG/DL
LEUKOCYTE ESTERASE UR QL STRIP: NEGATIVE
MAGNESIUM SERPL-MCNC: 2.2 MG/DL (ref 1.7–2.3)
MCH RBC QN AUTO: 29.7 PG (ref 26.5–33)
MCHC RBC AUTO-ENTMCNC: 33 G/DL (ref 31.5–36.5)
MCV RBC AUTO: 90 FL (ref 78–100)
NITRATE UR QL: NEGATIVE
PH UR STRIP: 7 [PH] (ref 5–8)
PLATELET # BLD AUTO: 234 10E3/UL (ref 150–450)
POTASSIUM SERPL-SCNC: 4.8 MMOL/L (ref 3.4–5.3)
PROT SERPL-MCNC: 7.8 G/DL (ref 6.4–8.3)
RBC # BLD AUTO: 4.48 10E6/UL (ref 3.8–5.2)
SODIUM SERPL-SCNC: 141 MMOL/L (ref 135–145)
SP GR UR STRIP: 1.01 (ref 1–1.03)
TSH SERPL DL<=0.005 MIU/L-ACNC: 3.06 UIU/ML (ref 0.3–4.2)
UROBILINOGEN UR STRIP-ACNC: 0.2 E.U./DL
WBC # BLD AUTO: 5.2 10E3/UL (ref 4–11)

## 2025-03-19 PROCEDURE — 85027 COMPLETE CBC AUTOMATED: CPT | Performed by: FAMILY MEDICINE

## 2025-03-19 PROCEDURE — 36415 COLL VENOUS BLD VENIPUNCTURE: CPT | Performed by: FAMILY MEDICINE

## 2025-03-19 PROCEDURE — 83036 HEMOGLOBIN GLYCOSYLATED A1C: CPT | Performed by: FAMILY MEDICINE

## 2025-03-19 ASSESSMENT — PAIN SCALES - GENERAL: PAINLEVEL_OUTOF10: NO PAIN (0)

## 2025-03-19 NOTE — PROGRESS NOTES
Preventive Care Visit  [facility]  Sammie Pérez MD, Family Medicine  Mar 19, 2025  {Provider  Link to SmartSet :648137}    {PROVIDER CHARTING PREFERENCE:184170}    Jd Kramer is a 74 year old, presenting for the following:  Wellness Visit (fasting), Vasovagal syncope (Has questions), and vaginal irritation (At times has vaginal discomfort and itching)    {(!) Visit Details have not yet been documented.  Please enter Visit Details and then use this list to pull in documentation. (Optional):929339}  {ROOMER if patient is in their first year of Medicare a vision screen is required click here to document the Vison screen and then refresh the note to pull in results  :811355}      HPI  ***   {MA/LPN/RN Pre-Provider Visit Orders- hCG/UA/Strep (Optional):075517}  {SUPERLIST (Optional):850532}  {additonal problems for provider to add (Optional):118494}  Advance Care Planning  Patient has a Health Care Directive on file  Advance care planning document is on file and is current.      3/14/2025   General Health   How would you rate your overall physical health? Excellent   Feel stress (tense, anxious, or unable to sleep) Not at all         3/14/2025   Nutrition   Diet: Regular (no restrictions)         3/14/2025   Exercise   Days per week of moderate/strenous exercise 5 days   Average minutes spent exercising at this level 40 min         3/14/2025   Social Factors   Frequency of gathering with friends or relatives More than three times a week   Worry food won't last until get money to buy more No   Food not last or not have enough money for food? No   Do you have housing? (Housing is defined as stable permanent housing and does not include staying ouside in a car, in a tent, in an abandoned building, in an overnight shelter, or couch-surfing.) No   Are you worried about losing your housing? No   Lack of transportation? No   Unable to get utilities (heat,electricity)? No   Want help with  housing or utility concern? No   (!) HOUSING CONCERN PRESENT      3/14/2025   Fall Risk   Fallen 2 or more times in the past year? No   Trouble with walking or balance? No          3/14/2025   Activities of Daily Living- Home Safety   Needs help with the following daily activites None of the above   Safety concerns in the home None of the above         3/14/2025   Dental   Dentist two times every year? Yes         3/14/2025   Hearing Screening   Hearing concerns? None of the above         3/14/2025   Driving Risk Screening   Patient/family members have concerns about driving No         3/14/2025   General Alertness/Fatigue Screening   Have you been more tired than usual lately? No         3/14/2025   Urinary Incontinence Screening   Bothered by leaking urine in past 6 months No           3/17/2024   TB Screening   Were you born outside of the [place]? No           Today's PHQ-2 Score:       3/18/2025    11:37 AM   PHQ-2 ( 1999 Pfizer)   Q1: Little interest or pleasure in doing things 0   Q2: Feeling down, depressed or hopeless 0   PHQ-2 Score 0    Q1: Little interest or pleasure in doing things Not at all   Q2: Feeling down, depressed or hopeless Not at all   PHQ-2 Score 0       Patient-reported           3/14/2025   Substance Use   Alcohol more than 3/day or more than 7/wk Not Applicable   Do you have a current opioid prescription? No   How severe/bad is pain from 1 to 10? 0/10 (No Pain)   Do you use any other substances recreationally? No     Social History     Tobacco Use    Smoking status: Never    Smokeless tobacco: Never   Vaping Use    Vaping status: Never Used   Substance Use Topics    Alcohol use: Not Currently    Drug use: Never     {Provider  If there are gaps in the social history shown above, please follow the link to update and then refresh the note Link to Social and Substance History :108156}      3/27/2024   LAST FHS-7 RESULTS   1st degree relative breast or ovarian cancer No   Any relative bilateral  breast cancer No   Any male have breast cancer No   Any ONE woman have BOTH breast AND ovarian cancer No   Any woman with breast cancer before 50yrs No   2 or more relatives with breast AND/OR ovarian cancer No   2 or more relatives with breast AND/OR bowel cancer No     {If any of the questions to the FHS7 are answered yes, consider referral for genetic counseling.    Additional indications for genetic referral include personal history of breast or ovarian cancer, genetic mutation in 1st degree relative which increases risk of breast cancer including BRCA1, BRCA2, KAELA, PALB 2, TP53, CHEK2, PTEN, CDH1, STK11 (per ACS) and/or 1st degree relative with history of pancreatic or high-risk prostate cancer (per NCCN):443766}   {Mammogram Decision Support (Optional):917339}    ASCVD Risk   The 10-year ASCVD risk score (Cintia ARIAS, et al., 2019) is: 14.3%    Values used to calculate the score:      Age: 74 years      Sex: Female      Is Non- : No      Diabetic: No      Tobacco smoker: No      Systolic Blood Pressure: 134 mmHg      Is BP treated: No      HDL Cholesterol: 82 mg/dL      Total Cholesterol: 242 mg/dL    {Link to Fracture Risk Assessment Tool (Optional):740078}    {Provider  REQUIRED FOR AWV Use the storyboard to review patient history, after sections have been marked as reviewed, refresh note to capture documentation:930647}  {Provider   REQUIRED AWV use this link to review and update sexual activity history  after section has been marked as reviewed, refresh note to capture documentation:867437}  Reviewed and updated as needed this visit by Provider                    {HISTORY OPTIONS (Optional):037251}  Current providers sharing in care for this patient include:  Patient Care Team:  Sammie Pérez MD as PCP - General (Family Practice)  Sammie Pérez MD as Assigned PCP  Lillian Marie DO as Assigned Surgical Provider  Tati Wright MD as MD (Radiation Oncology)  Lennie Herrera  "MD Lennie as Assigned Cancer Care Provider    The following health maintenance items are reviewed in Epic and correct as of today:  Health Maintenance   Topic Date Due    DTAP/TDAP/TD IMMUNIZATION (2 - Td or Tdap) 02/12/2025    COVID-19 Vaccine (8 - Pfizer risk 2024-25 season) 03/16/2025    ANNUAL REVIEW OF HM ORDERS  03/18/2025    MEDICARE ANNUAL WELLNESS VISIT  03/18/2025    MAMMO SCREENING  03/27/2025    COLORECTAL CANCER SCREENING  04/15/2025    FALL RISK ASSESSMENT  03/19/2026    DEXA  10/23/2026    DIABETES SCREENING  03/27/2027    LIPID  03/18/2029    ADVANCE CARE PLANNING  03/18/2029    HEPATITIS C SCREENING  Completed    PHQ-2 (once per calendar year)  Completed    INFLUENZA VACCINE  Completed    Pneumococcal Vaccine: 50+ Years  Completed    ZOSTER IMMUNIZATION  Completed    RSV VACCINE  Completed    HPV IMMUNIZATION  Aged Out    MENINGITIS IMMUNIZATION  Aged Out    RSV MONOCLONAL ANTIBODY  Discontinued       {ROS Picklists (Optional):799937}     Objective    Exam  /82   Pulse 85   Temp 98.1  F (36.7  C) (Oral)   Resp 18   Ht 1.613 m (5' 3.5\")   Wt 76.2 kg (168 lb)   SpO2 96%   BMI 29.29 kg/m     Estimated body mass index is 29.29 kg/m  as calculated from the following:    Height as of this encounter: 1.613 m (5' 3.5\").    Weight as of this encounter: 76.2 kg (168 lb).    Physical Exam  {Exam Choices (Optional):338257}        3/19/2025   Mini Cog   Clock Draw Score 2 Normal   3 Item Recall 3 objects recalled   Mini Cog Total Score 5     {A Mini-Cog total score of 0-2 suggests the possibility of dementia, score of 3-5 suggests no dementia:039037}         Signed Electronically by: Sammie Pérez MD  {Email feedback regarding this note to [email or web address]   :152744}  "

## 2025-03-19 NOTE — PATIENT INSTRUCTIONS
I recommend receiving a COVID booster (it has been 6 months since your last vaccine) and a Tdap booster.    I placed an order for an echocardiogram to ensure your heart structure and function is normal in light of your episodes of passing out.  We will also check your thyroid, electrolytes, blood counts.    I recommend trial of Replens vaginal moisturizer, available over-the-counter.  If this is not adequate in helping with your external vaginal irritation, let me know.  Because I see a small whitened area near the opening to her vagina, I recommend having this evaluated by gynecology.  I recommend [facility] ([place] called [facility]).  Patient Education   Preventive Care Advice   This is general advice given by our system to help you stay healthy. However, your care team may have specific advice just for you. Please talk to your care team about your preventive care needs.  Nutrition  Eat 5 or more servings of fruits and vegetables each day.  Try wheat bread, brown rice and whole grain pasta (instead of white bread, rice, and pasta).  Get enough calcium and vitamin D. Check the label on foods and aim for 100% of the RDA (recommended daily allowance).  Lifestyle  Exercise at least 150 minutes each week  (30 minutes a day, 5 days a week).  Do muscle strengthening activities 2 days a week. These help control your weight and prevent disease.  No smoking.  Wear sunscreen to prevent skin cancer.  Have a dental exam and cleaning every 6 months.  Yearly exams  See your health care team every year to talk about:  Any changes in your health.  Any medicines your care team has prescribed.  Preventive care, family planning, and ways to prevent chronic diseases.  Shots (vaccines)   HPV shots (up to age 26), if you've never had them before.  Hepatitis B shots (up to age 59), if you've never had them before.  COVID-19 shot: Get this shot when it's due.  Flu shot: Get a flu shot every year.  Tetanus shot: Get a  tetanus shot every 10 years.  Pneumococcal, hepatitis A, and RSV shots: Ask your care team if you need these based on your risk.  Shingles shot (for age 50 and up)  General health tests  Diabetes screening:  Starting at age 35, Get screened for diabetes at least every 3 years.  If you are younger than age 35, ask your care team if you should be screened for diabetes.  Cholesterol test: At age 39, start having a cholesterol test every 5 years, or more often if advised.  Bone density scan (DEXA): At age 50, ask your care team if you should have this scan for osteoporosis (brittle bones).  Hepatitis C: Get tested at least once in your life.  STIs (sexually transmitted infections)  Before age 24: Ask your care team if you should be screened for STIs.  After age 24: Get screened for STIs if you're at risk. You are at risk for STIs (including HIV) if:  You are sexually active with more than one person.  You don't use condoms every time.  You or a partner was diagnosed with a sexually transmitted infection.  If you are at risk for HIV, ask about PrEP medicine to prevent HIV.  Get tested for HIV at least once in your life, whether you are at risk for HIV or not.  Cancer screening tests  Cervical cancer screening: If you have a cervix, begin getting regular cervical cancer screening tests starting at age 21.  Breast cancer scan (mammogram): If you've ever had breasts, begin having regular mammograms starting at age 40. This is a scan to check for breast cancer.  Colon cancer screening: It is important to start screening for colon cancer at age 45.  Have a colonoscopy test every 10 years (or more often if you're at risk) Or, ask your provider about stool tests like a FIT test every year or Cologuard test every 3 years.  To learn more about your testing options, visit:   .  For help making a decision, visit:   https://bit.ly/xd46372.  Prostate cancer screening test: If you have a prostate, ask your care team if a prostate  cancer screening test (PSA) at age 55 is right for you.  Lung cancer screening: If you are a current or former smoker ages 50 to 80, ask your care team if ongoing lung cancer screenings are right for you.  For informational purposes only. Not to replace the advice of your health care provider. Copyright   2023 [employer]. All rights reserved. Clinically reviewed by the [facility] Transitions Program. VersionOne 903846 - REV 01/24.

## 2025-03-28 ENCOUNTER — HOSPITAL ENCOUNTER (OUTPATIENT)
Dept: MAMMOGRAPHY | Facility: CLINIC | Age: 75
Discharge: HOME | End: 2025-03-28
Attending: FAMILY MEDICINE | Admitting: FAMILY MEDICINE
Payer: MEDICARE

## 2025-03-28 DIAGNOSIS — Z12.31 VISIT FOR SCREENING MAMMOGRAM: ICD-10-CM

## 2025-03-28 PROCEDURE — 77063 BREAST TOMOSYNTHESIS BI: CPT

## 2025-04-01 ENCOUNTER — DOCUMENTATION ONLY (OUTPATIENT)
Dept: OTHER | Facility: CLINIC | Age: 75
End: 2025-04-01
Payer: MEDICARE

## 2025-05-06 ENCOUNTER — TRANSFERRED RECORDS (OUTPATIENT)
Dept: HEALTH INFORMATION MANAGEMENT | Facility: CLINIC | Age: 75
End: 2025-05-06
Payer: MEDICARE

## 2025-05-08 ENCOUNTER — ONCOLOGY VISIT (OUTPATIENT)
Dept: ONCOLOGY | Facility: HOSPITAL | Age: 75
End: 2025-05-08
Attending: SURGERY
Payer: MEDICARE

## 2025-05-08 VITALS
RESPIRATION RATE: 16 BRPM | DIASTOLIC BLOOD PRESSURE: 76 MMHG | TEMPERATURE: 98 F | OXYGEN SATURATION: 98 % | SYSTOLIC BLOOD PRESSURE: 120 MMHG | HEART RATE: 80 BPM

## 2025-05-08 DIAGNOSIS — Z79.811 ENCOUNTER FOR MONITORING AROMATASE INHIBITOR THERAPY: ICD-10-CM

## 2025-05-08 DIAGNOSIS — Z51.81 ENCOUNTER FOR MONITORING AROMATASE INHIBITOR THERAPY: ICD-10-CM

## 2025-05-08 DIAGNOSIS — C50.911 INFILTRATING DUCTAL CARCINOMA OF RIGHT BREAST (H): ICD-10-CM

## 2025-05-08 DIAGNOSIS — M81.0 AGE-RELATED OSTEOPOROSIS WITHOUT CURRENT PATHOLOGICAL FRACTURE: Primary | ICD-10-CM

## 2025-05-08 PROCEDURE — 99214 OFFICE O/P EST MOD 30 MIN: CPT | Performed by: INTERNAL MEDICINE

## 2025-05-08 PROCEDURE — G0463 HOSPITAL OUTPT CLINIC VISIT: HCPCS | Performed by: INTERNAL MEDICINE

## 2025-05-08 PROCEDURE — G2211 COMPLEX E/M VISIT ADD ON: HCPCS | Performed by: INTERNAL MEDICINE

## 2025-05-08 RX ORDER — DIPHENHYDRAMINE HYDROCHLORIDE 50 MG/ML
50 INJECTION, SOLUTION INTRAMUSCULAR; INTRAVENOUS
Start: 2025-05-08

## 2025-05-08 RX ORDER — MEPERIDINE HYDROCHLORIDE 25 MG/ML
25 INJECTION INTRAMUSCULAR; INTRAVENOUS; SUBCUTANEOUS
OUTPATIENT
Start: 2025-05-08

## 2025-05-08 RX ORDER — METHYLPREDNISOLONE SODIUM SUCCINATE 40 MG/ML
40 INJECTION INTRAMUSCULAR; INTRAVENOUS
Start: 2025-05-08

## 2025-05-08 RX ORDER — ALBUTEROL SULFATE 90 UG/1
1-2 INHALANT RESPIRATORY (INHALATION)
Start: 2025-05-08

## 2025-05-08 RX ORDER — EPINEPHRINE 1 MG/ML
0.3 INJECTION, SOLUTION INTRAMUSCULAR; SUBCUTANEOUS EVERY 5 MIN PRN
OUTPATIENT
Start: 2025-05-08

## 2025-05-08 RX ORDER — ALBUTEROL SULFATE 0.83 MG/ML
2.5 SOLUTION RESPIRATORY (INHALATION)
OUTPATIENT
Start: 2025-05-08

## 2025-05-08 RX ORDER — ZOLEDRONIC ACID 0.05 MG/ML
5 INJECTION, SOLUTION INTRAVENOUS ONCE
Start: 2025-05-08

## 2025-05-08 RX ORDER — HEPARIN SODIUM,PORCINE 10 UNIT/ML
5-20 VIAL (ML) INTRAVENOUS DAILY PRN
OUTPATIENT
Start: 2025-05-08

## 2025-05-08 RX ORDER — DIPHENHYDRAMINE HYDROCHLORIDE 50 MG/ML
25 INJECTION, SOLUTION INTRAMUSCULAR; INTRAVENOUS
Start: 2025-05-08

## 2025-05-08 RX ORDER — HEPARIN SODIUM (PORCINE) LOCK FLUSH IV SOLN 100 UNIT/ML 100 UNIT/ML
5 SOLUTION INTRAVENOUS
OUTPATIENT
Start: 2025-05-08

## 2025-05-08 ASSESSMENT — PAIN SCALES - GENERAL: PAINLEVEL_OUTOF10: NO PAIN (0)

## 2025-05-08 NOTE — LETTER
Independent     Pain  Pain Score: No Pain (0)    #.  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  Grade 2.  ER positive, WV positive, HER2 negative.     She is now tolerating anastrozole okay.  She agreed to continue.    #. Osteopenia   - Reviewed DEXA scan results.  Bone density has declined slightly compared to the previous year.  - Consideration for Reclast infusion to stabilize or improve bone density.  - Discussed treatment options: Reclast infusion once a year for 3 years, or twice a year for high-risk features.    #. Achiness in the feet  - Likely attributed to anastrozole, as it is typical for symptoms to improve with movement.    #. Cardiovascular risk  - Low, as indicated by an excellent calcium score.      Plan  - Monitor symptoms and continue current management with anastrozole daily..  - Schedule Reclast infusion at a convenient time, possibly after colonoscopy and dental appointment.  - Take calcium and vitamin D regularly to support bone health.  - Take Tylenol before Reclast infusion and as needed for 24 hours post-infusion to manage potential flu-like symptoms.  - Monitor for flu-like symptoms after the first Reclast infusion, as they are common.  - Schedule follow-up check-ups every six months.     Encounter Diagnoses:    Problem List Items Addressed This Visit          Oncology Diagnoses    Infiltrating ductal carcinoma of right breast (H)       Other    Encounter for monitoring aromatase inhibitor therapy    Age-related osteoporosis without current pathological fracture - Primary            CC: Sammie Pérez MD   ______________________________________________________________________________  Diagnosis/ Treatment to date  6/2023- Invasive ductal carcinoma of the upper outer quadrant of the right breast.    Grade 2.   5.3 x 5 mm, Negative margins   There is associated DCIS with close margin.   pT1b (sn)N0   ER positive (%), WV positive (%),  HER2 negative (1+ by IHC).      6/8/2023- right breast lumpectomy and right axillary sentinel lymph node biopsy (Dr. Marie)  7/25/2023-8/21/2023-5256 cGy in 20 treatments of adjuvant radiation  8/22/2023-initiated anastrozole    History of Present Illness    History of Present Illness-  Nia Prieto, a 74-year-old female, reports having had a previous adverse reaction to a statin medication, which led to its discontinuation. She experienced significant side effects from the statin, but these have since resolved. She mentions a current mild achiness, which she attributes to anastrozole, noting that the discomfort is primarily in the bottom of her feet and improves with movement.    She shares that her calcium score was excellent, indicating a low cardiovascular risk, and as a result, the statin was deemed unnecessary. She feels that she is now back on track health-de [place]. Nia is scheduled for a colonoscopy in two weeks and an electrocardiogram next week, as she sometimes experiences episodes of feeling hot and unwell, prompting her doctor to check her heart.    She recently had a mammogram, which she reports as looking great. She is currently taking calcium and vitamin D supplements. During a recent visit to her gynecologist, she noticed a weight fluctuation, being about two pounds lighter than expected, which she attributes to normal variance between different scales.    Nia expresses interest in receiving a bone density infusion, having discussed it previously but initially hesitated. She has since decided to proceed with it. She confirms that her dental check-up is up to date, which is relevant for the planned infusion.     Review of systems  Apart from describing in HPI, the remainder of comprehensive ROS was negative.    Past History    Past Medical History:   Diagnosis Date     Cancer (H) 3/2023     Diabetes (H)      Ductal carcinoma in situ (DCIS) of breast with microinvasive component (H)      right     Eczema      Hypertension      Seasonal allergic rhinitis        Past Surgical History:   Procedure Laterality Date     CATARACT EXTRACTION Bilateral      COLONOSCOPY       EYE SURGERY       LAPAROSCOPIC CHOLECYSTECTOMY N/A 2023    Procedure: CHOLECYSTECTOMY, LAPAROSCOPIC;  Surgeon: Giorgi Stephens MD;  Location: [facility] OR     LUMPECTOMY BREAST Right 2023    Procedure: BREAST TAG LOCALIZED LUMPECTOMY WITH SENTINEL LYMPH NODE BIOPSY;  Surgeon: Lillian Marie DO;  Location: [facility] OR       Physical Exam    /76 (Patient Position: Sitting)   Pulse 80   Temp 98  F (36.7  C) (Oral)   Resp 16   SpO2 98%     General: alert, awake, not in acute distress  HEENT: Head: Normal, normocephalic, atraumatic.  Eye: Normal external eye, conjunctiva, lids cornea, MOY.  Pharynx: Normal buccal mucosa. Normal pharynx.  Neck / Thyroid: Supple, no masses, nodes, nodules or enlargement.  Lymphatics: No abnormally enlarged lymph nodes.  Chest: Normal chest wall and respirations. Clear to auscultation.  Breasts: Unremarkable  Heart: S1 S2 RRR.   Abdomen: abdomen is soft without significant tenderness, masses, organomegaly or guarding  Extremities: normal strength, tone, and muscle mass  Skin: normal. no rash or abnormalities  CNS: non focal.    Lab Results    Recent Results (from the past week)   Echocardiogram Complete   Result Value Ref Range    LVEF  60-65%          Imaging    Echocardiogram Complete  Result Date: 5/15/2025  [id number]^[place] [id number]  Name: EFRAIN NOVA MRN: [id number] : 1950 Study Date: 05/15/2025 11:05 AM Age: 74 yrs Gender: Female Patient Location: [facility] Reason For Study: Vasovagal syncope Ordering Physician: PAMELA CALIX Referring Physician: PAMELA CALIX Performed By: DELMAR  BSA: 1.8 m2 Height: 63 in Weight: 168 lb HR: 79 BP: 155/91 mmHg  ______________________________________________________________________________ Procedure Echocardiogram with two-dimensional, color and spectral Doppler. Adequate quality two-dimensional was performed and interpreted. There is no comparison study available. ______________________________________________________________________________ Interpretation Summary  The left ventricle is normal in size. There is normal left ventricular wall thickness. Left ventricular systolic function is normal. The visual ejection fraction is 60-65%. No regional wall motion abnormalities noted. Left ventricular diastolic function is normal.  The right ventricle is normal in size and function. Normal left atrial size. Right atrial size is normal. There is mitral annular disjunction measuring 0.9 cm. Right ventricle systolic pressure estimate normal IVC diameter <2.1 cm collapsing >50% with sniff suggests a normal RA pressure of 3 mmHg. There is no comparison study available. ______________________________________________________________________________ Left Ventricle The left ventricle is normal in size. There is normal left ventricular wall thickness. Left ventricular systolic function is normal. The visual ejection fraction is 60-65%. Left ventricular diastolic function is normal. No regional wall motion abnormalities noted.  Right Ventricle The right ventricle is normal in size and function.  Atria Normal left atrial size. Right atrial size is normal.  Mitral Valve Mitral valve leaflets appear normal. There is mitral annular disjunction measuring 0.9 cm. There is trace mitral regurgitation. There is no mitral valve stenosis.  Tricuspid Valve Tricuspid valve leaflets appear normal. There is trace tricuspid regurgitation. Right ventricle systolic pressure estimate normal.  Aortic Valve The aortic valve is trileaflet. Aortic valve leaflets appear normal. No aortic regurgitation is present. No aortic stenosis is present.  Pulmonic Valve The  pulmonic valve is not well visualized. There is trace pulmonic valvular regurgitation.  Vessels The aorta root is normal. IVC diameter <2.1 cm collapsing >50% with sniff suggests a normal RA pressure of 3 mmHg.  Pericardium There is no pericardial effusion.  Rhythm Sinus rhythm was noted. ______________________________________________________________________________ MMode/2D Measurements & Calculations IVSd: 0.60 cm  LVIDd: 4.2 cm LVIDs: 2.7 cm LVPWd: 0.68 cm FS: 36.3 % LV mass(C)d: 76.5 grams LV mass(C)dI: 42.6 grams/m2 Ao root diam: 3.2 cm LA dimension: 2.6 cm asc Aorta Diam: 3.3 cm LA/Ao: 0.81 LVOT diam: 2.0 cm LVOT area: 3.1 cm2 Ao root diam index Ht(cm/m): 2.0 Ao root diam index BSA (cm/m2): 1.8 Asc Ao diam index BSA (cm/m2): 1.8 Asc Ao diam index Ht(cm/m): 2.1 EF Biplane: 63.1 %  LA Volume Indexed (AL/bp): 9.8 ml/m2 RV Base: 3.4 cm RWT: 0.32 TAPSE: 2.0 cm  Doppler Measurements & Calculations MV E max asael: 64.7 cm/sec MV A max asael: 79.0 cm/sec MV E/A: 0.82 MV dec time: 0.18 sec Ao V2 max: 124.0 cm/sec Ao max P.0 mmHg Ao V2 mean: 86.0 cm/sec Ao mean PG: 3.0 mmHg Ao V2 VTI: 25.0 cm TRACEE(I,D): 2.1 cm2 TRACEE(V,D): 2.2 cm2 LV V1 max PG: 3.1 mmHg LV V1 max: 87.5 cm/sec LV V1 VTI: 16.4 cm SV(LVOT): 51.5 ml SI(LVOT): 28.7 ml/m2 PA acc time: 0.11 sec TR max asael: 217.0 cm/sec TR max P.8 mmHg Pulm Sys Asael: 60.3 cm/sec Pulm Clement Asael: 48.7 cm/sec Pulm A Revs Asael: 70.7 cm/sec Pulm S/D: 1.2 AV Asael Ratio (DI): 0.71  TRACEE Index (cm2/m2): 1.1 E/E': 6.8 E/E' av.9 Lateral E/e': 5.1 Medial E/e': 6.8 Peak E' Asael: 9.5 cm/sec RV S Asael: 14.9 cm/sec  ______________________________________________________________________________ Report approved by: Sanchez Momin MD on 05/15/2025 02:03 PM       The longitudinal plan of care for the diagnosis(es)/condition(s) as documented were addressed during this visit. Due to the added complexity in care, I will continue to support Nia in the subsequent management and with ongoing  continuity of care.     30 minutes spent by me on the date of the encounter doing chart review, history and exam, documentation and further activities as noted above.    Consent was obtained from the patient to use an AI documentation tool in the creation of this note.    Signed by: Lennie Herrera MD    Again, thank you for allowing me to participate in the care of your patient.        Sincerely,        Lennie Herrera MD    Electronically signed

## 2025-05-08 NOTE — PROGRESS NOTES
"Oncology Rooming Note    May 8, 2025 9:57 AM   Nia Prieto is a 74 year old female who presents for:    Chief Complaint   Patient presents with    Oncology Clinic Visit     Infiltrating ductal carcinoma of right breast.        Initial Vitals: /76 (Patient Position: Sitting)   Pulse 80   Temp 98  F (36.7  C) (Oral)   Resp 16   SpO2 98%  Estimated body mass index is 29.29 kg/m  as calculated from the following:    Height as of 3/19/25: 1.613 m (5' 3.5\").    Weight as of 3/19/25: 76.2 kg (168 lb). There is no height or weight on file to calculate BSA.  No Pain (0) Comment: Data Unavailable   No LMP recorded. Patient is postmenopausal.  Allergies reviewed: Yes  Medications reviewed: Yes    Medications: MEDICATION REFILLS NEEDED TODAY. Provider was notified.  Pharmacy name entered into [employer]: [id number] [place] [address]    Frailty Screening:   Is the patient here for a new oncology consult visit in cancer care? 2. No    PHQ9:  Did this patient require a PHQ9?: No      Clinical concerns:  No concerns today        Kolby Dawson LPN            "

## 2025-05-08 NOTE — Clinical Note
"5/8/2025      Nia Prieto  [address] [facility] [address]      Dear Colleague,    Thank you for referring your patient, Nia Prieto, to the [facility]. Please see a copy of my visit note below.    Oncology Rooming Note    May 8, 2025 9:57 AM   Nia Prieto is a 74 year old female who presents for:    Chief Complaint   Patient presents with    Oncology Clinic Visit     Infiltrating ductal carcinoma of right breast.        Initial Vitals: /76 (Patient Position: Sitting)   Pulse 80   Temp 98  F (36.7  C) (Oral)   Resp 16   SpO2 98%  Estimated body mass index is 29.29 kg/m  as calculated from the following:    Height as of 3/19/25: 1.613 m (5' 3.5\").    Weight as of 3/19/25: 76.2 kg (168 lb). There is no height or weight on file to calculate BSA.  No Pain (0) Comment: Data Unavailable   No LMP recorded. Patient is postmenopausal.  Allergies reviewed: Yes  Medications reviewed: Yes    Medications: MEDICATION REFILLS NEEDED TODAY. Provider was notified.  Pharmacy name entered into [employer]: [id number] [place], [place] - [address] [facility]    Frailty Screening:   Is the patient here for a new oncology consult visit in cancer care? 2. No    PHQ9:  Did this patient require a PHQ9?: No      Clinical concerns:  No concerns today        Kolby Dawson LPN                Again, thank you for allowing me to participate in the care of your patient.        Sincerely,        Lennie Herrera MD    Electronically signed"

## 2025-05-15 ENCOUNTER — HOSPITAL ENCOUNTER (OUTPATIENT)
Dept: CARDIOLOGY | Facility: CLINIC | Age: 75
End: 2025-05-15
Attending: FAMILY MEDICINE
Payer: MEDICARE

## 2025-05-15 ENCOUNTER — RESULTS FOLLOW-UP (OUTPATIENT)
Dept: FAMILY MEDICINE | Facility: CLINIC | Age: 75
End: 2025-05-15

## 2025-05-15 DIAGNOSIS — R55 VASOVAGAL SYNCOPE: ICD-10-CM

## 2025-05-15 LAB — LVEF ECHO: NORMAL

## 2025-05-15 PROCEDURE — 93306 TTE W/DOPPLER COMPLETE: CPT

## 2025-05-21 ENCOUNTER — TRANSFERRED RECORDS (OUTPATIENT)
Dept: ADMINISTRATIVE | Facility: CLINIC | Age: 75
End: 2025-05-21
Payer: MEDICARE

## 2025-05-21 NOTE — PROCEDURES
[facility]   [address] [place] [address], [address], [place] [address]     Patient Name: Nia Prieto (Kathy)  Gender:   Female  Exam Date: 05/21/2025 Visit Number:   [id number]  Age: 74 Years 9 Months YOB: 1950  Attending MD: Jayy Nieves MD Medical Record#:   [id number]  -----------------------------------------------------------------------------------------------------------------------------   Procedure: Colonoscopy   Indications: Colorectal cancer screening  Referring MD: Referral Self  Primary MD:      Sammie Pérez MD   Medications: Admitting Medications:   0.9% Normal Saline at [facility]   Intra Procedure Medications:   Patient received monitored anesthesia care.     Complications: No immediate complications  ______________________________________________________________________________  Procedure:   An examination of the heart and lungs was performed and found to be within acceptable limits.  The patient was therefore deemed a reasonable candidate for endoscopy and monitored anesthesia care.   The risks, benefits and plan of the procedure were discussed with the patient and/or patient representative and all questions were answered.  After obtaining informed consent, the patient received monitored anesthesia care and I passed the scope   without difficulty via the rectum to the cecum.  The appendiceal orifice and ic valve were identified.  The scope was retroflexed during the examination  The quality of the prep was good  (Miralax/Gatorade/2 tablets Bisacodyl/Magnesium Citrate).    This was a complete examination throughout the entire colon.    Findings:    Polyp location: cecum.  Quantity: 1.  Size: 6 mm.  Polyp shape:  sessile.         Maneuver: polypectomy was performed with a cold snare.       Removal:  complete.  Retrieval: complete.  Bleeding: none.    Polyp location: ascending colon.  Quantity: 1.  Size: 5 mm.  Polyp shape: sessile.         Maneuver: polypectomy was  performed with a  cold snare.       Removal: complete.  Retrieval: complete.  Bleeding: none.    Diverticulosis.  Location: - ascending colon - transverse colon.        Quantity:  several.    Diverticulosis.  Location: - descending colon - sigmoid.        Quantity:  many.    Anal canal:  internal hemorrhoid(s)    Impression:  Colorectal polyps   Diverticulosis of colon without diverticulitis   Hemorrhoids, internal     Plan  The patient and their physician will receive a copy of the pathology report as well as pathology-based recommendations for future screening or surveillance.   for polyp surveillance.        Electronically signed by:___________________  Jayy Nieves MD                 05/21/2025    Medications:  Medication Dose Sig Description PRN Status PRN Reason Comments   Calcium 500 500 mg calcium (1,250 mg) tablet 500 mg calcium (1,250 mg) take 1 by Oral route  every day N       Allergies:  Medication Name Ingredient Reaction Comment    NO KNOWN ALLERGIES       Vital Signs:  Date Time Systolic Diastolic Height Weight BMI   05/21/2025 1000  86 64 in 165.51 28.40     Race:  White  Ethnicity:  Not  or   Preferred Language:  English      cc: Sammie Pérez MD        [facility] [phone]

## 2025-05-23 PROBLEM — D12.6 ADENOMATOUS POLYP OF COLON: Status: ACTIVE | Noted: 2025-05-23

## 2025-05-27 ENCOUNTER — PATIENT OUTREACH (OUTPATIENT)
Dept: GASTROENTEROLOGY | Facility: CLINIC | Age: 75
End: 2025-05-27
Payer: MEDICARE

## 2025-06-04 ENCOUNTER — INFUSION THERAPY VISIT (OUTPATIENT)
Dept: INFUSION THERAPY | Facility: HOSPITAL | Age: 75
End: 2025-06-04
Attending: INTERNAL MEDICINE
Payer: MEDICARE

## 2025-06-04 VITALS
TEMPERATURE: 97.9 F | HEART RATE: 87 BPM | RESPIRATION RATE: 18 BRPM | SYSTOLIC BLOOD PRESSURE: 150 MMHG | DIASTOLIC BLOOD PRESSURE: 92 MMHG | OXYGEN SATURATION: 96 %

## 2025-06-04 DIAGNOSIS — Z51.81 ENCOUNTER FOR MONITORING AROMATASE INHIBITOR THERAPY: ICD-10-CM

## 2025-06-04 DIAGNOSIS — Z51.81 ENCOUNTER FOR MONITORING AROMATASE INHIBITOR THERAPY: Primary | ICD-10-CM

## 2025-06-04 DIAGNOSIS — Z79.811 ENCOUNTER FOR MONITORING AROMATASE INHIBITOR THERAPY: Primary | ICD-10-CM

## 2025-06-04 DIAGNOSIS — C50.911 INFILTRATING DUCTAL CARCINOMA OF RIGHT BREAST (H): ICD-10-CM

## 2025-06-04 DIAGNOSIS — Z79.811 ENCOUNTER FOR MONITORING AROMATASE INHIBITOR THERAPY: ICD-10-CM

## 2025-06-04 DIAGNOSIS — M81.0 AGE-RELATED OSTEOPOROSIS WITHOUT CURRENT PATHOLOGICAL FRACTURE: ICD-10-CM

## 2025-06-04 DIAGNOSIS — M81.0 AGE-RELATED OSTEOPOROSIS WITHOUT CURRENT PATHOLOGICAL FRACTURE: Primary | ICD-10-CM

## 2025-06-04 LAB
CALCIUM SERPL-MCNC: 10.2 MG/DL (ref 8.8–10.4)
CREAT SERPL-MCNC: 0.8 MG/DL (ref 0.51–0.95)
EGFRCR SERPLBLD CKD-EPI 2021: 77 ML/MIN/1.73M2

## 2025-06-04 PROCEDURE — 250N000011 HC RX IP 250 OP 636: Mod: JZ | Performed by: INTERNAL MEDICINE

## 2025-06-04 PROCEDURE — 36415 COLL VENOUS BLD VENIPUNCTURE: CPT | Performed by: INTERNAL MEDICINE

## 2025-06-04 PROCEDURE — 96374 THER/PROPH/DIAG INJ IV PUSH: CPT

## 2025-06-04 PROCEDURE — 258N000003 HC RX IP 258 OP 636: Performed by: INTERNAL MEDICINE

## 2025-06-04 PROCEDURE — 82310 ASSAY OF CALCIUM: CPT | Performed by: INTERNAL MEDICINE

## 2025-06-04 PROCEDURE — 82565 ASSAY OF CREATININE: CPT | Performed by: INTERNAL MEDICINE

## 2025-06-04 RX ORDER — ALBUTEROL SULFATE 90 UG/1
1-2 INHALANT RESPIRATORY (INHALATION)
Status: CANCELLED
Start: 2026-06-04

## 2025-06-04 RX ORDER — HEPARIN SODIUM,PORCINE 10 UNIT/ML
5-20 VIAL (ML) INTRAVENOUS DAILY PRN
Status: CANCELLED | OUTPATIENT
Start: 2026-06-04

## 2025-06-04 RX ORDER — EPINEPHRINE 1 MG/ML
0.3 INJECTION, SOLUTION INTRAMUSCULAR; SUBCUTANEOUS EVERY 5 MIN PRN
Status: CANCELLED | OUTPATIENT
Start: 2026-06-04

## 2025-06-04 RX ORDER — DIPHENHYDRAMINE HYDROCHLORIDE 50 MG/ML
25 INJECTION, SOLUTION INTRAMUSCULAR; INTRAVENOUS
Status: CANCELLED
Start: 2026-06-04

## 2025-06-04 RX ORDER — DIPHENHYDRAMINE HYDROCHLORIDE 50 MG/ML
50 INJECTION, SOLUTION INTRAMUSCULAR; INTRAVENOUS
Status: CANCELLED
Start: 2026-06-04

## 2025-06-04 RX ORDER — ZOLEDRONIC ACID 0.05 MG/ML
5 INJECTION, SOLUTION INTRAVENOUS ONCE
Status: CANCELLED
Start: 2026-06-04

## 2025-06-04 RX ORDER — ALBUTEROL SULFATE 0.83 MG/ML
2.5 SOLUTION RESPIRATORY (INHALATION)
Status: CANCELLED | OUTPATIENT
Start: 2026-06-04

## 2025-06-04 RX ORDER — MEPERIDINE HYDROCHLORIDE 50 MG/ML
25 INJECTION INTRAMUSCULAR; INTRAVENOUS; SUBCUTANEOUS
Status: CANCELLED | OUTPATIENT
Start: 2026-06-04

## 2025-06-04 RX ORDER — HEPARIN SODIUM (PORCINE) LOCK FLUSH IV SOLN 100 UNIT/ML 100 UNIT/ML
5 SOLUTION INTRAVENOUS
Status: CANCELLED | OUTPATIENT
Start: 2026-06-04

## 2025-06-04 RX ORDER — METHYLPREDNISOLONE SODIUM SUCCINATE 40 MG/ML
40 INJECTION INTRAMUSCULAR; INTRAVENOUS
Status: CANCELLED
Start: 2026-06-04

## 2025-06-04 RX ORDER — ZOLEDRONIC ACID 0.05 MG/ML
5 INJECTION, SOLUTION INTRAVENOUS ONCE
Status: COMPLETED | OUTPATIENT
Start: 2025-06-04 | End: 2025-06-04

## 2025-06-04 RX ADMIN — SODIUM CHLORIDE 250 ML: 0.9 INJECTION, SOLUTION INTRAVENOUS at 12:09

## 2025-06-04 RX ADMIN — ZOLEDRONIC ACID 5 MG: 5 INJECTION INTRAVENOUS at 12:09

## 2025-06-04 NOTE — PROGRESS NOTES
Infusion Nursing Note:  Nia Prieto presents today for Reclast.    Patient seen by provider today: No   present during visit today: Not Applicable.    Note: Nia arrived ambulatory accompanied by her spouse for her first dose of yearly Reclast. Plan of care reviewed and UpToDate patient education for Reclast was reviewed and provided. Patient took Tylenol prior to her appt as advised by Dr. Herrera. She was also encouraged to drink plenty of water today. All questions answered at this time.    Intravenous Access:  Peripheral IV placed and labs drawn by GRABIEL Metcalf.    Treatment Conditions:  Lab Results   Component Value Date     03/19/2025    POTASSIUM 4.8 03/19/2025    MAG 2.2 03/19/2025    CR 0.80 06/04/2025    KRISTY 10.2 06/04/2025    BILITOTAL 0.2 03/19/2025    ALBUMIN 4.4 03/19/2025    ALT 10 03/19/2025    AST 22 03/19/2025       Results reviewed, labs MET treatment parameters, ok to proceed with treatment.    Post Infusion Assessment:  Patient tolerated infusion without incident.  Blood return noted pre and post infusion.  Site patent and intact, free from redness, edema or discomfort.  Access discontinued per protocol.     Discharge Plan:   Patient discharged in stable condition accompanied by: .  Departure Mode: Ambulatory.      Erna Foley RN

## (undated) DEVICE — CLIP LIGACLIP LG YELLOW LT400

## (undated) DEVICE — CUSTOM PACK LAP CHOLE SBA5BLCHEA

## (undated) DEVICE — NEEDLE HYPO MAGELLAN SAFETY 22GA 1 1/2IN 8881850215

## (undated) DEVICE — SU MONOCRYL+ 4-0 18IN PS2 UND MCP496G

## (undated) DEVICE — Device

## (undated) DEVICE — DRAPE SHEET HALF 40X60" 9358

## (undated) DEVICE — PLATE GROUNDING ADULT W/CORD 9165L

## (undated) DEVICE — GLOVE BIOGEL PI ORTHOPRO SZ 7.5 47675

## (undated) DEVICE — TUBING LAP SUCT/IRRIG STRYKER 250070500

## (undated) DEVICE — SU DERMABOND ADVANCED .7ML DNX12

## (undated) DEVICE — SU VICRYL+ 3-0 27IN SH UND VCP416H

## (undated) DEVICE — SUTURE PASSOR W/GUIDE RSG-14F-4-WG

## (undated) DEVICE — WARMER SCOPE DISPOSABLE DLW510

## (undated) DEVICE — PROBE ELECTROSURGICAL TRUNODE GAMMA 120-807605

## (undated) DEVICE — SUCTION MANIFOLD NEPTUNE 2 SYS 1 PORT 702-025-000

## (undated) DEVICE — ESU PENCIL SMOKE EVAC W/ROCKER SWITCH 0703-047-000

## (undated) DEVICE — SUCTION TIP YANKAUER W/O VENT K86

## (undated) DEVICE — SOL RINGERS LACTATED 1000ML BAG 2B2324X

## (undated) DEVICE — ESU ELEC BLADE 2.75" COATED/INSULATED E1455

## (undated) DEVICE — PREP CHLORAPREP 26ML TINTED HI-LITE ORANGE 930815

## (undated) DEVICE — BLADE KNIFE SURG 15 371115

## (undated) DEVICE — ENDO TROCAR SLEEVE KII Z-THREADED 05X100MM CTS02

## (undated) DEVICE — CUSTOM PACK GEN MAJOR SBA5BGMHEA

## (undated) DEVICE — ENDO TROCAR FIRST ENTRY KII FIOS Z-THRD 05X100MM CTF03

## (undated) DEVICE — GLOVE BIOGEL PI ULTRATOUCH G SZ 6.0 42160

## (undated) DEVICE — SOL NACL 0.9% IRRIG 1000ML BOTTLE 2F7124

## (undated) DEVICE — DRAPE TRANSVERSE LAPAROTOMY 120X100X72" 89281

## (undated) DEVICE — ENDO POUCH UNIV RETRIEVAL SYSTEM INZII 10MM CD001

## (undated) DEVICE — SUTURE VICRYL+ 4-0 UNDYED PS-2 VCP496H

## (undated) DEVICE — ENDO SHEARS RENEW LAP ENDOCUT SCISSOR TIP 16.5MM 3142

## (undated) DEVICE — SOL WATER IRRIG 1000ML BOTTLE 2F7114

## (undated) DEVICE — LIGACLIP MEDIUM AESCULAP B2180-1

## (undated) DEVICE — SU SILK 2-0 SH 30" K833H

## (undated) DEVICE — TUBING SMOKE EVAC PNEUMOCLEAR HIGH FLOW 0620050250

## (undated) DEVICE — SUCTION MANIFOLD NEPTUNE 2 SYS 4 PORT 0702-020-000

## (undated) DEVICE — CLIP APPLIER ENDO ROTATING 10MM MED/LG ER320

## (undated) DEVICE — GOWN IMPERVIOUS BREATHABLE SMART LG 89015

## (undated) DEVICE — ENDO TROCAR FIRST ENTRY KII FIOS Z-THRD 11X100MM CTF33

## (undated) RX ORDER — FENTANYL CITRATE 50 UG/ML
INJECTION, SOLUTION INTRAMUSCULAR; INTRAVENOUS
Status: DISPENSED
Start: 2023-07-05

## (undated) RX ORDER — DEXAMETHASONE SODIUM PHOSPHATE 10 MG/ML
INJECTION, SOLUTION INTRAMUSCULAR; INTRAVENOUS
Status: DISPENSED
Start: 2023-06-08

## (undated) RX ORDER — ONDANSETRON 2 MG/ML
INJECTION INTRAMUSCULAR; INTRAVENOUS
Status: DISPENSED
Start: 2023-07-05

## (undated) RX ORDER — PROPOFOL 10 MG/ML
INJECTION, EMULSION INTRAVENOUS
Status: DISPENSED
Start: 2023-07-05

## (undated) RX ORDER — BUPIVACAINE HYDROCHLORIDE 2.5 MG/ML
INJECTION, SOLUTION EPIDURAL; INFILTRATION; INTRACAUDAL
Status: DISPENSED
Start: 2023-06-08

## (undated) RX ORDER — LIDOCAINE HYDROCHLORIDE 10 MG/ML
INJECTION, SOLUTION EPIDURAL; INFILTRATION; INTRACAUDAL; PERINEURAL
Status: DISPENSED
Start: 2023-06-08

## (undated) RX ORDER — FENTANYL CITRATE 50 UG/ML
INJECTION, SOLUTION INTRAMUSCULAR; INTRAVENOUS
Status: DISPENSED
Start: 2023-06-08

## (undated) RX ORDER — DEXAMETHASONE SODIUM PHOSPHATE 10 MG/ML
INJECTION, EMULSION INTRAMUSCULAR; INTRAVENOUS
Status: DISPENSED
Start: 2023-07-05